# Patient Record
Sex: FEMALE | Race: WHITE | NOT HISPANIC OR LATINO | Employment: OTHER | URBAN - NONMETROPOLITAN AREA
[De-identification: names, ages, dates, MRNs, and addresses within clinical notes are randomized per-mention and may not be internally consistent; named-entity substitution may affect disease eponyms.]

---

## 2017-07-11 ENCOUNTER — HOSPITAL ENCOUNTER (EMERGENCY)
Facility: HOSPITAL | Age: 77
Discharge: HOME | End: 2017-07-11
Admitting: EMERGENCY MEDICINE

## 2017-07-11 VITALS
DIASTOLIC BLOOD PRESSURE: 67 MMHG | WEIGHT: 210 LBS | RESPIRATION RATE: 18 BRPM | HEIGHT: 62 IN | BODY MASS INDEX: 38.64 KG/M2 | OXYGEN SATURATION: 95 % | SYSTOLIC BLOOD PRESSURE: 145 MMHG | HEART RATE: 69 BPM | TEMPERATURE: 99 F

## 2017-07-11 DIAGNOSIS — N30.00 ACUTE CYSTITIS WITHOUT HEMATURIA: Primary | ICD-10-CM

## 2017-07-11 LAB
ALBUMIN SERPL-MCNC: 3.9 G/DL (ref 3.4–4.8)
ALBUMIN/GLOB SERPL: 1.1 G/DL (ref 1.1–1.8)
ALP SERPL-CCNC: 172 U/L (ref 38–126)
ALT SERPL W P-5'-P-CCNC: 31 U/L (ref 9–52)
ANION GAP SERPL CALCULATED.3IONS-SCNC: 13 MMOL/L (ref 5–15)
AST SERPL-CCNC: 47 U/L (ref 14–36)
BACTERIA UR QL AUTO: ABNORMAL /HPF
BASOPHILS # BLD AUTO: 0.02 10*3/MM3 (ref 0–0.2)
BASOPHILS NFR BLD AUTO: 0.2 % (ref 0–2)
BILIRUB SERPL-MCNC: 0.4 MG/DL (ref 0.2–1.3)
BILIRUB UR QL STRIP: ABNORMAL
BUN BLD-MCNC: 18 MG/DL (ref 7–21)
BUN/CREAT SERPL: 15.5 (ref 7–25)
CALCIUM SPEC-SCNC: 8.8 MG/DL (ref 8.4–10.2)
CHLORIDE SERPL-SCNC: 99 MMOL/L (ref 95–110)
CK SERPL-CCNC: 41 U/L (ref 30–135)
CLARITY UR: ABNORMAL
CO2 SERPL-SCNC: 27 MMOL/L (ref 22–31)
COLOR UR: YELLOW
CREAT BLD-MCNC: 1.16 MG/DL (ref 0.5–1)
DEPRECATED RDW RBC AUTO: 43.5 FL (ref 36.4–46.3)
EOSINOPHIL # BLD AUTO: 0.08 10*3/MM3 (ref 0–0.7)
EOSINOPHIL NFR BLD AUTO: 0.9 % (ref 0–7)
ERYTHROCYTE [DISTWIDTH] IN BLOOD BY AUTOMATED COUNT: 13.2 % (ref 11.5–14.5)
GFR SERPL CREATININE-BSD FRML MDRD: 45 ML/MIN/1.73 (ref 39–90)
GLOBULIN UR ELPH-MCNC: 3.7 GM/DL (ref 2.3–3.5)
GLUCOSE BLD-MCNC: 255 MG/DL (ref 60–100)
GLUCOSE UR STRIP-MCNC: NEGATIVE MG/DL
HCT VFR BLD AUTO: 38 % (ref 35–45)
HGB BLD-MCNC: 13 G/DL (ref 12–15.5)
HGB UR QL STRIP.AUTO: ABNORMAL
HOLD SPECIMEN: NORMAL
HOLD SPECIMEN: NORMAL
HYALINE CASTS UR QL AUTO: ABNORMAL /LPF
IMM GRANULOCYTES # BLD: 0.02 10*3/MM3 (ref 0–0.02)
IMM GRANULOCYTES NFR BLD: 0.2 % (ref 0–0.5)
KETONES UR QL STRIP: ABNORMAL
LEUKOCYTE ESTERASE UR QL STRIP.AUTO: ABNORMAL
LYMPHOCYTES # BLD AUTO: 1.92 10*3/MM3 (ref 0.6–4.2)
LYMPHOCYTES NFR BLD AUTO: 21.8 % (ref 10–50)
MCH RBC QN AUTO: 31 PG (ref 26.5–34)
MCHC RBC AUTO-ENTMCNC: 34.2 G/DL (ref 31.4–36)
MCV RBC AUTO: 90.5 FL (ref 80–98)
MONOCYTES # BLD AUTO: 0.56 10*3/MM3 (ref 0–0.9)
MONOCYTES NFR BLD AUTO: 6.4 % (ref 0–12)
NEUTROPHILS # BLD AUTO: 6.19 10*3/MM3 (ref 2–8.6)
NEUTROPHILS NFR BLD AUTO: 70.5 % (ref 37–80)
NITRITE UR QL STRIP: NEGATIVE
PH UR STRIP.AUTO: 5.5 [PH] (ref 5–9)
PLATELET # BLD AUTO: 311 10*3/MM3 (ref 150–450)
PMV BLD AUTO: 10.4 FL (ref 8–12)
POTASSIUM BLD-SCNC: 3.7 MMOL/L (ref 3.5–5.1)
PROT SERPL-MCNC: 7.6 G/DL (ref 6.3–8.6)
PROT UR QL STRIP: ABNORMAL
RBC # BLD AUTO: 4.2 10*6/MM3 (ref 3.77–5.16)
RBC # UR: ABNORMAL /HPF
REF LAB TEST METHOD: ABNORMAL
SODIUM BLD-SCNC: 139 MMOL/L (ref 137–145)
SP GR UR STRIP: 1.02 (ref 1–1.03)
SQUAMOUS #/AREA URNS HPF: ABNORMAL /HPF
TROPONIN I SERPL-MCNC: <0.012 NG/ML
UROBILINOGEN UR QL STRIP: ABNORMAL
WBC NRBC COR # BLD: 8.79 10*3/MM3 (ref 3.2–9.8)
WBC UR QL AUTO: ABNORMAL /HPF
WHOLE BLOOD HOLD SPECIMEN: NORMAL
WHOLE BLOOD HOLD SPECIMEN: NORMAL

## 2017-07-11 PROCEDURE — 81001 URINALYSIS AUTO W/SCOPE: CPT

## 2017-07-11 PROCEDURE — 82550 ASSAY OF CK (CPK): CPT

## 2017-07-11 PROCEDURE — 96365 THER/PROPH/DIAG IV INF INIT: CPT

## 2017-07-11 PROCEDURE — 93010 ELECTROCARDIOGRAM REPORT: CPT | Performed by: INTERNAL MEDICINE

## 2017-07-11 PROCEDURE — 87077 CULTURE AEROBIC IDENTIFY: CPT

## 2017-07-11 PROCEDURE — 85025 COMPLETE CBC W/AUTO DIFF WBC: CPT

## 2017-07-11 PROCEDURE — 96366 THER/PROPH/DIAG IV INF ADDON: CPT

## 2017-07-11 PROCEDURE — 99284 EMERGENCY DEPT VISIT MOD MDM: CPT

## 2017-07-11 PROCEDURE — 84484 ASSAY OF TROPONIN QUANT: CPT

## 2017-07-11 PROCEDURE — 80053 COMPREHEN METABOLIC PANEL: CPT

## 2017-07-11 PROCEDURE — 87186 SC STD MICRODIL/AGAR DIL: CPT

## 2017-07-11 PROCEDURE — 25010000002 CEFTRIAXONE: Performed by: PHYSICIAN ASSISTANT

## 2017-07-11 PROCEDURE — 93005 ELECTROCARDIOGRAM TRACING: CPT

## 2017-07-11 PROCEDURE — 87086 URINE CULTURE/COLONY COUNT: CPT

## 2017-07-11 RX ORDER — CLONIDINE HYDROCHLORIDE 0.1 MG/1
0.1 TABLET ORAL ONCE
Status: COMPLETED | OUTPATIENT
Start: 2017-07-11 | End: 2017-07-11

## 2017-07-11 RX ORDER — HYDRALAZINE HYDROCHLORIDE 25 MG/1
25 TABLET, FILM COATED ORAL 2 TIMES DAILY
COMMUNITY
Start: 2017-05-16 | End: 2018-05-17

## 2017-07-11 RX ORDER — PRAVASTATIN SODIUM 20 MG
20 TABLET ORAL 3 TIMES WEEKLY
COMMUNITY
Start: 2017-02-15 | End: 2022-09-20 | Stop reason: HOSPADM

## 2017-07-11 RX ORDER — CLONIDINE HYDROCHLORIDE 0.1 MG/1
0.1 TABLET ORAL ONCE
Status: DISCONTINUED | OUTPATIENT
Start: 2017-07-11 | End: 2017-07-11

## 2017-07-11 RX ORDER — IMATINIB MESYLATE 400 MG/1
400 TABLET, FILM COATED ORAL DAILY
COMMUNITY
End: 2019-07-08

## 2017-07-11 RX ORDER — NITROFURANTOIN 25; 75 MG/1; MG/1
100 CAPSULE ORAL 2 TIMES DAILY
Qty: 14 CAPSULE | Refills: 0 | Status: SHIPPED | OUTPATIENT
Start: 2017-07-11 | End: 2017-07-18

## 2017-07-11 RX ORDER — CLOPIDOGREL BISULFATE 75 MG/1
75 TABLET ORAL DAILY
COMMUNITY
End: 2022-09-20 | Stop reason: HOSPADM

## 2017-07-11 RX ORDER — CARBAMAZEPINE 200 MG/1
200 TABLET ORAL 2 TIMES DAILY
COMMUNITY
Start: 2016-07-25

## 2017-07-11 RX ORDER — LISINOPRIL 40 MG/1
40 TABLET ORAL DAILY
COMMUNITY
Start: 2017-02-15

## 2017-07-11 RX ORDER — METOPROLOL SUCCINATE 100 MG/1
100 TABLET, EXTENDED RELEASE ORAL NIGHTLY
COMMUNITY
Start: 2017-02-15 | End: 2022-09-20 | Stop reason: HOSPADM

## 2017-07-11 RX ORDER — AMLODIPINE BESYLATE 10 MG/1
10 TABLET ORAL NIGHTLY
COMMUNITY
Start: 2017-02-15

## 2017-07-11 RX ORDER — SODIUM CHLORIDE 9 MG/ML
1000 INJECTION, SOLUTION INTRAVENOUS ONCE
Status: COMPLETED | OUTPATIENT
Start: 2017-07-11 | End: 2017-07-11

## 2017-07-11 RX ADMIN — CLONIDINE HYDROCHLORIDE 0.1 MG: 0.1 TABLET ORAL at 16:43

## 2017-07-11 RX ADMIN — SODIUM CHLORIDE 1000 ML: 9 INJECTION, SOLUTION INTRAVENOUS at 16:42

## 2017-07-11 RX ADMIN — CEFTRIAXONE 1 G: 1 INJECTION, POWDER, FOR SOLUTION INTRAMUSCULAR; INTRAVENOUS at 16:42

## 2017-07-11 NOTE — ED PROVIDER NOTES
Subjective   Patient is a 76 y.o. female presenting with back pain.   History provided by:  Patient   used: No    Back Pain   Pain location: CVA tenderness. Bilateral.  Quality:  Aching and cramping  Radiates to:  Does not radiate  Pain severity:  Moderate  Pain is:  Same all the time  Onset quality:  Gradual  Duration:  3 days  Timing:  Constant  Progression:  Worsening  Chronicity:  New  Context: not emotional stress, not falling, not jumping from heights, not lifting heavy objects, not MCA, not MVA, not occupational injury, not pedestrian accident, not physical stress, not recent illness, not recent injury and not twisting    Relieved by:  Nothing  Worsened by:  Nothing  Ineffective treatments:  None tried  Associated symptoms: dysuria, fever and pelvic pain    Associated symptoms: no abdominal pain, no abdominal swelling, no bladder incontinence, no bowel incontinence, no chest pain, no headaches, no leg pain, no numbness, no paresthesias, no perianal numbness, no tingling, no weakness and no weight loss    Risk factors: hx of cancer    Risk factors: no hx of osteoporosis, no lack of exercise, no menopause, not obese, not pregnant, no recent surgery, no steroid use and no vascular disease        Review of Systems   Constitutional: Positive for fever. Negative for activity change, appetite change, chills, diaphoresis, fatigue, unexpected weight change and weight loss.   HENT: Negative.    Eyes: Negative.    Respiratory: Negative.    Cardiovascular: Negative.  Negative for chest pain.   Gastrointestinal: Negative.  Negative for abdominal pain and bowel incontinence.   Endocrine: Negative.    Genitourinary: Positive for dysuria, flank pain, pelvic pain and urgency. Negative for bladder incontinence, decreased urine volume, difficulty urinating, dyspareunia, enuresis, frequency, genital sores, hematuria, menstrual problem, vaginal bleeding, vaginal discharge and vaginal pain.   Musculoskeletal:  Positive for back pain. Negative for arthralgias, gait problem, joint swelling, myalgias, neck pain and neck stiffness.   Skin: Negative.    Allergic/Immunologic: Negative.    Neurological: Negative.  Negative for tingling, weakness, numbness, headaches and paresthesias.   Psychiatric/Behavioral: Negative.        Past Medical History:   Diagnosis Date   • Cancer 2009    leukemia   • Coronary artery disease    • Diabetes mellitus    • Hypertension    • Injury of back    • Myocardial infarction    • Stroke        Allergies   Allergen Reactions   • Atorvastatin      Memory   • Levofloxacin    • Meclizine    • Morphine        Past Surgical History:   Procedure Laterality Date   • CATARACT EXTRACTION     • TRIGEMINAL NERVE DECOMPRESSION     • TUBAL ABDOMINAL LIGATION     • UVULOPALATOPHARYNGOPLASTY         History reviewed. No pertinent family history.    Social History     Social History   • Marital status:      Spouse name: N/A   • Number of children: N/A   • Years of education: N/A     Social History Main Topics   • Smoking status: Never Smoker   • Smokeless tobacco: None   • Alcohol use No   • Drug use: No   • Sexual activity: No     Other Topics Concern   • None     Social History Narrative   • None           Objective   Physical Exam   Constitutional: She is oriented to person, place, and time. She appears well-developed and well-nourished. No distress.   HENT:   Head: Normocephalic and atraumatic.   Eyes: Conjunctivae and EOM are normal. Pupils are equal, round, and reactive to light. Right eye exhibits no discharge. Left eye exhibits no discharge.   Neck: Normal range of motion. Neck supple. No JVD present. No thyromegaly present.   Cardiovascular: Normal rate, regular rhythm, normal heart sounds and intact distal pulses.  Exam reveals no gallop and no friction rub.    No murmur heard.  Pulmonary/Chest: Effort normal and breath sounds normal. No stridor. No respiratory distress. She has no wheezes. She  exhibits no tenderness.   Abdominal: Soft. Bowel sounds are normal. She exhibits no distension and no mass. There is no tenderness. There is no rebound and no guarding. No hernia.   Musculoskeletal: Normal range of motion. She exhibits no edema, tenderness or deformity.   Lymphadenopathy:     She has no cervical adenopathy.   Neurological: She is alert and oriented to person, place, and time. She has normal reflexes. She displays normal reflexes. No cranial nerve deficit. She exhibits normal muscle tone. Coordination normal.   Skin: Skin is warm and dry. No rash noted. She is not diaphoretic. No erythema. No pallor.   Psychiatric: She has a normal mood and affect. Her behavior is normal. Judgment and thought content normal.   Nursing note and vitals reviewed.      Procedures    Labs Reviewed   URINALYSIS W/ CULTURE IF INDICATED - Abnormal; Notable for the following:        Result Value    Appearance, UA Cloudy (*)     Ketones, UA Trace (*)     Bilirubin, UA Small (1+) (*)     Blood, UA Trace (*)     Protein,  mg/dL (2+) (*)     Leuk Esterase, UA Large (3+) (*)     All other components within normal limits   URINALYSIS, MICROSCOPIC ONLY - Abnormal; Notable for the following:     RBC, UA 0-2 (*)     WBC, UA Too Numerous to Count (*)     Bacteria, UA 4+ (*)     Squamous Epithelial Cells, UA 6-12 (*)     All other components within normal limits   COMPREHENSIVE METABOLIC PANEL - Abnormal; Notable for the following:     Glucose 255 (*)     Creatinine 1.16 (*)     AST (SGOT) 47 (*)     Alkaline Phosphatase 172 (*)     Globulin 3.7 (*)     All other components within normal limits    Narrative:     The MDRD GFR formula is only valid for adults with stable renal function between ages 18 and 70.   CK - Normal   TROPONIN (IN-HOUSE) - Normal   CBC WITH AUTO DIFFERENTIAL - Normal   URINE CULTURE   RAINBOW DRAW    Narrative:     The following orders were created for panel order [place] Draw.  Procedure                                Abnormality         Status                     ---------                               -----------         ------                     Light Blue Top[[id number]]                                    Final result               Green Top (Gel)[[id number]]                                  Final result               Lavender Top[[id number]]                                     Final result               Gold Top - SST[[id number]]                                   Final result                 Please view results for these tests on the individual orders.   MYOGLOBIN, SERUM   LIGHT BLUE TOP   GREEN TOP   LAVENDER TOP   GOLD TOP - SST   CBC AND DIFFERENTIAL    Narrative:     The following orders were created for panel order CBC & Differential.  Procedure                               Abnormality         Status                     ---------                               -----------         ------                     CBC Auto Differential[[id number]]        Normal              Final result                 Please view results for these tests on the individual orders.       ED Course  ED Course    Patient is doing better after fluids. States that her back is still hurting some. She has admitted to having urgency and burning for few days. Denies nausea, vomiting, or diarrhea.   Patient states that she has had her blood pressure medication today. Clonidine 0.1mg was given with some change. Patient given a second Clonidine 0.1mg.   Patient being sent home with antibiotics for UTI.   Patient understands and is agreeable with treatment, wants to go home.             MDM  Number of Diagnoses or Management Options     Amount and/or Complexity of Data Reviewed  Clinical lab tests: reviewed  Tests in the medicine section of CPT®: reviewed    Patient Progress  Patient progress: improved      Final diagnoses:   Acute cystitis without hematuria            RICKI Godoy  07/11/17 1720       RICKI Godoy  07/11/17 1728       Navya FARIAS  RICKI Eaton  07/11/17 2008       RICKI Godoy  07/11/17 2009

## 2017-07-13 LAB — BACTERIA SPEC AEROBE CULT: ABNORMAL

## 2019-03-20 ENCOUNTER — HOSPITAL ENCOUNTER (EMERGENCY)
Facility: HOSPITAL | Age: 79
Discharge: HOME | End: 2019-03-20
Attending: EMERGENCY MEDICINE | Admitting: EMERGENCY MEDICINE

## 2019-03-20 ENCOUNTER — APPOINTMENT (OUTPATIENT)
Dept: GENERAL RADIOLOGY | Facility: HOSPITAL | Age: 79
End: 2019-03-20

## 2019-03-20 VITALS
TEMPERATURE: 98.4 F | HEIGHT: 62 IN | OXYGEN SATURATION: 94 % | WEIGHT: 186.7 LBS | SYSTOLIC BLOOD PRESSURE: 177 MMHG | DIASTOLIC BLOOD PRESSURE: 72 MMHG | RESPIRATION RATE: 20 BRPM | HEART RATE: 87 BPM | BODY MASS INDEX: 34.36 KG/M2

## 2019-03-20 DIAGNOSIS — N30.00 ACUTE CYSTITIS WITHOUT HEMATURIA: Primary | ICD-10-CM
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
## 2019-03-20 LAB
ALBUMIN SERPL-MCNC: 3.7 G/DL (ref 3.4–4.8)
ALBUMIN/GLOB SERPL: 1 G/DL (ref 1.1–1.8)
ALP SERPL-CCNC: 157 U/L (ref 38–126)
ALT SERPL W P-5'-P-CCNC: 16 U/L (ref 9–52)
ANION GAP SERPL CALCULATED.3IONS-SCNC: 11 MMOL/L (ref 5–15)
AST SERPL-CCNC: 23 U/L (ref 14–36)
BACTERIA UR QL AUTO: ABNORMAL /HPF
BASOPHILS # BLD AUTO: 0.06 10*3/MM3 (ref 0–0.2)
BASOPHILS NFR BLD AUTO: 0.4 % (ref 0–1.5)
BILIRUB SERPL-MCNC: 0.4 MG/DL (ref 0.2–1.3)
BILIRUB UR QL STRIP: NEGATIVE
BUN BLD-MCNC: 23 MG/DL (ref 7–21)
BUN/CREAT SERPL: 23.7 (ref 7–25)
CALCIUM SPEC-SCNC: 9 MG/DL (ref 8.4–10.2)
CARBAMAZEPINE SERPL-MCNC: 9.3 MCG/ML (ref 4–12)
CHLORIDE SERPL-SCNC: 98 MMOL/L (ref 95–110)
CLARITY UR: ABNORMAL
CO2 SERPL-SCNC: 24 MMOL/L (ref 22–31)
COLOR UR: YELLOW
CREAT BLD-MCNC: 0.97 MG/DL (ref 0.5–1)
DEPRECATED RDW RBC AUTO: 41.1 FL (ref 37–54)
EOSINOPHIL # BLD AUTO: 0.01 10*3/MM3 (ref 0–0.4)
EOSINOPHIL NFR BLD AUTO: 0.1 % (ref 0.3–6.2)
ERYTHROCYTE [DISTWIDTH] IN BLOOD BY AUTOMATED COUNT: 12.9 % (ref 12.3–15.4)
FLUAV AG NPH QL: NEGATIVE
FLUBV AG NPH QL IA: NEGATIVE
GFR SERPL CREATININE-BSD FRML MDRD: 56 ML/MIN/1.73 (ref 39–90)
GLOBULIN UR ELPH-MCNC: 3.6 GM/DL (ref 2.3–3.5)
GLUCOSE BLD-MCNC: 132 MG/DL (ref 60–100)
GLUCOSE UR STRIP-MCNC: ABNORMAL MG/DL
HCT VFR BLD AUTO: 37.8 % (ref 34–46.6)
HGB BLD-MCNC: 12.6 G/DL (ref 12–15.9)
HGB UR QL STRIP.AUTO: ABNORMAL
HOLD SPECIMEN: NORMAL
HYALINE CASTS UR QL AUTO: ABNORMAL /LPF
IMM GRANULOCYTES # BLD AUTO: 0.08 10*3/MM3 (ref 0–0.05)
IMM GRANULOCYTES NFR BLD AUTO: 0.5 % (ref 0–0.5)
KETONES UR QL STRIP: ABNORMAL
LEUKOCYTE ESTERASE UR QL STRIP.AUTO: ABNORMAL
LYMPHOCYTES # BLD AUTO: 1.39 10*3/MM3 (ref 0.7–3.1)
LYMPHOCYTES NFR BLD AUTO: 9.4 % (ref 19.6–45.3)
MCH RBC QN AUTO: 29 PG (ref 26.6–33)
MCHC RBC AUTO-ENTMCNC: 33.3 G/DL (ref 31.5–35.7)
MCV RBC AUTO: 86.9 FL (ref 79–97)
MONOCYTES # BLD AUTO: 1.07 10*3/MM3 (ref 0.1–0.9)
MONOCYTES NFR BLD AUTO: 7.2 % (ref 5–12)
NEUTROPHILS # BLD AUTO: 12.23 10*3/MM3 (ref 1.4–7)
NEUTROPHILS NFR BLD AUTO: 82.4 % (ref 42.7–76)
NITRITE UR QL STRIP: NEGATIVE
NRBC BLD AUTO-RTO: 0 /100 WBC (ref 0–0)
PH UR STRIP.AUTO: 5.5 [PH] (ref 5–9)
PLATELET # BLD AUTO: 245 10*3/MM3 (ref 140–450)
PMV BLD AUTO: 8.8 FL (ref 6–12)
POTASSIUM BLD-SCNC: 3.6 MMOL/L (ref 3.5–5.1)
PROT SERPL-MCNC: 7.3 G/DL (ref 6.3–8.6)
PROT UR QL STRIP: ABNORMAL
RBC # BLD AUTO: 4.35 10*6/MM3 (ref 3.77–5.28)
RBC # UR: ABNORMAL /HPF
REF LAB TEST METHOD: ABNORMAL
SODIUM BLD-SCNC: 133 MMOL/L (ref 137–145)
SP GR UR STRIP: 1.02 (ref 1–1.03)
SQUAMOUS #/AREA URNS HPF: ABNORMAL /HPF
UROBILINOGEN UR QL STRIP: ABNORMAL
WBC NRBC COR # BLD: 14.84 10*3/MM3 (ref 3.4–10.8)
WBC UR QL AUTO: ABNORMAL /HPF
WHOLE BLOOD HOLD SPECIMEN: NORMAL

## 2019-03-20 PROCEDURE — 99285 EMERGENCY DEPT VISIT HI MDM: CPT

## 2019-03-20 PROCEDURE — 87804 INFLUENZA ASSAY W/OPTIC: CPT | Performed by: EMERGENCY MEDICINE

## 2019-03-20 PROCEDURE — 36415 COLL VENOUS BLD VENIPUNCTURE: CPT | Performed by: EMERGENCY MEDICINE

## 2019-03-20 PROCEDURE — 81001 URINALYSIS AUTO W/SCOPE: CPT | Performed by: EMERGENCY MEDICINE

## 2019-03-20 PROCEDURE — 96361 HYDRATE IV INFUSION ADD-ON: CPT

## 2019-03-20 PROCEDURE — 73523 X-RAY EXAM HIPS BI 5/> VIEWS: CPT

## 2019-03-20 PROCEDURE — 96375 TX/PRO/DX INJ NEW DRUG ADDON: CPT

## 2019-03-20 PROCEDURE — 93005 ELECTROCARDIOGRAM TRACING: CPT | Performed by: EMERGENCY MEDICINE

## 2019-03-20 PROCEDURE — 80053 COMPREHEN METABOLIC PANEL: CPT | Performed by: EMERGENCY MEDICINE

## 2019-03-20 PROCEDURE — 73564 X-RAY EXAM KNEE 4 OR MORE: CPT

## 2019-03-20 PROCEDURE — 93010 ELECTROCARDIOGRAM REPORT: CPT | Performed by: INTERNAL MEDICINE

## 2019-03-20 PROCEDURE — 25010000002 FENTANYL CITRATE (PF) 100 MCG/2ML SOLUTION: Performed by: EMERGENCY MEDICINE

## 2019-03-20 PROCEDURE — 73630 X-RAY EXAM OF FOOT: CPT

## 2019-03-20 PROCEDURE — 96376 TX/PRO/DX INJ SAME DRUG ADON: CPT

## 2019-03-20 PROCEDURE — 85025 COMPLETE CBC W/AUTO DIFF WBC: CPT | Performed by: EMERGENCY MEDICINE

## 2019-03-20 PROCEDURE — 80156 ASSAY CARBAMAZEPINE TOTAL: CPT | Performed by: EMERGENCY MEDICINE

## 2019-03-20 PROCEDURE — 96374 THER/PROPH/DIAG INJ IV PUSH: CPT

## 2019-03-20 PROCEDURE — 25010000002 ONDANSETRON PER 1 MG: Performed by: EMERGENCY MEDICINE

## 2019-03-20 RX ORDER — CEPHALEXIN 500 MG/1
500 CAPSULE ORAL 2 TIMES DAILY
Qty: 10 CAPSULE | Refills: 0 | Status: SHIPPED | OUTPATIENT
Start: 2019-03-20 | End: 2019-03-25

## 2019-03-20 RX ORDER — ONDANSETRON 2 MG/ML
4 INJECTION INTRAMUSCULAR; INTRAVENOUS ONCE
Status: COMPLETED | OUTPATIENT
Start: 2019-03-20 | End: 2019-03-20

## 2019-03-20 RX ORDER — FENTANYL CITRATE 50 UG/ML
25 INJECTION, SOLUTION INTRAMUSCULAR; INTRAVENOUS ONCE
Status: COMPLETED | OUTPATIENT
Start: 2019-03-20 | End: 2019-03-20

## 2019-03-20 RX ORDER — SODIUM CHLORIDE 9 MG/ML
INJECTION, SOLUTION INTRAVENOUS
Status: DISCONTINUED
Start: 2019-03-20 | End: 2019-03-21 | Stop reason: HOSPADM

## 2019-03-20 RX ORDER — SODIUM CHLORIDE 0.9 % (FLUSH) 0.9 %
10 SYRINGE (ML) INJECTION AS NEEDED
Status: DISCONTINUED | OUTPATIENT
Start: 2019-03-20 | End: 2019-03-21 | Stop reason: HOSPADM

## 2019-03-20 RX ORDER — CEPHALEXIN 500 MG/1
500 CAPSULE ORAL ONCE
Status: COMPLETED | OUTPATIENT
Start: 2019-03-20 | End: 2019-03-20

## 2019-03-20 RX ORDER — CLONIDINE HYDROCHLORIDE 0.1 MG/1
0.1 TABLET ORAL ONCE
Status: COMPLETED | OUTPATIENT
Start: 2019-03-20 | End: 2019-03-20

## 2019-03-20 RX ADMIN — ONDANSETRON 4 MG: 2 INJECTION INTRAMUSCULAR; INTRAVENOUS at 18:21

## 2019-03-20 RX ADMIN — CEPHALEXIN 500 MG: 500 CAPSULE ORAL at 21:33

## 2019-03-20 RX ADMIN — FENTANYL CITRATE 25 MCG: 50 INJECTION, SOLUTION INTRAMUSCULAR; INTRAVENOUS at 19:45

## 2019-03-20 RX ADMIN — FENTANYL CITRATE 25 MCG: 50 INJECTION, SOLUTION INTRAMUSCULAR; INTRAVENOUS at 18:20

## 2019-03-20 RX ADMIN — CLONIDINE HYDROCHLORIDE 0.1 MG: 0.1 TABLET ORAL at 19:45

## 2019-03-20 RX ADMIN — SODIUM CHLORIDE 500 ML: 9 INJECTION, SOLUTION INTRAVENOUS at 18:20

## 2019-03-20 NOTE — ED PROVIDER NOTES
Subjective   78 year old female presents to the ED after an accidental slip and fall. Denies hitting her head or LOC. Denies focal pain. States she hurts all over. Rates pain 9/10. States her worse pain is her tongue. States a sore there that she is seeing a specialist for and on Tegratol for pain. Been on going off and on for years. States generally feeling unwell x 1 week and fell today around 1 pm. Accompanied by a family member but brought in by EMS. No chest pain or shortness of breath.     Family history, surgical history, social history, current medications and allergies are reviewed with the patient and triage documentation and vitals are reviewed.          History provided by:  Patient and relative   used: No        Review of Systems   Constitutional: Negative for chills and fever.   HENT: Negative for ear discharge, sinus pressure, sinus pain and sore throat.         Tongue pain   Eyes: Negative for photophobia and visual disturbance.   Respiratory: Negative for cough, shortness of breath and wheezing.    Cardiovascular: Negative for chest pain, palpitations and leg swelling.   Gastrointestinal: Negative for abdominal pain, constipation, nausea and vomiting.   Endocrine: Negative.    Genitourinary: Negative for dysuria, frequency and urgency.   Musculoskeletal: Negative for back pain and neck pain.   Skin: Negative for color change, rash and wound.   Allergic/Immunologic: Negative.    Neurological: Positive for weakness. Negative for dizziness, syncope, speech difficulty, light-headedness, numbness and headaches.   Hematological: Negative.    Psychiatric/Behavioral: Negative.        Past Medical History:   Diagnosis Date   • Cancer (CMS/[facility]) 2009    leukemia   • Coronary artery disease    • Diabetes mellitus (CMS/[facility])    • Hypertension    • Injury of back    • Myocardial infarction    • Stroke (CMS/[facility])        Allergies   Allergen Reactions   • Atorvastatin      Memory   • Levofloxacin     • Meclizine    • Morphine        Past Surgical History:   Procedure Laterality Date   • CATARACT EXTRACTION     • TRIGEMINAL NERVE DECOMPRESSION     • TUBAL ABDOMINAL LIGATION     • UVULOPALATOPHARYNGOPLASTY         No family history on file.    Social History     Socioeconomic History   • Marital status:      Spouse name: Not on file   • Number of children: Not on file   • Years of education: Not on file   • Highest education level: Not on file   Tobacco Use   • Smoking status: Never Smoker   Substance and Sexual Activity   • Alcohol use: No   • Drug use: No   • Sexual activity: No           Objective   Physical Exam   Constitutional: She is oriented to person, place, and time. She appears well-developed and well-nourished.   HENT:   Head: Normocephalic and atraumatic.   Mouth/Throat: Oropharynx is clear and moist.   Eyes: EOM are normal. Pupils are equal, round, and reactive to light.   Neck: Normal range of motion.   Cardiovascular: Normal rate, regular rhythm and intact distal pulses.   No murmur heard.  Pulmonary/Chest: Effort normal and breath sounds normal. She has no wheezes.   Abdominal: Soft. Bowel sounds are normal. There is no tenderness.   Musculoskeletal:        Right hip: She exhibits normal range of motion, normal strength, no tenderness, no bony tenderness, no deformity and no laceration.        Left hip: She exhibits normal range of motion, normal strength, no tenderness, no bony tenderness, no crepitus, no deformity and no laceration.        Right knee: She exhibits normal range of motion, no swelling, no effusion, no ecchymosis, no deformity and no bony tenderness. Tenderness found.        Left knee: She exhibits normal range of motion, no swelling, no effusion, no ecchymosis, no deformity and no bony tenderness. Tenderness found.        Cervical back: She exhibits normal range of motion, no tenderness, no bony tenderness, no deformity, no pain and no spasm.   Neurological: She is alert  and oriented to person, place, and time.   Skin: Skin is warm and dry. Capillary refill takes less than 2 seconds.   Nursing note and vitals reviewed.      Procedures  none         ED Course      Labs Reviewed   COMPREHENSIVE METABOLIC PANEL - Abnormal; Notable for the following components:       Result Value    Glucose 132 (*)     BUN 23 (*)     Sodium 133 (*)     Alkaline Phosphatase 157 (*)     Globulin 3.6 (*)     A/G Ratio 1.0 (*)     All other components within normal limits    Narrative:     The MDRD GFR formula is only valid for adults with stable renal function between ages 18 and 70.   URINALYSIS W/ MICROSCOPIC IF INDICATED (NO CULTURE) - Abnormal; Notable for the following components:    Appearance, UA Cloudy (*)     Glucose,  mg/dL (1+) (*)     Ketones, UA 80 mg/dL (3+) (*)     Blood, UA Small (1+) (*)     Protein, UA 30 mg/dL (1+) (*)     Leuk Esterase, UA Moderate (2+) (*)     All other components within normal limits   CBC WITH AUTO DIFFERENTIAL - Abnormal; Notable for the following components:    WBC 14.84 (*)     Neutrophil % 82.4 (*)     Lymphocyte % 9.4 (*)     Eosinophil % 0.1 (*)     Neutrophils, Absolute 12.23 (*)     Monocytes, Absolute 1.07 (*)     Immature Grans, Absolute 0.08 (*)     All other components within normal limits   URINALYSIS, MICROSCOPIC ONLY - Abnormal; Notable for the following components:    RBC, UA 3-5 (*)     WBC, UA Too Numerous to Count (*)     Bacteria, UA 1+ (*)     All other components within normal limits   INFLUENZA ANTIGEN, RAPID - Normal   CARBAMAZEPINE LEVEL, TOTAL - Normal   CBC AND DIFFERENTIAL    Narrative:     The following orders were created for panel order CBC & Differential.  Procedure                               Abnormality         Status                     ---------                               -----------         ------                     CBC Auto Differential[[id number]]        Abnormal            Final result                 Please view  results for these tests on the individual orders.   EXTRA TUBES    Narrative:     The following orders were created for panel order Extra Tubes.  Procedure                               Abnormality         Status                     ---------                               -----------         ------                     Light Blue Top[[id number]]                                   Final result               Green Top (Gel)[[id number]]                                  Final result                 Please view results for these tests on the individual orders.   LIGHT BLUE TOP   GREEN TOP     Xr Knee 4+ View Bilateral    Result Date: 3/20/2019  Narrative: Bilateral four view knees HISTORY: Pain after falling AP, lateral, tunnel and oblique views of each knee obtained. COMPARISON: None No fracture or dislocation. Degenerative changes bilaterally with small osteophytes and narrowing of each medial joint space. Bilateral chondrocalcinosis. Very small bilateral suprapatellar effusions. No other osseous or articular abnormality.     Impression: CONCLUSION: No fracture or dislocation. Degenerative changes bilaterally with small osteophytes and narrowing of each medial joint space. Bilateral chondrocalcinosis. Very small bilateral suprapatellar effusions. [id number] Electronically signed by:  Osbaldo Quiles MD  3/20/2019 5:25 PM CDT Workstation: [id number]    Xr Foot 3+ View Bilateral    Result Date: 3/20/2019  Narrative: Three view bilateral feet HISTORY: Pain after falling AP, lateral and oblique views of each foot obtained. COMPARISON: None No fracture or dislocation. Bilateral hallux valgus deformity and bunion formation, more pronounced on the right. Bilateral calcaneal spurs. No other osseous or articular abnormality.     Impression: CONCLUSION: No fracture or dislocation. Bilateral hallux valgus deformity and bunion formation, more pronounced on the right. Bilateral calcaneal spurs. [id number] Electronically signed by:  Osbaldo Quiles MD   "3/20/2019 5:22 PM CDT Workstation: [id number]    Xr Hips Bilateral With Or Without Pelvis 5 View    Result Date: 3/20/2019  Narrative: Two view bilateral hips with single view pelvis HISTORY: Pain after falling AP and frog-leg lateral views of each hip and AP film of the pelvis obtained. COMPARISON: None No fracture or dislocation. Minimal degenerative change iliac crests and inferior pubic rami. Multilevel degenerative disc disease lumbar spine. No other osseous or articular abnormality.     Impression: CONCLUSION: No fracture or dislocation [id number] Electronically signed by:  Osbaldo Quiles MD  3/20/2019 5:13 PM CDT Workstation: [id number]    EKG March 20, 2019 at 1723 reveals normal sinus rhythm rate of 80 bpm.  There is no ST elevation or depression, no T wave inversion.  T wave flattening in lead III and aVL with no evidence of acute ischemia.  Unchanged from July 11, 2017.    MDM  Number of Diagnoses or Management Options  Accidental fall, initial encounter:   Acute cystitis without hematuria:      Amount and/or Complexity of Data Reviewed  Clinical lab tests: reviewed  Tests in the radiology section of CPT®: reviewed  Tests in the medicine section of CPT®: reviewed    Patient Progress  Patient progress: stable    Imaging with no acute bony abnormality. UA reveals UTI. Remaninig workup unremarkable. No description of syncope. Started on antibiotic. Patient request I find out what is causing her tongue pain. Advised she is being evaluated by a specialist and that is the course she should take. She is already on an appropriate medication that I would prescribe. Then requests admission stating she is too weak to go home. Relative states she cannot get her home in her \"2 door dimitry\". Advised patient and family that EMS would not be covered as she ambulates and lives alone and that there is no criteria for admission. Patient helped out to car by staff and is agreeable to discharge. Advised to return with new or worsening " symptoms.    Final diagnoses:   Acute cystitis without hematuria   Accidental fall, initial encounter            Cooper Kamara DO  03/23/19 1953

## 2019-03-21 NOTE — DISCHARGE INSTRUCTIONS
Please return with new or worsening symptoms.  Get antibiotic filled take as directed for the complete course.  Follow-up with planned specialist regarding your tongue pain.

## 2019-04-13 ENCOUNTER — LAB REQUISITION (OUTPATIENT)
Dept: LAB | Facility: HOSPITAL | Age: 79
End: 2019-04-13

## 2019-04-13 DIAGNOSIS — J11.2 INFLUENZA DUE TO UNIDENTIFIED INFLUENZA VIRUS WITH GASTROINTESTINAL MANIFESTATION: ICD-10-CM

## 2019-04-13 LAB
FLUAV AG NPH QL: NEGATIVE
FLUBV AG NPH QL IA: NEGATIVE

## 2019-04-13 PROCEDURE — 87804 INFLUENZA ASSAY W/OPTIC: CPT | Performed by: GENERAL PRACTICE

## 2019-07-05 ENCOUNTER — CLINICAL SUPPORT (OUTPATIENT)
Dept: AUDIOLOGY | Facility: CLINIC | Age: 79
End: 2019-07-05

## 2019-07-05 DIAGNOSIS — H69.82 EUSTACHIAN TUBE DYSFUNCTION, LEFT: ICD-10-CM

## 2019-07-05 DIAGNOSIS — H91.8X3 OTHER SPECIFIED HEARING LOSS, BILATERAL: Primary | ICD-10-CM

## 2019-07-05 NOTE — PROGRESS NOTES
STANDARD AUDIOMETRIC EVALUATION      Name:  Magy Barton  :  1940  Age:  78 y.o.  Date of Evaluation:  2019      HISTORY    Reason for visit:  Magy Barton is seen today for a hearing test at the request of Dr. Robel Isaacs.  Patient reports both ears were stopped up, and she was having trouble hearing.  She states this seemed to happen overnight, and her TV is louder.  She states she has been on antibiotics, but it hasn't helped.  She states she did not have problems hearing before.       EVALUATION    See Audiogram    RESULTS        Otoscopy and Tympanometry 226 Hz :  Right Ear:  Otoscopy:  Clear ear canal          Tympanometry:  Middle ear function within normal limits    Left Ear:   Otoscopy:  Clear ear canal        Tympanometry:  Reduced pressure and compliance consistent with outer/middle ear involvement    Test technique:  Standard Audiometry     Pure Tone Audiometry:   Patient responded to pure tones at 25-55 dB for 250-8000 Hz in right ear, and at 35-70 dB for 250-8000 Hz in left ear.       Speech Audiometry:        Right Ear:  Speech Reception Threshold (SRT) was obtained at 30 dBHL                 Speech Discrimination scores were 84% in quiet when words were presented at 70 dBHL       Left Ear:  Speech Reception Threshold (SRT) was obtained at 45 dBHL                 Speech Discrimination scores were 96% in quiet when words were presented at 85 dBHL    Reliability:   good    IMPRESSIONS:  1.  Tympanometry results are consistent with Middle ear function within normal limits in right ear, and Reduced pressure and compliance consistent with outer/middle ear involvement in left ear.  2.  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       RECOMMENDATIONS:  Patient is seeing the Ear Nose and Throat physician in the near future.  It was a pleasure seeing Magy Alba  Mick in Audiology today.  We would be happy to do further testing or discuss these test as necessary.          This document has been electronically signed by Kelle Rogers MS CCC-A on July 5, 2019 3:31 PM       Kelle Rogers MS CCC-A  Licensed Audiologist

## 2019-07-08 ENCOUNTER — OFFICE VISIT (OUTPATIENT)
Dept: OTOLARYNGOLOGY | Facility: CLINIC | Age: 79
End: 2019-07-08

## 2019-07-08 VITALS
HEIGHT: 62 IN | WEIGHT: 181 LBS | TEMPERATURE: 98.4 F | BODY MASS INDEX: 33.31 KG/M2 | HEART RATE: 66 BPM | OXYGEN SATURATION: 98 %

## 2019-07-08 DIAGNOSIS — H65.22 CHRONIC SEROUS OTITIS MEDIA, LEFT EAR: ICD-10-CM

## 2019-07-08 DIAGNOSIS — H90.72 MIXED CONDUCTIVE AND SENSORINEURAL HEARING LOSS OF LEFT EAR WITH UNRESTRICTED HEARING OF RIGHT EAR: Primary | ICD-10-CM

## 2019-07-08 PROCEDURE — 99203 OFFICE O/P NEW LOW 30 MIN: CPT | Performed by: OTOLARYNGOLOGY

## 2019-07-08 RX ORDER — FLUTICASONE PROPIONATE 50 MCG
2 SPRAY, SUSPENSION (ML) NASAL 2 TIMES DAILY
Qty: 16 G | Refills: 5 | Status: SHIPPED | OUTPATIENT
Start: 2019-07-08 | End: 2019-08-08 | Stop reason: SDUPTHER

## 2019-07-08 NOTE — PROGRESS NOTES
Subjective   Magy Barton is a 78 y.o. female.   Ear problems  History of Present Illness   She has had worsening hearing loss last few weeks worse in the left and right she denies any pain drainage of the ear she is been placed on antibiotics but still having problems.  She has a known history of diabetes but her son said she did not really have problems with her hearing otherwise until recently  She has had a history of stroke is unclear but she may have been on prednisone though not currently    The following portions of the patient's history were reviewed and updated as appropriate: allergies, current medications, past family history, past medical history, past social history, past surgical history and problem list.      Magy Barton reports that she has never smoked. She has never used smokeless tobacco. She reports that she does not drink alcohol or use drugs.  Patient is not a tobacco user and has not been counseled for use of tobacco products    History reviewed. No pertinent family history.      Current Outpatient Medications:   •  amLODIPine (NORVASC) 10 MG tablet, Take 10 mg by mouth Every Night., Disp: , Rfl:   •  Aspirin (ASPIR-81 PO), Take 81 mg by mouth Daily., Disp: , Rfl:   •  carBAMazepine (TEGretol) 200 MG tablet, Take 200 mg by mouth 2 (Two) Times a Day., Disp: , Rfl:   •  clopidogrel (PLAVIX) 75 MG tablet, Take 75 mg by mouth Daily., Disp: , Rfl:   •  Insulin Glargine (LANTUS SOLOSTAR) 100 UNIT/ML injection pen, Inject 28 Units under the skin Every Night., Disp: , Rfl:   •  lisinopril (PRINIVIL,ZESTRIL) 40 MG tablet, Take 40 mg by mouth Daily., Disp: , Rfl:   •  metoprolol succinate XL (TOPROL-XL) 100 MG 24 hr tablet, Take 100 mg by mouth Every Night., Disp: , Rfl:   •  pravastatin (PRAVACHOL) 20 MG tablet, Take 20 mg by mouth 3 (Three) Times a Week., Disp: , Rfl:   •  fluticasone (FLONASE) 50 MCG/ACT nasal spray, 2 sprays into the nostril(s) as directed by provider 2 (Two) Times a Day.,  Disp: 16 g, Rfl: 5    Allergies   Allergen Reactions   • Atorvastatin      Memory   • Levofloxacin    • Meclizine    • Morphine        Past Medical History:   Diagnosis Date   • Cancer (CMS/HCC) 2009    leukemia   • Coronary artery disease    • Diabetes mellitus (CMS/HCC)    • Hypertension    • Injury of back    • Myocardial infarction (CMS/HCC)    • Stroke (CMS/HCC)        Past Surgical History:   Procedure Laterality Date   • CATARACT EXTRACTION     • TRIGEMINAL NERVE DECOMPRESSION     • TUBAL ABDOMINAL LIGATION     • UVULOPALATOPHARYNGOPLASTY         Review of Systems   Constitutional: Negative for fever.   HENT: Positive for hearing loss. Negative for ear discharge and ear pain.    Neurological: Negative for dizziness.   All other systems reviewed and are negative.          Objective   Physical Exam   Constitutional: She appears well-developed.   HENT:   Head: Normocephalic.   Right Ear: External ear and ear canal normal.   Left Ear: External ear and ear canal normal. No mastoid tenderness. Tympanic membrane is scarred and retracted. A middle ear effusion is present.   Mouth/Throat: Uvula is midline and oropharynx is clear and moist.   Eyes: Conjunctivae are normal.   Neck: Normal range of motion.   Pulmonary/Chest: Effort normal.   Musculoskeletal: Normal range of motion.   Skin: Skin is warm.   Psychiatric: She has a normal mood and affect.     Audiogram was reviewed with the patient son showing mixed hearing loss in the left and bilateral sensorineural hearing loss tympanograms suggest fluid in the left ear        Assessment/Plan   Magy was seen today for ear problem.    Diagnoses and all orders for this visit:    Mixed conductive and sensorineural hearing loss of left ear with unrestricted hearing of right ear    Chronic serous otitis media, left ear    Other orders  -     fluticasone (FLONASE) 50 MCG/ACT nasal spray; 2 sprays into the nostril(s) as directed by provider 2 (Two) Times a Day.      Discussed  treatment options which are somewhat limited she may eventually need a tube placed placed on intranasal steroid spray twice a day.  We are avoiding decongestants because of her other medical problems and avoiding steroids because of her diabetes    I talked about the difficulties with she and her son about treatment options they want to try to avoid tube placement we will recheck in 3 weeks and reassess with audiogram there is no active evidence of infection so need for antibiotics is not necessary at this point

## 2019-07-08 NOTE — PATIENT INSTRUCTIONS

## 2019-08-08 ENCOUNTER — CLINICAL SUPPORT (OUTPATIENT)
Dept: AUDIOLOGY | Facility: CLINIC | Age: 79
End: 2019-08-08

## 2019-08-08 ENCOUNTER — OFFICE VISIT (OUTPATIENT)
Dept: OTOLARYNGOLOGY | Facility: CLINIC | Age: 79
End: 2019-08-08

## 2019-08-08 VITALS — WEIGHT: 184.2 LBS | HEIGHT: 62 IN | TEMPERATURE: 97.8 F | BODY MASS INDEX: 33.9 KG/M2

## 2019-08-08 DIAGNOSIS — H68.012 EUSTACHIAN SALPINGITIS, ACUTE, LEFT: ICD-10-CM

## 2019-08-08 DIAGNOSIS — H65.22 CHRONIC SEROUS OTITIS MEDIA, LEFT EAR: ICD-10-CM

## 2019-08-08 DIAGNOSIS — H69.82 EUSTACHIAN TUBE DYSFUNCTION, LEFT: ICD-10-CM

## 2019-08-08 DIAGNOSIS — H91.8X3 OTHER SPECIFIED HEARING LOSS, BILATERAL: Primary | ICD-10-CM

## 2019-08-08 DIAGNOSIS — H90.72 MIXED CONDUCTIVE AND SENSORINEURAL HEARING LOSS OF LEFT EAR WITH UNRESTRICTED HEARING OF RIGHT EAR: Primary | ICD-10-CM

## 2019-08-08 PROCEDURE — 99213 OFFICE O/P EST LOW 20 MIN: CPT | Performed by: OTOLARYNGOLOGY

## 2019-08-08 PROCEDURE — 92511 NASOPHARYNGOSCOPY: CPT | Performed by: OTOLARYNGOLOGY

## 2019-08-08 RX ORDER — FLUTICASONE PROPIONATE 50 MCG
2 SPRAY, SUSPENSION (ML) NASAL 2 TIMES DAILY
Qty: 16 G | Refills: 5 | Status: SHIPPED | OUTPATIENT
Start: 2019-08-08 | End: 2022-09-20 | Stop reason: HOSPADM

## 2019-08-08 NOTE — PATIENT INSTRUCTIONS

## 2019-08-08 NOTE — PROGRESS NOTES
Subjective   Magy Barton is a 78 y.o. female.     Follow-up ear problem  History of Present Illness   She states she took her medications regularly  Denies pain or ear drainage or vertigo still has hearing loss    The following portions of the patient's history were reviewed and updated as appropriate: allergies, current medications, past family history, past medical history, past social history, past surgical history and problem list.      Current Outpatient Medications:   •  amLODIPine (NORVASC) 10 MG tablet, Take 10 mg by mouth Every Night., Disp: , Rfl:   •  Aspirin (ASPIR-81 PO), Take 81 mg by mouth Daily., Disp: , Rfl:   •  carBAMazepine (TEGretol) 200 MG tablet, Take 200 mg by mouth 2 (Two) Times a Day., Disp: , Rfl:   •  clopidogrel (PLAVIX) 75 MG tablet, Take 75 mg by mouth Daily., Disp: , Rfl:   •  fluticasone (FLONASE) 50 MCG/ACT nasal spray, 2 sprays into the nostril(s) as directed by provider 2 (Two) Times a Day., Disp: 16 g, Rfl: 5  •  Insulin Glargine (LANTUS SOLOSTAR) 100 UNIT/ML injection pen, Inject 28 Units under the skin Every Night., Disp: , Rfl:   •  lisinopril (PRINIVIL,ZESTRIL) 40 MG tablet, Take 40 mg by mouth Daily., Disp: , Rfl:   •  metoprolol succinate XL (TOPROL-XL) 100 MG 24 hr tablet, Take 100 mg by mouth Every Night., Disp: , Rfl:   •  pravastatin (PRAVACHOL) 20 MG tablet, Take 20 mg by mouth 3 (Three) Times a Week., Disp: , Rfl:     Allergies   Allergen Reactions   • Atorvastatin      Memory   • Levofloxacin    • Meclizine    • Morphine              Review of Systems   Constitutional: Negative for fever.   HENT: Positive for hearing loss. Negative for ear discharge and sinus pain.    Neurological: Negative for dizziness.           Objective   Physical Exam   Constitutional: She appears well-developed.   HENT:   Head: Normocephalic.   Right Ear: External ear and ear canal normal.   Left Ear: External ear and ear canal normal. No mastoid tenderness. Tympanic membrane is scarred and  retracted. A middle ear effusion is present.   Nose: Septal deviation present. No mucosal edema.   Mouth/Throat: Uvula is midline, oropharynx is clear and moist and mucous membranes are normal.   Eyes: Conjunctivae are normal.   Neck: Normal range of motion.   Pulmonary/Chest: Effort normal.   Musculoskeletal: Normal range of motion.   Skin: Skin is warm.   Psychiatric: She has a normal mood and affect.     Audiogram was done and reviewed with the patient showing mixed hearing loss and fluid in the left ear actual tracings were shown to her    Procedure note: Nasopharyngoscopy was done without complication patient tolerated well after use of topical lidocaine and Afrin nasal spray there were no comp bleeding in the she has minimal adenoid tissue no obvious mass or lesion of the nasopharynx no bleeding is noted she does have a deviated septum  Assessment/Plan   Magy was seen today for follow-up.    Diagnoses and all orders for this visit:    Mixed conductive and sensorineural hearing loss of left ear with unrestricted hearing of right ear    Chronic serous otitis media, left ear    Eustachian salpingitis, acute, left    Other orders  -     fluticasone (FLONASE) 50 MCG/ACT nasal spray; 2 sprays into the nostril(s) as directed by provider 2 (Two) Times a Day.    Notes given the patient's family because they did not come with her    She went to take a conservative program which continue the medicine longer told her it is unlikely work   a tube should be considered but will hold off  We discussed the risk benefits and myringotomy versus further indications she wants to take medications told her it is unlikely to solve the problem but she wants to do that for 3 more weeks    Since her nasopharyngoscopy is does not show any tumor mass we will see her back in 3 weeks and I suggested consideration of myringotomy and PE tube she wants to do it under anesthesia not in the office

## 2019-08-08 NOTE — PROGRESS NOTES
STANDARD AUDIOMETRIC EVALUATION      Name:  Magy Barton  :  1940  Age:  78 y.o.  Date of Evaluation:  2019      HISTORY    Reason for visit:  Magy Barton is seen today for a hearing test at the request of Dr. Robel Isaacs.  Patient reports her ears feel better, and her hearing seems better, but her ears still feel a little stopped up.      EVALUATION    See Audiogram    RESULTS        Otoscopy and Tympanometry 226 Hz :  Right Ear:  Otoscopy:  Clear ear canal          Tympanometry:  Middle ear function within normal limits    Left Ear:   Otoscopy:  Clear ear canal        Tympanometry:  Reduced pressure and compliance consistent with outer/middle ear involvement    Test technique:  Standard Audiometry     Pure Tone Audiometry:   Patient responded to pure tones at 20-65 dB for 250-8000 Hz in right ear, and at 30-80 dB for 250-8000 Hz in left ear.       Speech Audiometry:        Right Ear:  Speech Reception Threshold (SRT) was obtained at 25 dBHL                 Speech Discrimination scores were 96% in quiet when words were presented at 65 dBHL       Left Ear:  Speech Reception Threshold (SRT) was obtained at 45 dBHL                 Speech Discrimination scores were 96% in masking noise when words were presented at  85 dBHL    Reliability:   good    IMPRESSIONS:  1.  Tympanometry results are consistent with Middle ear function within normal limits in right ear, and Reduced pressure and compliance consistent with outer/middle ear involvement in left ear.  2.  Pure tone results are consistent with mild to moderate sloping sensorineural hearing loss  for right ear, and mild to severe sloping mixed hearing loss  in left ear.       RECOMMENDATIONS:  Patient is seeing the Ear Nose and Throat physician immediately following this examination.  It was a pleasure seeing Magy Barton in Audiology today.  We would be happy to do further testing or discuss these test as necessary.          This document has been  electronically signed by Kelle Rogers MS CCC-A on August 8, 2019 4:40 PM       Kelle Rogers MS CCC-A  Licensed Audiologist

## 2021-01-20 ENCOUNTER — IMMUNIZATION (OUTPATIENT)
Dept: VACCINE CLINIC | Facility: HOSPITAL | Age: 81
End: 2021-01-20

## 2021-01-20 PROCEDURE — 0001A: CPT | Performed by: THORACIC SURGERY (CARDIOTHORACIC VASCULAR SURGERY)

## 2021-01-20 PROCEDURE — 91300 HC SARSCOV02 VAC 30MCG/0.3ML IM: CPT | Performed by: THORACIC SURGERY (CARDIOTHORACIC VASCULAR SURGERY)

## 2021-02-10 ENCOUNTER — IMMUNIZATION (OUTPATIENT)
Dept: VACCINE CLINIC | Facility: HOSPITAL | Age: 81
End: 2021-02-10

## 2021-02-10 PROCEDURE — 0002A: CPT | Performed by: THORACIC SURGERY (CARDIOTHORACIC VASCULAR SURGERY)

## 2021-02-10 PROCEDURE — 91300 HC SARSCOV02 VAC 30MCG/0.3ML IM: CPT | Performed by: THORACIC SURGERY (CARDIOTHORACIC VASCULAR SURGERY)

## 2021-04-23 ENCOUNTER — APPOINTMENT (OUTPATIENT)
Dept: CT IMAGING | Facility: HOSPITAL | Age: 81
End: 2021-04-23

## 2021-04-23 ENCOUNTER — HOSPITAL ENCOUNTER (INPATIENT)
Facility: HOSPITAL | Age: 81
LOS: 5 days | Discharge: NURSING, REHAB OR LONG-TERM CARE | End: 2021-04-29
Attending: FAMILY MEDICINE | Admitting: INTERNAL MEDICINE

## 2021-04-23 ENCOUNTER — APPOINTMENT (OUTPATIENT)
Dept: GENERAL RADIOLOGY | Facility: HOSPITAL | Age: 81
End: 2021-04-23

## 2021-04-23 DIAGNOSIS — R73.9 HYPERGLYCEMIA: ICD-10-CM

## 2021-04-23 DIAGNOSIS — R31.9 URINARY TRACT INFECTION WITH HEMATURIA, SITE UNSPECIFIED: Primary | ICD-10-CM

## 2021-04-23 DIAGNOSIS — Z78.9 IMPAIRED MOBILITY AND ACTIVITIES OF DAILY LIVING: ICD-10-CM

## 2021-04-23 DIAGNOSIS — N39.0 URINARY TRACT INFECTION WITH HEMATURIA, SITE UNSPECIFIED: Primary | ICD-10-CM

## 2021-04-23 DIAGNOSIS — E86.0 DEHYDRATION: ICD-10-CM

## 2021-04-23 DIAGNOSIS — Z74.09 IMPAIRED MOBILITY AND ACTIVITIES OF DAILY LIVING: ICD-10-CM

## 2021-04-23 DIAGNOSIS — Z74.09 IMPAIRED FUNCTIONAL MOBILITY, BALANCE, GAIT, AND ENDURANCE: ICD-10-CM

## 2021-04-23 DIAGNOSIS — R53.1 WEAKNESS GENERALIZED: ICD-10-CM

## 2021-04-23 LAB
ALBUMIN SERPL-MCNC: 3.3 G/DL (ref 3.5–5.2)
ALBUMIN/GLOB SERPL: 0.9 G/DL
ALP SERPL-CCNC: 182 U/L (ref 39–117)
ALT SERPL W P-5'-P-CCNC: 12 U/L (ref 1–33)
ANION GAP SERPL CALCULATED.3IONS-SCNC: 11 MMOL/L (ref 5–15)
AST SERPL-CCNC: 19 U/L (ref 1–32)
BACTERIA UR QL AUTO: ABNORMAL /HPF
BASOPHILS # BLD AUTO: 0.05 10*3/MM3 (ref 0–0.2)
BASOPHILS NFR BLD AUTO: 0.4 % (ref 0–1.5)
BILIRUB SERPL-MCNC: 0.4 MG/DL (ref 0–1.2)
BILIRUB UR QL STRIP: ABNORMAL
BUN SERPL-MCNC: 16 MG/DL (ref 8–23)
BUN/CREAT SERPL: 13.2 (ref 7–25)
CALCIUM SPEC-SCNC: 8.7 MG/DL (ref 8.6–10.5)
CHLORIDE SERPL-SCNC: 96 MMOL/L (ref 98–107)
CK SERPL-CCNC: 70 U/L (ref 20–180)
CLARITY UR: ABNORMAL
CO2 SERPL-SCNC: 25 MMOL/L (ref 22–29)
COLOR UR: YELLOW
CREAT SERPL-MCNC: 1.21 MG/DL (ref 0.57–1)
DEPRECATED RDW RBC AUTO: 42.6 FL (ref 37–54)
EOSINOPHIL # BLD AUTO: 0.01 10*3/MM3 (ref 0–0.4)
EOSINOPHIL NFR BLD AUTO: 0.1 % (ref 0.3–6.2)
ERYTHROCYTE [DISTWIDTH] IN BLOOD BY AUTOMATED COUNT: 12.9 % (ref 12.3–15.4)
FLUAV RNA RESP QL NAA+PROBE: NOT DETECTED
FLUBV RNA RESP QL NAA+PROBE: NOT DETECTED
GFR SERPL CREATININE-BSD FRML MDRD: 43 ML/MIN/1.73
GLOBULIN UR ELPH-MCNC: 3.6 GM/DL
GLUCOSE SERPL-MCNC: 311 MG/DL (ref 65–99)
GLUCOSE UR STRIP-MCNC: ABNORMAL MG/DL
GRAN CASTS URNS QL MICRO: ABNORMAL /LPF
HCT VFR BLD AUTO: 38.8 % (ref 34–46.6)
HGB BLD-MCNC: 13.1 G/DL (ref 12–15.9)
HGB UR QL STRIP.AUTO: ABNORMAL
HOLD SPECIMEN: NORMAL
HYALINE CASTS UR QL AUTO: ABNORMAL /LPF
IMM GRANULOCYTES # BLD AUTO: 0.11 10*3/MM3 (ref 0–0.05)
IMM GRANULOCYTES NFR BLD AUTO: 0.9 % (ref 0–0.5)
KETONES UR QL STRIP: ABNORMAL
LEUKOCYTE ESTERASE UR QL STRIP.AUTO: ABNORMAL
LIPASE SERPL-CCNC: 21 U/L (ref 13–60)
LYMPHOCYTES # BLD AUTO: 0.85 10*3/MM3 (ref 0.7–3.1)
LYMPHOCYTES NFR BLD AUTO: 7.3 % (ref 19.6–45.3)
MAGNESIUM SERPL-MCNC: 1.8 MG/DL (ref 1.6–2.4)
MCH RBC QN AUTO: 30.8 PG (ref 26.6–33)
MCHC RBC AUTO-ENTMCNC: 33.8 G/DL (ref 31.5–35.7)
MCV RBC AUTO: 91.1 FL (ref 79–97)
MONOCYTES # BLD AUTO: 1.01 10*3/MM3 (ref 0.1–0.9)
MONOCYTES NFR BLD AUTO: 8.7 % (ref 5–12)
NEUTROPHILS NFR BLD AUTO: 82.6 % (ref 42.7–76)
NEUTROPHILS NFR BLD AUTO: 9.62 10*3/MM3 (ref 1.7–7)
NITRITE UR QL STRIP: NEGATIVE
NRBC BLD AUTO-RTO: 0 /100 WBC (ref 0–0.2)
PH UR STRIP.AUTO: 6 [PH] (ref 5–9)
PLATELET # BLD AUTO: 223 10*3/MM3 (ref 140–450)
PMV BLD AUTO: 9.4 FL (ref 6–12)
POTASSIUM SERPL-SCNC: 3.6 MMOL/L (ref 3.5–5.2)
PROT SERPL-MCNC: 6.9 G/DL (ref 6–8.5)
PROT UR QL STRIP: ABNORMAL
RBC # BLD AUTO: 4.26 10*6/MM3 (ref 3.77–5.28)
RBC # UR: ABNORMAL /HPF
REF LAB TEST METHOD: ABNORMAL
SARS-COV-2 RNA RESP QL NAA+PROBE: NOT DETECTED
SODIUM SERPL-SCNC: 132 MMOL/L (ref 136–145)
SP GR UR STRIP: 1.02 (ref 1–1.03)
SQUAMOUS #/AREA URNS HPF: ABNORMAL /HPF
UROBILINOGEN UR QL STRIP: ABNORMAL
WBC # BLD AUTO: 11.65 10*3/MM3 (ref 3.4–10.8)
WBC UR QL AUTO: ABNORMAL /HPF
WHOLE BLOOD HOLD SPECIMEN: NORMAL

## 2021-04-23 PROCEDURE — G0378 HOSPITAL OBSERVATION PER HR: HCPCS

## 2021-04-23 PROCEDURE — 87186 SC STD MICRODIL/AGAR DIL: CPT | Performed by: FAMILY MEDICINE

## 2021-04-23 PROCEDURE — 70450 CT HEAD/BRAIN W/O DYE: CPT

## 2021-04-23 PROCEDURE — 25010000002 CEFTRIAXONE PER 250 MG: Performed by: FAMILY MEDICINE

## 2021-04-23 PROCEDURE — 74022 RADEX COMPL AQT ABD SERIES: CPT

## 2021-04-23 PROCEDURE — 87636 SARSCOV2 & INF A&B AMP PRB: CPT | Performed by: FAMILY MEDICINE

## 2021-04-23 PROCEDURE — 93005 ELECTROCARDIOGRAM TRACING: CPT | Performed by: FAMILY MEDICINE

## 2021-04-23 PROCEDURE — 83735 ASSAY OF MAGNESIUM: CPT | Performed by: FAMILY MEDICINE

## 2021-04-23 PROCEDURE — 87040 BLOOD CULTURE FOR BACTERIA: CPT | Performed by: FAMILY MEDICINE

## 2021-04-23 PROCEDURE — 87086 URINE CULTURE/COLONY COUNT: CPT | Performed by: FAMILY MEDICINE

## 2021-04-23 PROCEDURE — 83690 ASSAY OF LIPASE: CPT | Performed by: FAMILY MEDICINE

## 2021-04-23 PROCEDURE — 82550 ASSAY OF CK (CPK): CPT | Performed by: FAMILY MEDICINE

## 2021-04-23 PROCEDURE — 99285 EMERGENCY DEPT VISIT HI MDM: CPT

## 2021-04-23 PROCEDURE — 85025 COMPLETE CBC W/AUTO DIFF WBC: CPT | Performed by: FAMILY MEDICINE

## 2021-04-23 PROCEDURE — 81001 URINALYSIS AUTO W/SCOPE: CPT | Performed by: FAMILY MEDICINE

## 2021-04-23 PROCEDURE — 87077 CULTURE AEROBIC IDENTIFY: CPT | Performed by: FAMILY MEDICINE

## 2021-04-23 PROCEDURE — 80053 COMPREHEN METABOLIC PANEL: CPT | Performed by: FAMILY MEDICINE

## 2021-04-23 RX ORDER — SODIUM CHLORIDE 450 MG/100ML
50 INJECTION, SOLUTION INTRAVENOUS CONTINUOUS
Status: DISCONTINUED | OUTPATIENT
Start: 2021-04-24 | End: 2021-04-29 | Stop reason: HOSPADM

## 2021-04-23 RX ORDER — CLOPIDOGREL BISULFATE 75 MG/1
75 TABLET ORAL DAILY
Status: DISCONTINUED | OUTPATIENT
Start: 2021-04-24 | End: 2021-04-29 | Stop reason: HOSPADM

## 2021-04-23 RX ORDER — NICOTINE POLACRILEX 4 MG
15 LOZENGE BUCCAL
Status: DISCONTINUED | OUTPATIENT
Start: 2021-04-23 | End: 2021-04-29 | Stop reason: HOSPADM

## 2021-04-23 RX ORDER — ASPIRIN 81 MG/1
81 TABLET ORAL DAILY
Status: DISCONTINUED | OUTPATIENT
Start: 2021-04-24 | End: 2021-04-29 | Stop reason: HOSPADM

## 2021-04-23 RX ORDER — ACETAMINOPHEN 325 MG/1
650 TABLET ORAL EVERY 4 HOURS PRN
Status: DISCONTINUED | OUTPATIENT
Start: 2021-04-23 | End: 2021-04-29 | Stop reason: HOSPADM

## 2021-04-23 RX ORDER — FAMOTIDINE 40 MG/1
40 TABLET, FILM COATED ORAL DAILY
Status: DISCONTINUED | OUTPATIENT
Start: 2021-04-24 | End: 2021-04-29 | Stop reason: HOSPADM

## 2021-04-23 RX ORDER — PRAVASTATIN SODIUM 20 MG
20 TABLET ORAL 3 TIMES WEEKLY
Status: DISCONTINUED | OUTPATIENT
Start: 2021-04-24 | End: 2021-04-29 | Stop reason: HOSPADM

## 2021-04-23 RX ORDER — FLUTICASONE PROPIONATE 50 MCG
2 SPRAY, SUSPENSION (ML) NASAL 2 TIMES DAILY
Status: DISCONTINUED | OUTPATIENT
Start: 2021-04-24 | End: 2021-04-24

## 2021-04-23 RX ORDER — ONDANSETRON 2 MG/ML
4 INJECTION INTRAMUSCULAR; INTRAVENOUS EVERY 6 HOURS PRN
Status: DISCONTINUED | OUTPATIENT
Start: 2021-04-23 | End: 2021-04-29 | Stop reason: HOSPADM

## 2021-04-23 RX ORDER — METOPROLOL SUCCINATE 50 MG/1
100 TABLET, EXTENDED RELEASE ORAL NIGHTLY
Status: DISCONTINUED | OUTPATIENT
Start: 2021-04-24 | End: 2021-04-29 | Stop reason: HOSPADM

## 2021-04-23 RX ORDER — SODIUM CHLORIDE 0.9 % (FLUSH) 0.9 %
10 SYRINGE (ML) INJECTION AS NEEDED
Status: DISCONTINUED | OUTPATIENT
Start: 2021-04-23 | End: 2021-04-29 | Stop reason: HOSPADM

## 2021-04-23 RX ORDER — SODIUM CHLORIDE 9 MG/ML
125 INJECTION, SOLUTION INTRAVENOUS CONTINUOUS
Status: DISCONTINUED | OUTPATIENT
Start: 2021-04-23 | End: 2021-04-24

## 2021-04-23 RX ORDER — ACETAMINOPHEN 650 MG/1
650 SUPPOSITORY RECTAL EVERY 4 HOURS PRN
Status: DISCONTINUED | OUTPATIENT
Start: 2021-04-23 | End: 2021-04-29 | Stop reason: HOSPADM

## 2021-04-23 RX ORDER — SODIUM CHLORIDE 0.9 % (FLUSH) 0.9 %
10 SYRINGE (ML) INJECTION EVERY 12 HOURS SCHEDULED
Status: DISCONTINUED | OUTPATIENT
Start: 2021-04-24 | End: 2021-04-29 | Stop reason: HOSPADM

## 2021-04-23 RX ORDER — ACETAMINOPHEN 160 MG/5ML
650 SOLUTION ORAL EVERY 4 HOURS PRN
Status: DISCONTINUED | OUTPATIENT
Start: 2021-04-23 | End: 2021-04-24 | Stop reason: SDUPTHER

## 2021-04-23 RX ORDER — LISINOPRIL 40 MG/1
40 TABLET ORAL DAILY
Status: DISCONTINUED | OUTPATIENT
Start: 2021-04-24 | End: 2021-04-29 | Stop reason: HOSPADM

## 2021-04-23 RX ORDER — AMLODIPINE BESYLATE 10 MG/1
10 TABLET ORAL NIGHTLY
Status: DISCONTINUED | OUTPATIENT
Start: 2021-04-24 | End: 2021-04-29 | Stop reason: HOSPADM

## 2021-04-23 RX ORDER — DEXTROSE MONOHYDRATE 25 G/50ML
25 INJECTION, SOLUTION INTRAVENOUS
Status: DISCONTINUED | OUTPATIENT
Start: 2021-04-23 | End: 2021-04-29 | Stop reason: HOSPADM

## 2021-04-23 RX ORDER — CARBAMAZEPINE 200 MG/1
200 TABLET ORAL 2 TIMES DAILY
Status: DISCONTINUED | OUTPATIENT
Start: 2021-04-24 | End: 2021-04-29 | Stop reason: HOSPADM

## 2021-04-23 RX ORDER — LANOLIN ALCOHOL/MO/W.PET/CERES
5 CREAM (GRAM) TOPICAL NIGHTLY PRN
Status: DISCONTINUED | OUTPATIENT
Start: 2021-04-23 | End: 2021-04-29 | Stop reason: HOSPADM

## 2021-04-23 RX ADMIN — SODIUM CHLORIDE 500 ML: 9 INJECTION, SOLUTION INTRAVENOUS at 22:03

## 2021-04-23 RX ADMIN — SODIUM CHLORIDE 1000 ML: 900 INJECTION, SOLUTION INTRAVENOUS at 21:33

## 2021-04-23 RX ADMIN — SODIUM CHLORIDE 125 ML/HR: 9 INJECTION, SOLUTION INTRAVENOUS at 20:13

## 2021-04-23 RX ADMIN — CEFTRIAXONE SODIUM 1 G: 1 INJECTION, POWDER, FOR SOLUTION INTRAMUSCULAR; INTRAVENOUS at 23:15

## 2021-04-23 RX ADMIN — SODIUM CHLORIDE 125 ML/HR: 9 INJECTION, SOLUTION INTRAVENOUS at 21:03

## 2021-04-24 ENCOUNTER — APPOINTMENT (OUTPATIENT)
Dept: CT IMAGING | Facility: HOSPITAL | Age: 81
End: 2021-04-24

## 2021-04-24 ENCOUNTER — APPOINTMENT (OUTPATIENT)
Dept: ULTRASOUND IMAGING | Facility: HOSPITAL | Age: 81
End: 2021-04-24

## 2021-04-24 LAB
ANION GAP SERPL CALCULATED.3IONS-SCNC: 7 MMOL/L (ref 5–15)
BASOPHILS # BLD AUTO: 0.04 10*3/MM3 (ref 0–0.2)
BASOPHILS NFR BLD AUTO: 0.4 % (ref 0–1.5)
BUN SERPL-MCNC: 16 MG/DL (ref 8–23)
BUN/CREAT SERPL: 15.2 (ref 7–25)
CALCIUM SPEC-SCNC: 8.4 MG/DL (ref 8.6–10.5)
CHLORIDE SERPL-SCNC: 95 MMOL/L (ref 98–107)
CO2 SERPL-SCNC: 29 MMOL/L (ref 22–29)
CREAT SERPL-MCNC: 1.05 MG/DL (ref 0.57–1)
D-LACTATE SERPL-SCNC: 0.8 MMOL/L (ref 0.5–2)
DEPRECATED RDW RBC AUTO: 42.6 FL (ref 37–54)
EOSINOPHIL # BLD AUTO: 0.01 10*3/MM3 (ref 0–0.4)
EOSINOPHIL NFR BLD AUTO: 0.1 % (ref 0.3–6.2)
ERYTHROCYTE [DISTWIDTH] IN BLOOD BY AUTOMATED COUNT: 12.9 % (ref 12.3–15.4)
GFR SERPL CREATININE-BSD FRML MDRD: 50 ML/MIN/1.73
GLUCOSE BLDC GLUCOMTR-MCNC: 132 MG/DL (ref 70–130)
GLUCOSE BLDC GLUCOMTR-MCNC: 140 MG/DL (ref 70–130)
GLUCOSE BLDC GLUCOMTR-MCNC: 141 MG/DL (ref 70–130)
GLUCOSE BLDC GLUCOMTR-MCNC: 212 MG/DL (ref 70–130)
GLUCOSE SERPL-MCNC: 239 MG/DL (ref 65–99)
HCT VFR BLD AUTO: 34.6 % (ref 34–46.6)
HGB BLD-MCNC: 11.8 G/DL (ref 12–15.9)
IMM GRANULOCYTES # BLD AUTO: 0.1 10*3/MM3 (ref 0–0.05)
IMM GRANULOCYTES NFR BLD AUTO: 0.9 % (ref 0–0.5)
LYMPHOCYTES # BLD AUTO: 1.26 10*3/MM3 (ref 0.7–3.1)
LYMPHOCYTES NFR BLD AUTO: 11.7 % (ref 19.6–45.3)
MCH RBC QN AUTO: 31.3 PG (ref 26.6–33)
MCHC RBC AUTO-ENTMCNC: 34.1 G/DL (ref 31.5–35.7)
MCV RBC AUTO: 91.8 FL (ref 79–97)
MONOCYTES # BLD AUTO: 1 10*3/MM3 (ref 0.1–0.9)
MONOCYTES NFR BLD AUTO: 9.3 % (ref 5–12)
NEUTROPHILS NFR BLD AUTO: 77.6 % (ref 42.7–76)
NEUTROPHILS NFR BLD AUTO: 8.37 10*3/MM3 (ref 1.7–7)
NRBC BLD AUTO-RTO: 0 /100 WBC (ref 0–0.2)
PLATELET # BLD AUTO: 201 10*3/MM3 (ref 140–450)
PMV BLD AUTO: 9.4 FL (ref 6–12)
POTASSIUM SERPL-SCNC: 3.4 MMOL/L (ref 3.5–5.2)
QT INTERVAL: 392 MS
QTC INTERVAL: 469 MS
RBC # BLD AUTO: 3.77 10*6/MM3 (ref 3.77–5.28)
SODIUM SERPL-SCNC: 131 MMOL/L (ref 136–145)
WBC # BLD AUTO: 10.78 10*3/MM3 (ref 3.4–10.8)

## 2021-04-24 PROCEDURE — 76775 US EXAM ABDO BACK WALL LIM: CPT

## 2021-04-24 PROCEDURE — 63710000001 INSULIN DETEMIR PER 5 UNITS: Performed by: INTERNAL MEDICINE

## 2021-04-24 PROCEDURE — 82962 GLUCOSE BLOOD TEST: CPT

## 2021-04-24 PROCEDURE — 25010000002 CEFTRIAXONE PER 250 MG: Performed by: INTERNAL MEDICINE

## 2021-04-24 PROCEDURE — 80048 BASIC METABOLIC PNL TOTAL CA: CPT | Performed by: INTERNAL MEDICINE

## 2021-04-24 PROCEDURE — 25010000002 ONDANSETRON PER 1 MG: Performed by: INTERNAL MEDICINE

## 2021-04-24 PROCEDURE — 63710000001 INSULIN ASPART PER 5 UNITS: Performed by: INTERNAL MEDICINE

## 2021-04-24 PROCEDURE — 94760 N-INVAS EAR/PLS OXIMETRY 1: CPT

## 2021-04-24 PROCEDURE — 25010000002 ENOXAPARIN PER 10 MG: Performed by: INTERNAL MEDICINE

## 2021-04-24 PROCEDURE — 87040 BLOOD CULTURE FOR BACTERIA: CPT | Performed by: FAMILY MEDICINE

## 2021-04-24 PROCEDURE — 94799 UNLISTED PULMONARY SVC/PX: CPT

## 2021-04-24 PROCEDURE — 74177 CT ABD & PELVIS W/CONTRAST: CPT

## 2021-04-24 PROCEDURE — 36415 COLL VENOUS BLD VENIPUNCTURE: CPT | Performed by: FAMILY MEDICINE

## 2021-04-24 PROCEDURE — 25010000002 IOPAMIDOL 61 % SOLUTION: Performed by: INTERNAL MEDICINE

## 2021-04-24 PROCEDURE — 85025 COMPLETE CBC W/AUTO DIFF WBC: CPT | Performed by: INTERNAL MEDICINE

## 2021-04-24 PROCEDURE — 93010 ELECTROCARDIOGRAM REPORT: CPT | Performed by: INTERNAL MEDICINE

## 2021-04-24 PROCEDURE — 83605 ASSAY OF LACTIC ACID: CPT | Performed by: FAMILY MEDICINE

## 2021-04-24 RX ORDER — HYDRALAZINE HYDROCHLORIDE 20 MG/ML
10 INJECTION INTRAMUSCULAR; INTRAVENOUS EVERY 6 HOURS PRN
Status: DISCONTINUED | OUTPATIENT
Start: 2021-04-24 | End: 2021-04-29 | Stop reason: HOSPADM

## 2021-04-24 RX ORDER — FLUTICASONE PROPIONATE 50 MCG
2 SPRAY, SUSPENSION (ML) NASAL 2 TIMES DAILY
Status: DISCONTINUED | OUTPATIENT
Start: 2021-04-24 | End: 2021-04-29 | Stop reason: HOSPADM

## 2021-04-24 RX ORDER — NYSTATIN 100000 [USP'U]/G
POWDER TOPICAL EVERY 12 HOURS SCHEDULED
Status: DISCONTINUED | OUTPATIENT
Start: 2021-04-24 | End: 2021-04-29 | Stop reason: HOSPADM

## 2021-04-24 RX ORDER — VENLAFAXINE 37.5 MG/1
37.5 TABLET ORAL 2 TIMES DAILY
COMMUNITY

## 2021-04-24 RX ADMIN — LISINOPRIL 40 MG: 40 TABLET ORAL at 08:23

## 2021-04-24 RX ADMIN — ENOXAPARIN SODIUM 30 MG: 30 INJECTION SUBCUTANEOUS at 08:23

## 2021-04-24 RX ADMIN — ONDANSETRON 4 MG: 2 INJECTION INTRAMUSCULAR; INTRAVENOUS at 12:40

## 2021-04-24 RX ADMIN — NYSTATIN: 100000 POWDER TOPICAL at 22:17

## 2021-04-24 RX ADMIN — SODIUM CHLORIDE, PRESERVATIVE FREE 10 ML: 5 INJECTION INTRAVENOUS at 22:21

## 2021-04-24 RX ADMIN — PRAVASTATIN SODIUM 20 MG: 20 TABLET ORAL at 01:01

## 2021-04-24 RX ADMIN — MELATONIN 5.25 MG: 3 TAB ORAL at 01:00

## 2021-04-24 RX ADMIN — ACETAMINOPHEN 650 MG: 325 TABLET, FILM COATED ORAL at 22:17

## 2021-04-24 RX ADMIN — CLOPIDOGREL BISULFATE 75 MG: 75 TABLET ORAL at 08:23

## 2021-04-24 RX ADMIN — FLUTICASONE PROPIONATE 2 SPRAY: 50 SPRAY, METERED NASAL at 22:17

## 2021-04-24 RX ADMIN — AMLODIPINE BESYLATE 10 MG: 10 TABLET ORAL at 01:01

## 2021-04-24 RX ADMIN — AMLODIPINE BESYLATE 10 MG: 10 TABLET ORAL at 22:18

## 2021-04-24 RX ADMIN — CARBAMAZEPINE 200 MG: 200 TABLET ORAL at 01:00

## 2021-04-24 RX ADMIN — IOPAMIDOL 90 ML: 612 INJECTION, SOLUTION INTRAVENOUS at 17:58

## 2021-04-24 RX ADMIN — METOPROLOL SUCCINATE 100 MG: 50 TABLET, EXTENDED RELEASE ORAL at 22:18

## 2021-04-24 RX ADMIN — FAMOTIDINE 40 MG: 40 TABLET, FILM COATED ORAL at 08:23

## 2021-04-24 RX ADMIN — METOPROLOL SUCCINATE 100 MG: 50 TABLET, EXTENDED RELEASE ORAL at 01:01

## 2021-04-24 RX ADMIN — CEFTRIAXONE SODIUM 1 G: 1 INJECTION, POWDER, FOR SOLUTION INTRAMUSCULAR; INTRAVENOUS at 22:17

## 2021-04-24 RX ADMIN — SODIUM CHLORIDE 50 ML/HR: 4.5 INJECTION, SOLUTION INTRAVENOUS at 01:00

## 2021-04-24 RX ADMIN — ASPIRIN 81 MG: 81 TABLET, COATED ORAL at 08:23

## 2021-04-24 RX ADMIN — SODIUM CHLORIDE, PRESERVATIVE FREE 10 ML: 5 INJECTION INTRAVENOUS at 00:45

## 2021-04-24 RX ADMIN — NYSTATIN 1 APPLICATION: 100000 OINTMENT TOPICAL at 22:20

## 2021-04-24 RX ADMIN — FLUTICASONE PROPIONATE 2 SPRAY: 50 SPRAY, METERED NASAL at 05:37

## 2021-04-24 RX ADMIN — SODIUM CHLORIDE 50 ML/HR: 4.5 INJECTION, SOLUTION INTRAVENOUS at 22:34

## 2021-04-24 RX ADMIN — CARBAMAZEPINE 200 MG: 200 TABLET ORAL at 22:34

## 2021-04-24 RX ADMIN — INSULIN ASPART 4 UNITS: 100 INJECTION, SOLUTION INTRAVENOUS; SUBCUTANEOUS at 08:54

## 2021-04-24 RX ADMIN — CARBAMAZEPINE 200 MG: 200 TABLET ORAL at 08:23

## 2021-04-24 RX ADMIN — INSULIN DETEMIR 28 UNITS: 100 INJECTION, SOLUTION SUBCUTANEOUS at 05:37

## 2021-04-25 LAB
ANION GAP SERPL CALCULATED.3IONS-SCNC: 9 MMOL/L (ref 5–15)
BACTERIA SPEC AEROBE CULT: ABNORMAL
BUN SERPL-MCNC: 17 MG/DL (ref 8–23)
BUN/CREAT SERPL: 17 (ref 7–25)
CALCIUM SPEC-SCNC: 8.6 MG/DL (ref 8.6–10.5)
CHLORIDE SERPL-SCNC: 100 MMOL/L (ref 98–107)
CO2 SERPL-SCNC: 27 MMOL/L (ref 22–29)
CREAT SERPL-MCNC: 1 MG/DL (ref 0.57–1)
DEPRECATED RDW RBC AUTO: 42.6 FL (ref 37–54)
ERYTHROCYTE [DISTWIDTH] IN BLOOD BY AUTOMATED COUNT: 12.6 % (ref 12.3–15.4)
GFR SERPL CREATININE-BSD FRML MDRD: 53 ML/MIN/1.73
GLUCOSE BLDC GLUCOMTR-MCNC: 154 MG/DL (ref 70–130)
GLUCOSE BLDC GLUCOMTR-MCNC: 205 MG/DL (ref 70–130)
GLUCOSE BLDC GLUCOMTR-MCNC: 211 MG/DL (ref 70–130)
GLUCOSE BLDC GLUCOMTR-MCNC: 98 MG/DL (ref 70–130)
GLUCOSE SERPL-MCNC: 139 MG/DL (ref 65–99)
HCT VFR BLD AUTO: 36.9 % (ref 34–46.6)
HGB BLD-MCNC: 12.6 G/DL (ref 12–15.9)
MCH RBC QN AUTO: 31.4 PG (ref 26.6–33)
MCHC RBC AUTO-ENTMCNC: 34.1 G/DL (ref 31.5–35.7)
MCV RBC AUTO: 92 FL (ref 79–97)
PLATELET # BLD AUTO: 191 10*3/MM3 (ref 140–450)
PMV BLD AUTO: 9.6 FL (ref 6–12)
POTASSIUM SERPL-SCNC: 3.6 MMOL/L (ref 3.5–5.2)
RBC # BLD AUTO: 4.01 10*6/MM3 (ref 3.77–5.28)
SODIUM SERPL-SCNC: 136 MMOL/L (ref 136–145)
WBC # BLD AUTO: 7.57 10*3/MM3 (ref 3.4–10.8)

## 2021-04-25 PROCEDURE — 63710000001 INSULIN ASPART PER 5 UNITS: Performed by: INTERNAL MEDICINE

## 2021-04-25 PROCEDURE — 82962 GLUCOSE BLOOD TEST: CPT

## 2021-04-25 PROCEDURE — 80048 BASIC METABOLIC PNL TOTAL CA: CPT | Performed by: NURSE PRACTITIONER

## 2021-04-25 PROCEDURE — 25010000002 PIPERACILLIN SOD-TAZOBACTAM PER 1 G: Performed by: NURSE PRACTITIONER

## 2021-04-25 PROCEDURE — 63710000001 INSULIN DETEMIR PER 5 UNITS: Performed by: INTERNAL MEDICINE

## 2021-04-25 PROCEDURE — 25010000002 ONDANSETRON PER 1 MG: Performed by: INTERNAL MEDICINE

## 2021-04-25 PROCEDURE — 25010000002 ENOXAPARIN PER 10 MG: Performed by: INTERNAL MEDICINE

## 2021-04-25 PROCEDURE — 85027 COMPLETE CBC AUTOMATED: CPT | Performed by: NURSE PRACTITIONER

## 2021-04-25 RX ADMIN — LISINOPRIL 40 MG: 40 TABLET ORAL at 08:42

## 2021-04-25 RX ADMIN — NYSTATIN: 100000 POWDER TOPICAL at 08:42

## 2021-04-25 RX ADMIN — INSULIN ASPART 2 UNITS: 100 INJECTION, SOLUTION INTRAVENOUS; SUBCUTANEOUS at 11:14

## 2021-04-25 RX ADMIN — PIPERACILLIN SODIUM AND TAZOBACTAM SODIUM 3.38 G: 3; .375 INJECTION, POWDER, LYOPHILIZED, FOR SOLUTION INTRAVENOUS at 11:14

## 2021-04-25 RX ADMIN — INSULIN DETEMIR 28 UNITS: 100 INJECTION, SOLUTION SUBCUTANEOUS at 20:28

## 2021-04-25 RX ADMIN — AMLODIPINE BESYLATE 10 MG: 10 TABLET ORAL at 20:14

## 2021-04-25 RX ADMIN — FAMOTIDINE 40 MG: 40 TABLET, FILM COATED ORAL at 08:42

## 2021-04-25 RX ADMIN — METOPROLOL SUCCINATE 100 MG: 50 TABLET, EXTENDED RELEASE ORAL at 20:14

## 2021-04-25 RX ADMIN — ONDANSETRON 4 MG: 2 INJECTION INTRAMUSCULAR; INTRAVENOUS at 15:08

## 2021-04-25 RX ADMIN — NYSTATIN: 100000 POWDER TOPICAL at 20:17

## 2021-04-25 RX ADMIN — CLOPIDOGREL BISULFATE 75 MG: 75 TABLET ORAL at 08:42

## 2021-04-25 RX ADMIN — SODIUM CHLORIDE, PRESERVATIVE FREE 10 ML: 5 INJECTION INTRAVENOUS at 20:17

## 2021-04-25 RX ADMIN — PIPERACILLIN SODIUM AND TAZOBACTAM SODIUM 3.38 G: 3; .375 INJECTION, POWDER, LYOPHILIZED, FOR SOLUTION INTRAVENOUS at 17:21

## 2021-04-25 RX ADMIN — ACETAMINOPHEN 650 MG: 325 TABLET, FILM COATED ORAL at 20:14

## 2021-04-25 RX ADMIN — FLUTICASONE PROPIONATE 2 SPRAY: 50 SPRAY, METERED NASAL at 20:18

## 2021-04-25 RX ADMIN — ENOXAPARIN SODIUM 30 MG: 30 INJECTION SUBCUTANEOUS at 08:42

## 2021-04-25 RX ADMIN — INSULIN ASPART 4 UNITS: 100 INJECTION, SOLUTION INTRAVENOUS; SUBCUTANEOUS at 17:21

## 2021-04-25 RX ADMIN — NYSTATIN 1 APPLICATION: 100000 OINTMENT TOPICAL at 08:43

## 2021-04-25 RX ADMIN — ASPIRIN 81 MG: 81 TABLET, COATED ORAL at 08:42

## 2021-04-25 RX ADMIN — CARBAMAZEPINE 200 MG: 200 TABLET ORAL at 20:14

## 2021-04-25 RX ADMIN — SODIUM CHLORIDE, PRESERVATIVE FREE 10 ML: 5 INJECTION INTRAVENOUS at 08:42

## 2021-04-25 RX ADMIN — CARBAMAZEPINE 200 MG: 200 TABLET ORAL at 08:42

## 2021-04-25 RX ADMIN — NYSTATIN 1 APPLICATION: 100000 OINTMENT TOPICAL at 20:17

## 2021-04-26 LAB
ANION GAP SERPL CALCULATED.3IONS-SCNC: 7 MMOL/L (ref 5–15)
BUN SERPL-MCNC: 18 MG/DL (ref 8–23)
BUN/CREAT SERPL: 16.8 (ref 7–25)
CALCIUM SPEC-SCNC: 8.7 MG/DL (ref 8.6–10.5)
CHLORIDE SERPL-SCNC: 102 MMOL/L (ref 98–107)
CO2 SERPL-SCNC: 29 MMOL/L (ref 22–29)
CREAT SERPL-MCNC: 1.07 MG/DL (ref 0.57–1)
DEPRECATED RDW RBC AUTO: 43.3 FL (ref 37–54)
ERYTHROCYTE [DISTWIDTH] IN BLOOD BY AUTOMATED COUNT: 12.7 % (ref 12.3–15.4)
GFR SERPL CREATININE-BSD FRML MDRD: 49 ML/MIN/1.73
GLUCOSE BLDC GLUCOMTR-MCNC: 102 MG/DL (ref 70–130)
GLUCOSE BLDC GLUCOMTR-MCNC: 123 MG/DL (ref 70–130)
GLUCOSE BLDC GLUCOMTR-MCNC: 152 MG/DL (ref 70–130)
GLUCOSE SERPL-MCNC: 77 MG/DL (ref 65–99)
HCT VFR BLD AUTO: 36.1 % (ref 34–46.6)
HGB BLD-MCNC: 12.1 G/DL (ref 12–15.9)
MCH RBC QN AUTO: 31.2 PG (ref 26.6–33)
MCHC RBC AUTO-ENTMCNC: 33.5 G/DL (ref 31.5–35.7)
MCV RBC AUTO: 93 FL (ref 79–97)
PLATELET # BLD AUTO: 206 10*3/MM3 (ref 140–450)
PMV BLD AUTO: 8.9 FL (ref 6–12)
POTASSIUM SERPL-SCNC: 3.1 MMOL/L (ref 3.5–5.2)
RBC # BLD AUTO: 3.88 10*6/MM3 (ref 3.77–5.28)
SODIUM SERPL-SCNC: 138 MMOL/L (ref 136–145)
WBC # BLD AUTO: 5.91 10*3/MM3 (ref 3.4–10.8)

## 2021-04-26 PROCEDURE — 85027 COMPLETE CBC AUTOMATED: CPT | Performed by: NURSE PRACTITIONER

## 2021-04-26 PROCEDURE — 25010000002 HYDRALAZINE PER 20 MG: Performed by: INTERNAL MEDICINE

## 2021-04-26 PROCEDURE — 25010000002 ONDANSETRON PER 1 MG: Performed by: INTERNAL MEDICINE

## 2021-04-26 PROCEDURE — 25010000002 PIPERACILLIN SOD-TAZOBACTAM PER 1 G: Performed by: NURSE PRACTITIONER

## 2021-04-26 PROCEDURE — 63710000001 INSULIN DETEMIR PER 5 UNITS: Performed by: INTERNAL MEDICINE

## 2021-04-26 PROCEDURE — 80048 BASIC METABOLIC PNL TOTAL CA: CPT | Performed by: NURSE PRACTITIONER

## 2021-04-26 PROCEDURE — 97163 PT EVAL HIGH COMPLEX 45 MIN: CPT

## 2021-04-26 PROCEDURE — 82962 GLUCOSE BLOOD TEST: CPT

## 2021-04-26 PROCEDURE — 25010000002 ENOXAPARIN PER 10 MG: Performed by: INTERNAL MEDICINE

## 2021-04-26 PROCEDURE — 63710000001 INSULIN ASPART PER 5 UNITS: Performed by: INTERNAL MEDICINE

## 2021-04-26 PROCEDURE — 97166 OT EVAL MOD COMPLEX 45 MIN: CPT

## 2021-04-26 RX ORDER — POTASSIUM CHLORIDE 750 MG/1
40 CAPSULE, EXTENDED RELEASE ORAL ONCE
Status: COMPLETED | OUTPATIENT
Start: 2021-04-26 | End: 2021-04-26

## 2021-04-26 RX ADMIN — HYDRALAZINE HYDROCHLORIDE 10 MG: 20 INJECTION INTRAMUSCULAR; INTRAVENOUS at 15:53

## 2021-04-26 RX ADMIN — PIPERACILLIN SODIUM AND TAZOBACTAM SODIUM 3.38 G: 3; .375 INJECTION, POWDER, LYOPHILIZED, FOR SOLUTION INTRAVENOUS at 08:30

## 2021-04-26 RX ADMIN — ENOXAPARIN SODIUM 30 MG: 30 INJECTION SUBCUTANEOUS at 08:18

## 2021-04-26 RX ADMIN — INSULIN DETEMIR 28 UNITS: 100 INJECTION, SOLUTION SUBCUTANEOUS at 21:21

## 2021-04-26 RX ADMIN — HYDRALAZINE HYDROCHLORIDE 10 MG: 20 INJECTION INTRAMUSCULAR; INTRAVENOUS at 04:09

## 2021-04-26 RX ADMIN — POTASSIUM CHLORIDE 40 MEQ: 750 CAPSULE, EXTENDED RELEASE ORAL at 08:19

## 2021-04-26 RX ADMIN — ACETAMINOPHEN 650 MG: 325 TABLET, FILM COATED ORAL at 04:09

## 2021-04-26 RX ADMIN — CLOPIDOGREL BISULFATE 75 MG: 75 TABLET ORAL at 08:19

## 2021-04-26 RX ADMIN — METOPROLOL SUCCINATE 100 MG: 50 TABLET, EXTENDED RELEASE ORAL at 21:26

## 2021-04-26 RX ADMIN — SODIUM CHLORIDE 50 ML/HR: 4.5 INJECTION, SOLUTION INTRAVENOUS at 02:22

## 2021-04-26 RX ADMIN — FLUTICASONE PROPIONATE 2 SPRAY: 50 SPRAY, METERED NASAL at 21:27

## 2021-04-26 RX ADMIN — SODIUM CHLORIDE, PRESERVATIVE FREE 10 ML: 5 INJECTION INTRAVENOUS at 08:21

## 2021-04-26 RX ADMIN — INSULIN ASPART 2 UNITS: 100 INJECTION, SOLUTION INTRAVENOUS; SUBCUTANEOUS at 16:30

## 2021-04-26 RX ADMIN — NYSTATIN 1 APPLICATION: 100000 OINTMENT TOPICAL at 21:27

## 2021-04-26 RX ADMIN — AMLODIPINE BESYLATE 10 MG: 10 TABLET ORAL at 21:25

## 2021-04-26 RX ADMIN — PRAVASTATIN SODIUM 20 MG: 20 TABLET ORAL at 08:19

## 2021-04-26 RX ADMIN — NYSTATIN 1 APPLICATION: 100000 OINTMENT TOPICAL at 08:20

## 2021-04-26 RX ADMIN — PIPERACILLIN SODIUM AND TAZOBACTAM SODIUM 3.38 G: 3; .375 INJECTION, POWDER, LYOPHILIZED, FOR SOLUTION INTRAVENOUS at 16:30

## 2021-04-26 RX ADMIN — FLUTICASONE PROPIONATE 2 SPRAY: 50 SPRAY, METERED NASAL at 08:20

## 2021-04-26 RX ADMIN — FAMOTIDINE 40 MG: 40 TABLET, FILM COATED ORAL at 08:19

## 2021-04-26 RX ADMIN — ONDANSETRON 4 MG: 2 INJECTION INTRAMUSCULAR; INTRAVENOUS at 16:30

## 2021-04-26 RX ADMIN — CARBAMAZEPINE 200 MG: 200 TABLET ORAL at 21:25

## 2021-04-26 RX ADMIN — ASPIRIN 81 MG: 81 TABLET, COATED ORAL at 08:19

## 2021-04-26 RX ADMIN — NYSTATIN: 100000 POWDER TOPICAL at 08:20

## 2021-04-26 RX ADMIN — CARBAMAZEPINE 200 MG: 200 TABLET ORAL at 08:19

## 2021-04-26 RX ADMIN — SODIUM CHLORIDE 50 ML/HR: 4.5 INJECTION, SOLUTION INTRAVENOUS at 21:26

## 2021-04-26 RX ADMIN — LISINOPRIL 40 MG: 40 TABLET ORAL at 08:19

## 2021-04-26 RX ADMIN — PIPERACILLIN SODIUM AND TAZOBACTAM SODIUM 3.38 G: 3; .375 INJECTION, POWDER, LYOPHILIZED, FOR SOLUTION INTRAVENOUS at 00:43

## 2021-04-26 RX ADMIN — NYSTATIN: 100000 POWDER TOPICAL at 21:27

## 2021-04-27 ENCOUNTER — APPOINTMENT (OUTPATIENT)
Dept: ULTRASOUND IMAGING | Facility: HOSPITAL | Age: 81
End: 2021-04-27

## 2021-04-27 ENCOUNTER — APPOINTMENT (OUTPATIENT)
Dept: INTERVENTIONAL RADIOLOGY/VASCULAR | Facility: HOSPITAL | Age: 81
End: 2021-04-27

## 2021-04-27 LAB
ANION GAP SERPL CALCULATED.3IONS-SCNC: 8 MMOL/L (ref 5–15)
BUN SERPL-MCNC: 14 MG/DL (ref 8–23)
BUN/CREAT SERPL: 13 (ref 7–25)
CALCIUM SPEC-SCNC: 8.5 MG/DL (ref 8.6–10.5)
CHLORIDE SERPL-SCNC: 102 MMOL/L (ref 98–107)
CO2 SERPL-SCNC: 28 MMOL/L (ref 22–29)
CREAT SERPL-MCNC: 1.08 MG/DL (ref 0.57–1)
DEPRECATED RDW RBC AUTO: 42.3 FL (ref 37–54)
ERYTHROCYTE [DISTWIDTH] IN BLOOD BY AUTOMATED COUNT: 12.7 % (ref 12.3–15.4)
GFR SERPL CREATININE-BSD FRML MDRD: 49 ML/MIN/1.73
GLUCOSE BLDC GLUCOMTR-MCNC: 123 MG/DL (ref 70–130)
GLUCOSE BLDC GLUCOMTR-MCNC: 153 MG/DL (ref 70–130)
GLUCOSE BLDC GLUCOMTR-MCNC: 156 MG/DL (ref 70–130)
GLUCOSE BLDC GLUCOMTR-MCNC: 164 MG/DL (ref 70–130)
GLUCOSE BLDC GLUCOMTR-MCNC: 188 MG/DL (ref 70–130)
GLUCOSE BLDC GLUCOMTR-MCNC: 57 MG/DL (ref 70–130)
GLUCOSE BLDC GLUCOMTR-MCNC: 65 MG/DL (ref 70–130)
GLUCOSE SERPL-MCNC: 57 MG/DL (ref 65–99)
HCT VFR BLD AUTO: 38.9 % (ref 34–46.6)
HGB BLD-MCNC: 12.9 G/DL (ref 12–15.9)
MCH RBC QN AUTO: 30.1 PG (ref 26.6–33)
MCHC RBC AUTO-ENTMCNC: 33.2 G/DL (ref 31.5–35.7)
MCV RBC AUTO: 90.9 FL (ref 79–97)
PLATELET # BLD AUTO: 234 10*3/MM3 (ref 140–450)
PMV BLD AUTO: 9 FL (ref 6–12)
POTASSIUM SERPL-SCNC: 2.9 MMOL/L (ref 3.5–5.2)
POTASSIUM SERPL-SCNC: 4.4 MMOL/L (ref 3.5–5.2)
RBC # BLD AUTO: 4.28 10*6/MM3 (ref 3.77–5.28)
SODIUM SERPL-SCNC: 138 MMOL/L (ref 136–145)
WBC # BLD AUTO: 6.82 10*3/MM3 (ref 3.4–10.8)

## 2021-04-27 PROCEDURE — 97530 THERAPEUTIC ACTIVITIES: CPT

## 2021-04-27 PROCEDURE — 85027 COMPLETE CBC AUTOMATED: CPT | Performed by: NURSE PRACTITIONER

## 2021-04-27 PROCEDURE — 97116 GAIT TRAINING THERAPY: CPT

## 2021-04-27 PROCEDURE — 84132 ASSAY OF SERUM POTASSIUM: CPT | Performed by: NURSE PRACTITIONER

## 2021-04-27 PROCEDURE — C1751 CATH, INF, PER/CENT/MIDLINE: HCPCS

## 2021-04-27 PROCEDURE — 25010000002 PIPERACILLIN SOD-TAZOBACTAM PER 1 G: Performed by: NURSE PRACTITIONER

## 2021-04-27 PROCEDURE — 25010000002 ENOXAPARIN PER 10 MG: Performed by: INTERNAL MEDICINE

## 2021-04-27 PROCEDURE — 80048 BASIC METABOLIC PNL TOTAL CA: CPT | Performed by: NURSE PRACTITIONER

## 2021-04-27 PROCEDURE — 82962 GLUCOSE BLOOD TEST: CPT

## 2021-04-27 PROCEDURE — 36410 VNPNXR 3YR/> PHY/QHP DX/THER: CPT

## 2021-04-27 PROCEDURE — 76937 US GUIDE VASCULAR ACCESS: CPT

## 2021-04-27 PROCEDURE — 25010000002 HYDRALAZINE PER 20 MG: Performed by: INTERNAL MEDICINE

## 2021-04-27 RX ORDER — POTASSIUM CHLORIDE 1.5 G/1.77G
40 POWDER, FOR SOLUTION ORAL AS NEEDED
Status: DISCONTINUED | OUTPATIENT
Start: 2021-04-27 | End: 2021-04-29 | Stop reason: HOSPADM

## 2021-04-27 RX ORDER — MAGNESIUM SULFATE HEPTAHYDRATE 40 MG/ML
2 INJECTION, SOLUTION INTRAVENOUS AS NEEDED
Status: DISCONTINUED | OUTPATIENT
Start: 2021-04-27 | End: 2021-04-29 | Stop reason: HOSPADM

## 2021-04-27 RX ORDER — MAGNESIUM SULFATE HEPTAHYDRATE 40 MG/ML
4 INJECTION, SOLUTION INTRAVENOUS AS NEEDED
Status: DISCONTINUED | OUTPATIENT
Start: 2021-04-27 | End: 2021-04-29 | Stop reason: HOSPADM

## 2021-04-27 RX ORDER — POTASSIUM CHLORIDE 750 MG/1
40 CAPSULE, EXTENDED RELEASE ORAL AS NEEDED
Status: DISCONTINUED | OUTPATIENT
Start: 2021-04-27 | End: 2021-04-29 | Stop reason: HOSPADM

## 2021-04-27 RX ADMIN — PIPERACILLIN SODIUM AND TAZOBACTAM SODIUM 3.38 G: 3; .375 INJECTION, POWDER, LYOPHILIZED, FOR SOLUTION INTRAVENOUS at 16:44

## 2021-04-27 RX ADMIN — PIPERACILLIN SODIUM AND TAZOBACTAM SODIUM 3.38 G: 3; .375 INJECTION, POWDER, LYOPHILIZED, FOR SOLUTION INTRAVENOUS at 08:38

## 2021-04-27 RX ADMIN — FLUTICASONE PROPIONATE 2 SPRAY: 50 SPRAY, METERED NASAL at 08:15

## 2021-04-27 RX ADMIN — CARBAMAZEPINE 200 MG: 200 TABLET ORAL at 20:21

## 2021-04-27 RX ADMIN — FLUTICASONE PROPIONATE 2 SPRAY: 50 SPRAY, METERED NASAL at 21:49

## 2021-04-27 RX ADMIN — AMLODIPINE BESYLATE 10 MG: 10 TABLET ORAL at 20:21

## 2021-04-27 RX ADMIN — SODIUM CHLORIDE, PRESERVATIVE FREE 10 ML: 5 INJECTION INTRAVENOUS at 20:21

## 2021-04-27 RX ADMIN — LISINOPRIL 40 MG: 40 TABLET ORAL at 08:15

## 2021-04-27 RX ADMIN — DEXTROSE MONOHYDRATE 25 G: 500 INJECTION PARENTERAL at 06:45

## 2021-04-27 RX ADMIN — PIPERACILLIN SODIUM AND TAZOBACTAM SODIUM 3.38 G: 3; .375 INJECTION, POWDER, LYOPHILIZED, FOR SOLUTION INTRAVENOUS at 00:50

## 2021-04-27 RX ADMIN — NYSTATIN 1 APPLICATION: 100000 OINTMENT TOPICAL at 21:49

## 2021-04-27 RX ADMIN — ASPIRIN 81 MG: 81 TABLET, COATED ORAL at 08:15

## 2021-04-27 RX ADMIN — SODIUM CHLORIDE, PRESERVATIVE FREE 10 ML: 5 INJECTION INTRAVENOUS at 08:15

## 2021-04-27 RX ADMIN — HYDRALAZINE HYDROCHLORIDE 10 MG: 20 INJECTION INTRAMUSCULAR; INTRAVENOUS at 03:52

## 2021-04-27 RX ADMIN — POTASSIUM CHLORIDE 40 MEQ: 750 CAPSULE, EXTENDED RELEASE ORAL at 09:41

## 2021-04-27 RX ADMIN — POTASSIUM CHLORIDE 40 MEQ: 750 CAPSULE, EXTENDED RELEASE ORAL at 13:10

## 2021-04-27 RX ADMIN — CLOPIDOGREL BISULFATE 75 MG: 75 TABLET ORAL at 08:15

## 2021-04-27 RX ADMIN — METOPROLOL SUCCINATE 100 MG: 50 TABLET, EXTENDED RELEASE ORAL at 20:21

## 2021-04-27 RX ADMIN — SODIUM CHLORIDE 50 ML/HR: 4.5 INJECTION, SOLUTION INTRAVENOUS at 17:33

## 2021-04-27 RX ADMIN — NYSTATIN: 100000 POWDER TOPICAL at 08:15

## 2021-04-27 RX ADMIN — NYSTATIN: 100000 POWDER TOPICAL at 21:49

## 2021-04-27 RX ADMIN — CARBAMAZEPINE 200 MG: 200 TABLET ORAL at 08:15

## 2021-04-27 RX ADMIN — HYDRALAZINE HYDROCHLORIDE 10 MG: 20 INJECTION INTRAMUSCULAR; INTRAVENOUS at 15:44

## 2021-04-27 RX ADMIN — POTASSIUM CHLORIDE 40 MEQ: 750 CAPSULE, EXTENDED RELEASE ORAL at 16:44

## 2021-04-27 RX ADMIN — NYSTATIN 1 APPLICATION: 100000 OINTMENT TOPICAL at 08:15

## 2021-04-27 RX ADMIN — FAMOTIDINE 40 MG: 40 TABLET, FILM COATED ORAL at 08:15

## 2021-04-28 ENCOUNTER — APPOINTMENT (OUTPATIENT)
Dept: INTERVENTIONAL RADIOLOGY/VASCULAR | Facility: HOSPITAL | Age: 81
End: 2021-04-28

## 2021-04-28 ENCOUNTER — APPOINTMENT (OUTPATIENT)
Dept: ULTRASOUND IMAGING | Facility: HOSPITAL | Age: 81
End: 2021-04-28

## 2021-04-28 LAB
ANION GAP SERPL CALCULATED.3IONS-SCNC: 10 MMOL/L (ref 5–15)
BACTERIA SPEC AEROBE CULT: NORMAL
BUN SERPL-MCNC: 13 MG/DL (ref 8–23)
BUN/CREAT SERPL: 13.4 (ref 7–25)
CALCIUM SPEC-SCNC: 8.8 MG/DL (ref 8.6–10.5)
CHLORIDE SERPL-SCNC: 102 MMOL/L (ref 98–107)
CO2 SERPL-SCNC: 24 MMOL/L (ref 22–29)
CREAT SERPL-MCNC: 0.97 MG/DL (ref 0.57–1)
DEPRECATED RDW RBC AUTO: 44.1 FL (ref 37–54)
ERYTHROCYTE [DISTWIDTH] IN BLOOD BY AUTOMATED COUNT: 13.1 % (ref 12.3–15.4)
GFR SERPL CREATININE-BSD FRML MDRD: 55 ML/MIN/1.73
GLUCOSE BLDC GLUCOMTR-MCNC: 125 MG/DL (ref 70–130)
GLUCOSE BLDC GLUCOMTR-MCNC: 153 MG/DL (ref 70–130)
GLUCOSE BLDC GLUCOMTR-MCNC: 176 MG/DL (ref 70–130)
GLUCOSE BLDC GLUCOMTR-MCNC: 212 MG/DL (ref 70–130)
GLUCOSE SERPL-MCNC: 138 MG/DL (ref 65–99)
HCT VFR BLD AUTO: 39.6 % (ref 34–46.6)
HGB BLD-MCNC: 13.2 G/DL (ref 12–15.9)
MCH RBC QN AUTO: 30.7 PG (ref 26.6–33)
MCHC RBC AUTO-ENTMCNC: 33.3 G/DL (ref 31.5–35.7)
MCV RBC AUTO: 92.1 FL (ref 79–97)
PLATELET # BLD AUTO: 220 10*3/MM3 (ref 140–450)
PMV BLD AUTO: 8.9 FL (ref 6–12)
POTASSIUM SERPL-SCNC: 4.5 MMOL/L (ref 3.5–5.2)
RBC # BLD AUTO: 4.3 10*6/MM3 (ref 3.77–5.28)
SODIUM SERPL-SCNC: 136 MMOL/L (ref 136–145)
WBC # BLD AUTO: 8.42 10*3/MM3 (ref 3.4–10.8)

## 2021-04-28 PROCEDURE — 76937 US GUIDE VASCULAR ACCESS: CPT

## 2021-04-28 PROCEDURE — 25010000002 HYDRALAZINE PER 20 MG: Performed by: INTERNAL MEDICINE

## 2021-04-28 PROCEDURE — 63710000001 INSULIN ASPART PER 5 UNITS: Performed by: INTERNAL MEDICINE

## 2021-04-28 PROCEDURE — 36410 VNPNXR 3YR/> PHY/QHP DX/THER: CPT

## 2021-04-28 PROCEDURE — 97110 THERAPEUTIC EXERCISES: CPT

## 2021-04-28 PROCEDURE — 25010000002 PIPERACILLIN SOD-TAZOBACTAM PER 1 G: Performed by: NURSE PRACTITIONER

## 2021-04-28 PROCEDURE — 85027 COMPLETE CBC AUTOMATED: CPT | Performed by: NURSE PRACTITIONER

## 2021-04-28 PROCEDURE — C1751 CATH, INF, PER/CENT/MIDLINE: HCPCS

## 2021-04-28 PROCEDURE — 97530 THERAPEUTIC ACTIVITIES: CPT

## 2021-04-28 PROCEDURE — 25010000002 ONDANSETRON PER 1 MG: Performed by: INTERNAL MEDICINE

## 2021-04-28 PROCEDURE — 82962 GLUCOSE BLOOD TEST: CPT

## 2021-04-28 PROCEDURE — 25010000002 ENOXAPARIN PER 10 MG: Performed by: INTERNAL MEDICINE

## 2021-04-28 PROCEDURE — 80048 BASIC METABOLIC PNL TOTAL CA: CPT | Performed by: NURSE PRACTITIONER

## 2021-04-28 PROCEDURE — 97535 SELF CARE MNGMENT TRAINING: CPT

## 2021-04-28 RX ORDER — VENLAFAXINE 37.5 MG/1
37.5 TABLET ORAL 2 TIMES DAILY WITH MEALS
Status: DISCONTINUED | OUTPATIENT
Start: 2021-04-28 | End: 2021-04-29 | Stop reason: HOSPADM

## 2021-04-28 RX ORDER — CLONIDINE HYDROCHLORIDE 0.1 MG/1
0.1 TABLET ORAL ONCE
Status: COMPLETED | OUTPATIENT
Start: 2021-04-28 | End: 2021-04-28

## 2021-04-28 RX ADMIN — NYSTATIN 1 APPLICATION: 100000 OINTMENT TOPICAL at 09:39

## 2021-04-28 RX ADMIN — SODIUM CHLORIDE, PRESERVATIVE FREE 10 ML: 5 INJECTION INTRAVENOUS at 09:39

## 2021-04-28 RX ADMIN — FLUTICASONE PROPIONATE 2 SPRAY: 50 SPRAY, METERED NASAL at 19:51

## 2021-04-28 RX ADMIN — CARBAMAZEPINE 200 MG: 200 TABLET ORAL at 09:33

## 2021-04-28 RX ADMIN — HYDRALAZINE HYDROCHLORIDE 10 MG: 20 INJECTION INTRAMUSCULAR; INTRAVENOUS at 13:44

## 2021-04-28 RX ADMIN — ENOXAPARIN SODIUM 30 MG: 30 INJECTION SUBCUTANEOUS at 09:32

## 2021-04-28 RX ADMIN — VENLAFAXINE 37.5 MG: 37.5 TABLET ORAL at 17:26

## 2021-04-28 RX ADMIN — AMLODIPINE BESYLATE 10 MG: 10 TABLET ORAL at 19:50

## 2021-04-28 RX ADMIN — ONDANSETRON 4 MG: 2 INJECTION INTRAMUSCULAR; INTRAVENOUS at 09:38

## 2021-04-28 RX ADMIN — PIPERACILLIN SODIUM AND TAZOBACTAM SODIUM 3.38 G: 3; .375 INJECTION, POWDER, LYOPHILIZED, FOR SOLUTION INTRAVENOUS at 09:32

## 2021-04-28 RX ADMIN — INSULIN ASPART 2 UNITS: 100 INJECTION, SOLUTION INTRAVENOUS; SUBCUTANEOUS at 17:26

## 2021-04-28 RX ADMIN — CARBAMAZEPINE 200 MG: 200 TABLET ORAL at 19:50

## 2021-04-28 RX ADMIN — PRAVASTATIN SODIUM 20 MG: 20 TABLET ORAL at 09:33

## 2021-04-28 RX ADMIN — CLONIDINE HYDROCHLORIDE 0.1 MG: 0.1 TABLET ORAL at 04:17

## 2021-04-28 RX ADMIN — NYSTATIN 1 APPLICATION: 100000 OINTMENT TOPICAL at 19:51

## 2021-04-28 RX ADMIN — FAMOTIDINE 40 MG: 40 TABLET, FILM COATED ORAL at 09:37

## 2021-04-28 RX ADMIN — NYSTATIN: 100000 POWDER TOPICAL at 19:51

## 2021-04-28 RX ADMIN — SODIUM CHLORIDE, PRESERVATIVE FREE 10 ML: 5 INJECTION INTRAVENOUS at 19:50

## 2021-04-28 RX ADMIN — FLUTICASONE PROPIONATE 2 SPRAY: 50 SPRAY, METERED NASAL at 09:39

## 2021-04-28 RX ADMIN — PIPERACILLIN SODIUM AND TAZOBACTAM SODIUM 3.38 G: 3; .375 INJECTION, POWDER, LYOPHILIZED, FOR SOLUTION INTRAVENOUS at 22:38

## 2021-04-28 RX ADMIN — CLOPIDOGREL BISULFATE 75 MG: 75 TABLET ORAL at 09:33

## 2021-04-28 RX ADMIN — METOPROLOL SUCCINATE 100 MG: 50 TABLET, EXTENDED RELEASE ORAL at 19:50

## 2021-04-28 RX ADMIN — LISINOPRIL 40 MG: 40 TABLET ORAL at 05:45

## 2021-04-28 RX ADMIN — NYSTATIN: 100000 POWDER TOPICAL at 09:39

## 2021-04-28 RX ADMIN — ASPIRIN 81 MG: 81 TABLET, COATED ORAL at 09:33

## 2021-04-29 VITALS
WEIGHT: 185 LBS | RESPIRATION RATE: 18 BRPM | SYSTOLIC BLOOD PRESSURE: 150 MMHG | HEART RATE: 57 BPM | TEMPERATURE: 97.5 F | HEIGHT: 62 IN | BODY MASS INDEX: 34.04 KG/M2 | DIASTOLIC BLOOD PRESSURE: 76 MMHG | OXYGEN SATURATION: 97 %

## 2021-04-29 PROBLEM — K52.9 COLITIS: Status: ACTIVE | Noted: 2021-04-29

## 2021-04-29 LAB
ANION GAP SERPL CALCULATED.3IONS-SCNC: 9 MMOL/L (ref 5–15)
BACTERIA SPEC AEROBE CULT: NORMAL
BUN SERPL-MCNC: 15 MG/DL (ref 8–23)
BUN/CREAT SERPL: 14.9 (ref 7–25)
CALCIUM SPEC-SCNC: 9.1 MG/DL (ref 8.6–10.5)
CHLORIDE SERPL-SCNC: 102 MMOL/L (ref 98–107)
CO2 SERPL-SCNC: 26 MMOL/L (ref 22–29)
CREAT SERPL-MCNC: 1.01 MG/DL (ref 0.57–1)
DEPRECATED RDW RBC AUTO: 43 FL (ref 37–54)
ERYTHROCYTE [DISTWIDTH] IN BLOOD BY AUTOMATED COUNT: 12.8 % (ref 12.3–15.4)
GFR SERPL CREATININE-BSD FRML MDRD: 53 ML/MIN/1.73
GLUCOSE BLDC GLUCOMTR-MCNC: 124 MG/DL (ref 70–130)
GLUCOSE BLDC GLUCOMTR-MCNC: 194 MG/DL (ref 70–130)
GLUCOSE BLDC GLUCOMTR-MCNC: 199 MG/DL (ref 70–130)
GLUCOSE SERPL-MCNC: 127 MG/DL (ref 65–99)
HCT VFR BLD AUTO: 37.6 % (ref 34–46.6)
HGB BLD-MCNC: 12.5 G/DL (ref 12–15.9)
MCH RBC QN AUTO: 30.4 PG (ref 26.6–33)
MCHC RBC AUTO-ENTMCNC: 33.2 G/DL (ref 31.5–35.7)
MCV RBC AUTO: 91.5 FL (ref 79–97)
PLATELET # BLD AUTO: 243 10*3/MM3 (ref 140–450)
PMV BLD AUTO: 8.8 FL (ref 6–12)
POTASSIUM SERPL-SCNC: 4 MMOL/L (ref 3.5–5.2)
RBC # BLD AUTO: 4.11 10*6/MM3 (ref 3.77–5.28)
SARS-COV-2 N GENE RESP QL NAA+PROBE: NOT DETECTED
SODIUM SERPL-SCNC: 137 MMOL/L (ref 136–145)
WBC # BLD AUTO: 7.33 10*3/MM3 (ref 3.4–10.8)

## 2021-04-29 PROCEDURE — 87635 SARS-COV-2 COVID-19 AMP PRB: CPT | Performed by: INTERNAL MEDICINE

## 2021-04-29 PROCEDURE — 85027 COMPLETE CBC AUTOMATED: CPT | Performed by: NURSE PRACTITIONER

## 2021-04-29 PROCEDURE — 25010000002 ENOXAPARIN PER 10 MG: Performed by: INTERNAL MEDICINE

## 2021-04-29 PROCEDURE — 25010000002 PIPERACILLIN SOD-TAZOBACTAM PER 1 G: Performed by: NURSE PRACTITIONER

## 2021-04-29 PROCEDURE — 82962 GLUCOSE BLOOD TEST: CPT

## 2021-04-29 PROCEDURE — 63710000001 INSULIN ASPART PER 5 UNITS: Performed by: INTERNAL MEDICINE

## 2021-04-29 PROCEDURE — 80048 BASIC METABOLIC PNL TOTAL CA: CPT | Performed by: NURSE PRACTITIONER

## 2021-04-29 PROCEDURE — 97110 THERAPEUTIC EXERCISES: CPT

## 2021-04-29 PROCEDURE — 97530 THERAPEUTIC ACTIVITIES: CPT

## 2021-04-29 RX ORDER — AMOXICILLIN AND CLAVULANATE POTASSIUM 875; 125 MG/1; MG/1
1 TABLET, FILM COATED ORAL 2 TIMES DAILY
Qty: 12 TABLET | Refills: 0 | Status: SHIPPED | OUTPATIENT
Start: 2021-04-29 | End: 2021-05-05

## 2021-04-29 RX ORDER — ONDANSETRON 4 MG/1
4 TABLET, ORALLY DISINTEGRATING ORAL EVERY 8 HOURS PRN
Qty: 30 TABLET | Refills: 0 | Status: SHIPPED | OUTPATIENT
Start: 2021-04-29 | End: 2022-09-20 | Stop reason: HOSPADM

## 2021-04-29 RX ADMIN — POTASSIUM CHLORIDE 40 MEQ: 750 CAPSULE, EXTENDED RELEASE ORAL at 08:31

## 2021-04-29 RX ADMIN — SODIUM CHLORIDE 50 ML/HR: 4.5 INJECTION, SOLUTION INTRAVENOUS at 05:13

## 2021-04-29 RX ADMIN — FLUTICASONE PROPIONATE 2 SPRAY: 50 SPRAY, METERED NASAL at 08:31

## 2021-04-29 RX ADMIN — LISINOPRIL 40 MG: 40 TABLET ORAL at 08:31

## 2021-04-29 RX ADMIN — SODIUM CHLORIDE, PRESERVATIVE FREE 10 ML: 5 INJECTION INTRAVENOUS at 08:33

## 2021-04-29 RX ADMIN — CARBAMAZEPINE 200 MG: 200 TABLET ORAL at 08:31

## 2021-04-29 RX ADMIN — ASPIRIN 81 MG: 81 TABLET, COATED ORAL at 08:31

## 2021-04-29 RX ADMIN — PIPERACILLIN SODIUM AND TAZOBACTAM SODIUM 3.38 G: 3; .375 INJECTION, POWDER, LYOPHILIZED, FOR SOLUTION INTRAVENOUS at 05:03

## 2021-04-29 RX ADMIN — NYSTATIN 1 APPLICATION: 100000 OINTMENT TOPICAL at 08:31

## 2021-04-29 RX ADMIN — NYSTATIN: 100000 POWDER TOPICAL at 08:31

## 2021-04-29 RX ADMIN — FAMOTIDINE 40 MG: 40 TABLET, FILM COATED ORAL at 08:31

## 2021-04-29 RX ADMIN — VENLAFAXINE 37.5 MG: 37.5 TABLET ORAL at 08:31

## 2021-04-29 RX ADMIN — ENOXAPARIN SODIUM 30 MG: 30 INJECTION SUBCUTANEOUS at 08:32

## 2021-04-29 RX ADMIN — CLOPIDOGREL BISULFATE 75 MG: 75 TABLET ORAL at 08:31

## 2021-04-29 RX ADMIN — INSULIN ASPART 2 UNITS: 100 INJECTION, SOLUTION INTRAVENOUS; SUBCUTANEOUS at 11:21

## 2021-04-29 RX ADMIN — VENLAFAXINE 37.5 MG: 37.5 TABLET ORAL at 17:07

## 2021-04-29 RX ADMIN — INSULIN ASPART 2 UNITS: 100 INJECTION, SOLUTION INTRAVENOUS; SUBCUTANEOUS at 17:07

## 2021-06-17 ENCOUNTER — LAB REQUISITION (OUTPATIENT)
Dept: LAB | Facility: HOSPITAL | Age: 81
End: 2021-06-17

## 2021-06-17 DIAGNOSIS — R53.1 WEAKNESS: ICD-10-CM

## 2021-06-17 DIAGNOSIS — N39.0 URINARY TRACT INFECTION, SITE NOT SPECIFIED: ICD-10-CM

## 2021-06-17 LAB
ALBUMIN SERPL-MCNC: 3.3 G/DL (ref 3.5–5.2)
ALBUMIN/GLOB SERPL: 1 G/DL
ALP SERPL-CCNC: 161 U/L (ref 39–117)
ALT SERPL W P-5'-P-CCNC: 10 U/L (ref 1–33)
ANION GAP SERPL CALCULATED.3IONS-SCNC: 8 MMOL/L (ref 5–15)
AST SERPL-CCNC: 18 U/L (ref 1–32)
BACTERIA UR QL AUTO: ABNORMAL /HPF
BASOPHILS # BLD AUTO: 0.07 10*3/MM3 (ref 0–0.2)
BASOPHILS NFR BLD AUTO: 0.6 % (ref 0–1.5)
BILIRUB SERPL-MCNC: <0.2 MG/DL (ref 0–1.2)
BILIRUB UR QL STRIP: NEGATIVE
BUN SERPL-MCNC: 25 MG/DL (ref 8–23)
BUN/CREAT SERPL: 20.2 (ref 7–25)
CALCIUM SPEC-SCNC: 9.2 MG/DL (ref 8.6–10.5)
CHLORIDE SERPL-SCNC: 101 MMOL/L (ref 98–107)
CLARITY UR: ABNORMAL
CO2 SERPL-SCNC: 29 MMOL/L (ref 22–29)
COLOR UR: YELLOW
CREAT SERPL-MCNC: 1.24 MG/DL (ref 0.57–1)
DEPRECATED RDW RBC AUTO: 42.7 FL (ref 37–54)
EOSINOPHIL # BLD AUTO: 0.18 10*3/MM3 (ref 0–0.4)
EOSINOPHIL NFR BLD AUTO: 1.4 % (ref 0.3–6.2)
ERYTHROCYTE [DISTWIDTH] IN BLOOD BY AUTOMATED COUNT: 13.1 % (ref 12.3–15.4)
GFR SERPL CREATININE-BSD FRML MDRD: 42 ML/MIN/1.73
GLOBULIN UR ELPH-MCNC: 3.2 GM/DL
GLUCOSE SERPL-MCNC: 209 MG/DL (ref 65–99)
GLUCOSE UR STRIP-MCNC: NEGATIVE MG/DL
HCT VFR BLD AUTO: 35.1 % (ref 34–46.6)
HGB BLD-MCNC: 11.5 G/DL (ref 12–15.9)
HGB UR QL STRIP.AUTO: NEGATIVE
HYALINE CASTS UR QL AUTO: ABNORMAL /LPF
IMM GRANULOCYTES # BLD AUTO: 0.06 10*3/MM3 (ref 0–0.05)
IMM GRANULOCYTES NFR BLD AUTO: 0.5 % (ref 0–0.5)
KETONES UR QL STRIP: NEGATIVE
LEUKOCYTE ESTERASE UR QL STRIP.AUTO: ABNORMAL
LYMPHOCYTES # BLD AUTO: 2.46 10*3/MM3 (ref 0.7–3.1)
LYMPHOCYTES NFR BLD AUTO: 19.5 % (ref 19.6–45.3)
MCH RBC QN AUTO: 29.7 PG (ref 26.6–33)
MCHC RBC AUTO-ENTMCNC: 32.8 G/DL (ref 31.5–35.7)
MCV RBC AUTO: 90.7 FL (ref 79–97)
MONOCYTES # BLD AUTO: 0.76 10*3/MM3 (ref 0.1–0.9)
MONOCYTES NFR BLD AUTO: 6 % (ref 5–12)
NEUTROPHILS NFR BLD AUTO: 72 % (ref 42.7–76)
NEUTROPHILS NFR BLD AUTO: 9.11 10*3/MM3 (ref 1.7–7)
NITRITE UR QL STRIP: NEGATIVE
NRBC BLD AUTO-RTO: 0 /100 WBC (ref 0–0.2)
PH UR STRIP.AUTO: 6.5 [PH] (ref 5–9)
PLATELET # BLD AUTO: 291 10*3/MM3 (ref 140–450)
PMV BLD AUTO: 9.1 FL (ref 6–12)
POTASSIUM SERPL-SCNC: 4.1 MMOL/L (ref 3.5–5.2)
PROT SERPL-MCNC: 6.5 G/DL (ref 6–8.5)
PROT UR QL STRIP: ABNORMAL
RBC # BLD AUTO: 3.87 10*6/MM3 (ref 3.77–5.28)
RBC # UR: ABNORMAL /HPF
REF LAB TEST METHOD: ABNORMAL
SODIUM SERPL-SCNC: 138 MMOL/L (ref 136–145)
SP GR UR STRIP: 1.01 (ref 1–1.03)
SQUAMOUS #/AREA URNS HPF: ABNORMAL /HPF
UROBILINOGEN UR QL STRIP: ABNORMAL
WBC # BLD AUTO: 12.64 10*3/MM3 (ref 3.4–10.8)
WBC UR QL AUTO: ABNORMAL /HPF

## 2021-06-17 PROCEDURE — 87086 URINE CULTURE/COLONY COUNT: CPT | Performed by: INTERNAL MEDICINE

## 2021-06-17 PROCEDURE — 87186 SC STD MICRODIL/AGAR DIL: CPT | Performed by: INTERNAL MEDICINE

## 2021-06-17 PROCEDURE — 80053 COMPREHEN METABOLIC PANEL: CPT | Performed by: INTERNAL MEDICINE

## 2021-06-17 PROCEDURE — 85025 COMPLETE CBC W/AUTO DIFF WBC: CPT | Performed by: INTERNAL MEDICINE

## 2021-06-17 PROCEDURE — 87077 CULTURE AEROBIC IDENTIFY: CPT | Performed by: INTERNAL MEDICINE

## 2021-06-17 PROCEDURE — 81001 URINALYSIS AUTO W/SCOPE: CPT | Performed by: INTERNAL MEDICINE

## 2021-06-19 LAB — BACTERIA SPEC AEROBE CULT: ABNORMAL

## 2022-02-27 ENCOUNTER — HOSPITAL ENCOUNTER (EMERGENCY)
Facility: HOSPITAL | Age: 82
Discharge: HOME | End: 2022-02-27
Attending: FAMILY MEDICINE | Admitting: FAMILY MEDICINE

## 2022-02-27 ENCOUNTER — APPOINTMENT (OUTPATIENT)
Dept: CT IMAGING | Facility: HOSPITAL | Age: 82
End: 2022-02-27

## 2022-02-27 VITALS
BODY MASS INDEX: 34.53 KG/M2 | HEIGHT: 67 IN | SYSTOLIC BLOOD PRESSURE: 177 MMHG | RESPIRATION RATE: 16 BRPM | OXYGEN SATURATION: 95 % | HEART RATE: 62 BPM | TEMPERATURE: 97.5 F | DIASTOLIC BLOOD PRESSURE: 74 MMHG | WEIGHT: 220 LBS

## 2022-02-27 DIAGNOSIS — R51.9 ACUTE NONINTRACTABLE HEADACHE, UNSPECIFIED HEADACHE TYPE: ICD-10-CM

## 2022-02-27 DIAGNOSIS — U07.1 COVID: ICD-10-CM

## 2022-02-27 DIAGNOSIS — N39.0 URINARY TRACT INFECTION WITH HEMATURIA, SITE UNSPECIFIED: Primary | ICD-10-CM

## 2022-02-27 DIAGNOSIS — R31.9 URINARY TRACT INFECTION WITH HEMATURIA, SITE UNSPECIFIED: Primary | ICD-10-CM

## 2022-02-27 LAB
ALBUMIN SERPL-MCNC: 3.8 G/DL (ref 3.5–5.2)
ALBUMIN/GLOB SERPL: 1.3 G/DL
ALP SERPL-CCNC: 150 U/L (ref 39–117)
ALT SERPL W P-5'-P-CCNC: 10 U/L (ref 1–33)
ANION GAP SERPL CALCULATED.3IONS-SCNC: 12 MMOL/L (ref 5–15)
AST SERPL-CCNC: 16 U/L (ref 1–32)
BACTERIA UR QL AUTO: ABNORMAL /HPF
BASOPHILS # BLD AUTO: 0.07 10*3/MM3 (ref 0–0.2)
BASOPHILS NFR BLD AUTO: 0.9 % (ref 0–1.5)
BILIRUB SERPL-MCNC: <0.2 MG/DL (ref 0–1.2)
BILIRUB UR QL STRIP: NEGATIVE
BUN SERPL-MCNC: 26 MG/DL (ref 8–23)
BUN/CREAT SERPL: 21.3 (ref 7–25)
CALCIUM SPEC-SCNC: 9 MG/DL (ref 8.6–10.5)
CARBAMAZEPINE SERPL-MCNC: 8.2 MCG/ML (ref 4–12)
CHLORIDE SERPL-SCNC: 104 MMOL/L (ref 98–107)
CK SERPL-CCNC: 32 U/L (ref 20–180)
CLARITY UR: ABNORMAL
CO2 SERPL-SCNC: 26 MMOL/L (ref 22–29)
COLOR UR: YELLOW
CREAT SERPL-MCNC: 1.22 MG/DL (ref 0.57–1)
DEPRECATED RDW RBC AUTO: 42.4 FL (ref 37–54)
EOSINOPHIL # BLD AUTO: 0.14 10*3/MM3 (ref 0–0.4)
EOSINOPHIL NFR BLD AUTO: 1.8 % (ref 0.3–6.2)
ERYTHROCYTE [DISTWIDTH] IN BLOOD BY AUTOMATED COUNT: 13 % (ref 12.3–15.4)
FLUAV RNA RESP QL NAA+PROBE: NOT DETECTED
FLUBV RNA RESP QL NAA+PROBE: NOT DETECTED
GFR SERPL CREATININE-BSD FRML MDRD: 42 ML/MIN/1.73
GLOBULIN UR ELPH-MCNC: 3 GM/DL
GLUCOSE SERPL-MCNC: 146 MG/DL (ref 65–99)
GLUCOSE UR STRIP-MCNC: NEGATIVE MG/DL
HCT VFR BLD AUTO: 36.9 % (ref 34–46.6)
HGB BLD-MCNC: 12.3 G/DL (ref 12–15.9)
HGB UR QL STRIP.AUTO: NEGATIVE
HOLD SPECIMEN: NORMAL
HYALINE CASTS UR QL AUTO: ABNORMAL /LPF
IMM GRANULOCYTES # BLD AUTO: 0.05 10*3/MM3 (ref 0–0.05)
IMM GRANULOCYTES NFR BLD AUTO: 0.6 % (ref 0–0.5)
INR PPP: 1.06 (ref 0.8–1.2)
KETONES UR QL STRIP: NEGATIVE
LEUKOCYTE ESTERASE UR QL STRIP.AUTO: ABNORMAL
LYMPHOCYTES # BLD AUTO: 1.93 10*3/MM3 (ref 0.7–3.1)
LYMPHOCYTES NFR BLD AUTO: 24.5 % (ref 19.6–45.3)
MAGNESIUM SERPL-MCNC: 2.2 MG/DL (ref 1.6–2.4)
MCH RBC QN AUTO: 29.7 PG (ref 26.6–33)
MCHC RBC AUTO-ENTMCNC: 33.3 G/DL (ref 31.5–35.7)
MCV RBC AUTO: 89.1 FL (ref 79–97)
MONOCYTES # BLD AUTO: 0.54 10*3/MM3 (ref 0.1–0.9)
MONOCYTES NFR BLD AUTO: 6.8 % (ref 5–12)
NEUTROPHILS NFR BLD AUTO: 5.16 10*3/MM3 (ref 1.7–7)
NEUTROPHILS NFR BLD AUTO: 65.4 % (ref 42.7–76)
NITRITE UR QL STRIP: NEGATIVE
NRBC BLD AUTO-RTO: 0 /100 WBC (ref 0–0.2)
NT-PROBNP SERPL-MCNC: 918.9 PG/ML (ref 0–1800)
PH UR STRIP.AUTO: 6.5 [PH] (ref 5–9)
PLATELET # BLD AUTO: 245 10*3/MM3 (ref 140–450)
PMV BLD AUTO: 8.4 FL (ref 6–12)
POTASSIUM SERPL-SCNC: 4.1 MMOL/L (ref 3.5–5.2)
PROT SERPL-MCNC: 6.8 G/DL (ref 6–8.5)
PROT UR QL STRIP: ABNORMAL
PROTHROMBIN TIME: 13.7 SECONDS (ref 11.1–15.3)
RBC # BLD AUTO: 4.14 10*6/MM3 (ref 3.77–5.28)
RBC # UR STRIP: ABNORMAL /HPF
REF LAB TEST METHOD: ABNORMAL
SARS-COV-2 RNA RESP QL NAA+PROBE: DETECTED
SODIUM SERPL-SCNC: 142 MMOL/L (ref 136–145)
SP GR UR STRIP: 1.02 (ref 1–1.03)
SQUAMOUS #/AREA URNS HPF: ABNORMAL /HPF
TROPONIN T SERPL-MCNC: <0.01 NG/ML (ref 0–0.03)
UROBILINOGEN UR QL STRIP: ABNORMAL
WBC # UR STRIP: ABNORMAL /HPF
WBC NRBC COR # BLD: 7.89 10*3/MM3 (ref 3.4–10.8)

## 2022-02-27 PROCEDURE — 82550 ASSAY OF CK (CPK): CPT | Performed by: FAMILY MEDICINE

## 2022-02-27 PROCEDURE — 80156 ASSAY CARBAMAZEPINE TOTAL: CPT | Performed by: FAMILY MEDICINE

## 2022-02-27 PROCEDURE — 84484 ASSAY OF TROPONIN QUANT: CPT | Performed by: FAMILY MEDICINE

## 2022-02-27 PROCEDURE — 93005 ELECTROCARDIOGRAM TRACING: CPT | Performed by: FAMILY MEDICINE

## 2022-02-27 PROCEDURE — 81001 URINALYSIS AUTO W/SCOPE: CPT | Performed by: FAMILY MEDICINE

## 2022-02-27 PROCEDURE — 83880 ASSAY OF NATRIURETIC PEPTIDE: CPT | Performed by: FAMILY MEDICINE

## 2022-02-27 PROCEDURE — 93010 ELECTROCARDIOGRAM REPORT: CPT | Performed by: INTERNAL MEDICINE

## 2022-02-27 PROCEDURE — 85610 PROTHROMBIN TIME: CPT | Performed by: FAMILY MEDICINE

## 2022-02-27 PROCEDURE — 87086 URINE CULTURE/COLONY COUNT: CPT | Performed by: FAMILY MEDICINE

## 2022-02-27 PROCEDURE — 36415 COLL VENOUS BLD VENIPUNCTURE: CPT

## 2022-02-27 PROCEDURE — 80053 COMPREHEN METABOLIC PANEL: CPT | Performed by: FAMILY MEDICINE

## 2022-02-27 PROCEDURE — 87186 SC STD MICRODIL/AGAR DIL: CPT | Performed by: FAMILY MEDICINE

## 2022-02-27 PROCEDURE — 83735 ASSAY OF MAGNESIUM: CPT | Performed by: FAMILY MEDICINE

## 2022-02-27 PROCEDURE — 87636 SARSCOV2 & INF A&B AMP PRB: CPT | Performed by: FAMILY MEDICINE

## 2022-02-27 PROCEDURE — 85025 COMPLETE CBC W/AUTO DIFF WBC: CPT | Performed by: FAMILY MEDICINE

## 2022-02-27 PROCEDURE — 87077 CULTURE AEROBIC IDENTIFY: CPT | Performed by: FAMILY MEDICINE

## 2022-02-27 PROCEDURE — 70450 CT HEAD/BRAIN W/O DYE: CPT

## 2022-02-27 PROCEDURE — 99284 EMERGENCY DEPT VISIT MOD MDM: CPT

## 2022-02-27 RX ORDER — CLONIDINE HYDROCHLORIDE 0.1 MG/1
0.1 TABLET ORAL ONCE
Status: COMPLETED | OUTPATIENT
Start: 2022-02-27 | End: 2022-02-27

## 2022-02-27 RX ORDER — ACETAMINOPHEN, ASPIRIN AND CAFFEINE 250; 250; 65 MG/1; MG/1; MG/1
2 TABLET, FILM COATED ORAL ONCE AS NEEDED
Status: COMPLETED | OUTPATIENT
Start: 2022-02-27 | End: 2022-02-27

## 2022-02-27 RX ADMIN — ACETAMINOPHEN, ASPIRIN (NSAID) AND CAFFEINE 2 TABLET: 250; 250; 65 TABLET, FILM COATED ORAL at 12:33

## 2022-02-27 RX ADMIN — CLONIDINE HYDROCHLORIDE 0.1 MG: 0.1 TABLET ORAL at 12:22

## 2022-03-02 LAB — BACTERIA SPEC AEROBE CULT: ABNORMAL

## 2022-03-04 ENCOUNTER — TELEPHONE (OUTPATIENT)
Dept: EMERGENCY DEPT | Facility: HOSPITAL | Age: 82
End: 2022-03-04

## 2022-03-04 RX ORDER — CEPHALEXIN 500 MG/1
500 CAPSULE ORAL 2 TIMES DAILY
Qty: 14 CAPSULE | Refills: 0 | Status: SHIPPED | OUTPATIENT
Start: 2022-03-04 | End: 2022-03-11

## 2022-03-07 LAB
QT INTERVAL: 442 MS
QTC INTERVAL: 448 MS

## 2022-09-18 ENCOUNTER — APPOINTMENT (OUTPATIENT)
Dept: CARDIOLOGY | Facility: HOSPITAL | Age: 82
End: 2022-09-18

## 2022-09-18 ENCOUNTER — APPOINTMENT (OUTPATIENT)
Dept: GENERAL RADIOLOGY | Facility: HOSPITAL | Age: 82
End: 2022-09-18

## 2022-09-18 ENCOUNTER — HOSPITAL ENCOUNTER (INPATIENT)
Facility: HOSPITAL | Age: 82
LOS: 2 days | Discharge: NURSING, REHAB OR LONG-TERM CARE | End: 2022-09-20
Attending: FAMILY MEDICINE | Admitting: FAMILY MEDICINE

## 2022-09-18 DIAGNOSIS — I21.4 NON-STEMI (NON-ST ELEVATED MYOCARDIAL INFARCTION): Primary | ICD-10-CM

## 2022-09-18 DIAGNOSIS — Z74.09 IMPAIRED FUNCTIONAL MOBILITY, BALANCE, GAIT, AND ENDURANCE: ICD-10-CM

## 2022-09-18 DIAGNOSIS — Z74.09 IMPAIRED MOBILITY AND ADLS: ICD-10-CM

## 2022-09-18 DIAGNOSIS — Z78.9 IMPAIRED MOBILITY AND ADLS: ICD-10-CM

## 2022-09-18 LAB
ALBUMIN SERPL-MCNC: 4 G/DL (ref 3.5–5.2)
ALBUMIN/GLOB SERPL: 1 G/DL
ALP SERPL-CCNC: 147 U/L (ref 39–117)
ALT SERPL W P-5'-P-CCNC: 16 U/L (ref 1–33)
ANION GAP SERPL CALCULATED.3IONS-SCNC: 11 MMOL/L (ref 5–15)
ANION GAP SERPL CALCULATED.3IONS-SCNC: 8 MMOL/L (ref 5–15)
AST SERPL-CCNC: 54 U/L (ref 1–32)
BASOPHILS # BLD AUTO: 0.07 10*3/MM3 (ref 0–0.2)
BASOPHILS # BLD AUTO: 0.08 10*3/MM3 (ref 0–0.2)
BASOPHILS NFR BLD AUTO: 0.5 % (ref 0–1.5)
BASOPHILS NFR BLD AUTO: 0.7 % (ref 0–1.5)
BH CV ECHO MEAS - ACS: 1.46 CM
BH CV ECHO MEAS - AO MAX PG: 7.9 MMHG
BH CV ECHO MEAS - AO MEAN PG: 4.7 MMHG
BH CV ECHO MEAS - AO ROOT DIAM: 2.8 CM
BH CV ECHO MEAS - AO V2 MAX: 140.6 CM/SEC
BH CV ECHO MEAS - AO V2 VTI: 31.7 CM
BH CV ECHO MEAS - AVA(I,D): 1.66 CM2
BH CV ECHO MEAS - EDV(CUBED): 69.1 ML
BH CV ECHO MEAS - EDV(MOD-SP2): 56.9 ML
BH CV ECHO MEAS - EDV(MOD-SP4): 50.6 ML
BH CV ECHO MEAS - EF(MOD-BP): 46.6 %
BH CV ECHO MEAS - EF(MOD-SP2): 43.4 %
BH CV ECHO MEAS - EF(MOD-SP4): 49.6 %
BH CV ECHO MEAS - ESV(CUBED): 26.5 ML
BH CV ECHO MEAS - ESV(MOD-SP2): 32.2 ML
BH CV ECHO MEAS - ESV(MOD-SP4): 25.5 ML
BH CV ECHO MEAS - FS: 27.4 %
BH CV ECHO MEAS - IVS/LVPW: 1.53 CM
BH CV ECHO MEAS - IVSD: 1.94 CM
BH CV ECHO MEAS - LA DIMENSION: 3.7 CM
BH CV ECHO MEAS - LAT PEAK E' VEL: 6.8 CM/SEC
BH CV ECHO MEAS - LV DIASTOLIC VOL/BSA (35-75): 25.4 CM2
BH CV ECHO MEAS - LV MASS(C)D: 267.9 GRAMS
BH CV ECHO MEAS - LV MAX PG: 1.22 MMHG
BH CV ECHO MEAS - LV MEAN PG: 0.77 MMHG
BH CV ECHO MEAS - LV SYSTOLIC VOL/BSA (12-30): 12.8 CM2
BH CV ECHO MEAS - LV V1 MAX: 55.3 CM/SEC
BH CV ECHO MEAS - LV V1 VTI: 17.4 CM
BH CV ECHO MEAS - LVIDD: 4.1 CM
BH CV ECHO MEAS - LVIDS: 3 CM
BH CV ECHO MEAS - LVOT AREA: 3 CM2
BH CV ECHO MEAS - LVOT DIAM: 1.96 CM
BH CV ECHO MEAS - LVPWD: 1.27 CM
BH CV ECHO MEAS - MED PEAK E' VEL: 3.9 CM/SEC
BH CV ECHO MEAS - MR MAX PG: 88 MMHG
BH CV ECHO MEAS - MR MAX VEL: 469.1 CM/SEC
BH CV ECHO MEAS - MV A MAX VEL: 96.5 CM/SEC
BH CV ECHO MEAS - MV DEC SLOPE: 640.9 CM/SEC2
BH CV ECHO MEAS - MV E MAX VEL: 57.2 CM/SEC
BH CV ECHO MEAS - MV E/A: 0.59
BH CV ECHO MEAS - MV MAX PG: 2.8 MMHG
BH CV ECHO MEAS - MV MEAN PG: 1.47 MMHG
BH CV ECHO MEAS - MV P1/2T: 28.8 MSEC
BH CV ECHO MEAS - MV V2 VTI: 17.4 CM
BH CV ECHO MEAS - MVA(P1/2T): 7.6 CM2
BH CV ECHO MEAS - MVA(VTI): 3 CM2
BH CV ECHO MEAS - PA V2 MAX: 104.5 CM/SEC
BH CV ECHO MEAS - RAP SYSTOLE: 3 MMHG
BH CV ECHO MEAS - RVSP: 23.8 MMHG
BH CV ECHO MEAS - SI(MOD-SP2): 12.4 ML/M2
BH CV ECHO MEAS - SI(MOD-SP4): 12.6 ML/M2
BH CV ECHO MEAS - SV(LVOT): 52.5 ML
BH CV ECHO MEAS - SV(MOD-SP2): 24.7 ML
BH CV ECHO MEAS - SV(MOD-SP4): 25.1 ML
BH CV ECHO MEAS - TAPSE (>1.6): 2.03 CM
BH CV ECHO MEAS - TR MAX PG: 20.8 MMHG
BH CV ECHO MEAS - TR MAX VEL: 227.9 CM/SEC
BH CV ECHO MEASUREMENTS AVERAGE E/E' RATIO: 10.69
BILIRUB SERPL-MCNC: 0.2 MG/DL (ref 0–1.2)
BUN SERPL-MCNC: 23 MG/DL (ref 8–23)
BUN SERPL-MCNC: 26 MG/DL (ref 8–23)
BUN/CREAT SERPL: 19.2 (ref 7–25)
BUN/CREAT SERPL: 20.5 (ref 7–25)
CALCIUM SPEC-SCNC: 8.9 MG/DL (ref 8.6–10.5)
CALCIUM SPEC-SCNC: 9.2 MG/DL (ref 8.6–10.5)
CHLORIDE SERPL-SCNC: 101 MMOL/L (ref 98–107)
CHLORIDE SERPL-SCNC: 102 MMOL/L (ref 98–107)
CK SERPL-CCNC: 391 U/L (ref 20–180)
CO2 SERPL-SCNC: 28 MMOL/L (ref 22–29)
CO2 SERPL-SCNC: 30 MMOL/L (ref 22–29)
CREAT SERPL-MCNC: 1.2 MG/DL (ref 0.57–1)
CREAT SERPL-MCNC: 1.27 MG/DL (ref 0.57–1)
DEPRECATED RDW RBC AUTO: 41.1 FL (ref 37–54)
DEPRECATED RDW RBC AUTO: 42.5 FL (ref 37–54)
EGFRCR SERPLBLD CKD-EPI 2021: 42.3 ML/MIN/1.73
EGFRCR SERPLBLD CKD-EPI 2021: 45.3 ML/MIN/1.73
EOSINOPHIL # BLD AUTO: 0.03 10*3/MM3 (ref 0–0.4)
EOSINOPHIL # BLD AUTO: 0.15 10*3/MM3 (ref 0–0.4)
EOSINOPHIL NFR BLD AUTO: 0.2 % (ref 0.3–6.2)
EOSINOPHIL NFR BLD AUTO: 1.3 % (ref 0.3–6.2)
ERYTHROCYTE [DISTWIDTH] IN BLOOD BY AUTOMATED COUNT: 12.6 % (ref 12.3–15.4)
ERYTHROCYTE [DISTWIDTH] IN BLOOD BY AUTOMATED COUNT: 12.8 % (ref 12.3–15.4)
GLOBULIN UR ELPH-MCNC: 3.9 GM/DL
GLUCOSE BLDC GLUCOMTR-MCNC: 113 MG/DL (ref 70–130)
GLUCOSE BLDC GLUCOMTR-MCNC: 144 MG/DL (ref 70–130)
GLUCOSE BLDC GLUCOMTR-MCNC: 185 MG/DL (ref 70–130)
GLUCOSE SERPL-MCNC: 120 MG/DL (ref 65–99)
GLUCOSE SERPL-MCNC: 120 MG/DL (ref 65–99)
HCT VFR BLD AUTO: 38.9 % (ref 34–46.6)
HCT VFR BLD AUTO: 40.5 % (ref 34–46.6)
HGB BLD-MCNC: 12.9 G/DL (ref 12–15.9)
HGB BLD-MCNC: 13.3 G/DL (ref 12–15.9)
HOLD SPECIMEN: NORMAL
HOLD SPECIMEN: NORMAL
IMM GRANULOCYTES # BLD AUTO: 0.06 10*3/MM3 (ref 0–0.05)
IMM GRANULOCYTES # BLD AUTO: 0.1 10*3/MM3 (ref 0–0.05)
IMM GRANULOCYTES NFR BLD AUTO: 0.5 % (ref 0–0.5)
IMM GRANULOCYTES NFR BLD AUTO: 0.7 % (ref 0–0.5)
INR PPP: 1.04 (ref 0.8–1.2)
LEFT ATRIUM VOLUME INDEX: 20.9 ML/M2
LIPASE SERPL-CCNC: 26 U/L (ref 13–60)
LYMPHOCYTES # BLD AUTO: 2.16 10*3/MM3 (ref 0.7–3.1)
LYMPHOCYTES # BLD AUTO: 2.67 10*3/MM3 (ref 0.7–3.1)
LYMPHOCYTES NFR BLD AUTO: 15.7 % (ref 19.6–45.3)
LYMPHOCYTES NFR BLD AUTO: 23.9 % (ref 19.6–45.3)
MAGNESIUM SERPL-MCNC: 2.1 MG/DL (ref 1.6–2.4)
MAXIMAL PREDICTED HEART RATE: 138 BPM
MCH RBC QN AUTO: 30.2 PG (ref 26.6–33)
MCH RBC QN AUTO: 30.3 PG (ref 26.6–33)
MCHC RBC AUTO-ENTMCNC: 32.8 G/DL (ref 31.5–35.7)
MCHC RBC AUTO-ENTMCNC: 33.2 G/DL (ref 31.5–35.7)
MCV RBC AUTO: 91.1 FL (ref 79–97)
MCV RBC AUTO: 92.3 FL (ref 79–97)
MONOCYTES # BLD AUTO: 0.96 10*3/MM3 (ref 0.1–0.9)
MONOCYTES # BLD AUTO: 1.4 10*3/MM3 (ref 0.1–0.9)
MONOCYTES NFR BLD AUTO: 10.2 % (ref 5–12)
MONOCYTES NFR BLD AUTO: 8.6 % (ref 5–12)
NEUTROPHILS NFR BLD AUTO: 65 % (ref 42.7–76)
NEUTROPHILS NFR BLD AUTO: 7.27 10*3/MM3 (ref 1.7–7)
NEUTROPHILS NFR BLD AUTO: 72.7 % (ref 42.7–76)
NEUTROPHILS NFR BLD AUTO: 9.97 10*3/MM3 (ref 1.7–7)
NRBC BLD AUTO-RTO: 0 /100 WBC (ref 0–0.2)
NRBC BLD AUTO-RTO: 0 /100 WBC (ref 0–0.2)
NT-PROBNP SERPL-MCNC: 2892 PG/ML (ref 0–1800)
PLATELET # BLD AUTO: 266 10*3/MM3 (ref 140–450)
PLATELET # BLD AUTO: 272 10*3/MM3 (ref 140–450)
PMV BLD AUTO: 8.6 FL (ref 6–12)
PMV BLD AUTO: 8.7 FL (ref 6–12)
POTASSIUM SERPL-SCNC: 3.7 MMOL/L (ref 3.5–5.2)
POTASSIUM SERPL-SCNC: 4.1 MMOL/L (ref 3.5–5.2)
PROT SERPL-MCNC: 7.9 G/DL (ref 6–8.5)
PROTHROMBIN TIME: 13.5 SECONDS (ref 11.1–15.3)
QT INTERVAL: 418 MS
QT INTERVAL: 426 MS
QT INTERVAL: 442 MS
QTC INTERVAL: 452 MS
QTC INTERVAL: 457 MS
QTC INTERVAL: 469 MS
RBC # BLD AUTO: 4.27 10*6/MM3 (ref 3.77–5.28)
RBC # BLD AUTO: 4.39 10*6/MM3 (ref 3.77–5.28)
SODIUM SERPL-SCNC: 140 MMOL/L (ref 136–145)
SODIUM SERPL-SCNC: 140 MMOL/L (ref 136–145)
STJ: 2.6 CM
STRESS TARGET HR: 117 BPM
TROPONIN T SERPL-MCNC: 0.47 NG/ML (ref 0–0.03)
TROPONIN T SERPL-MCNC: 10.91 NG/ML (ref 0–0.03)
WBC NRBC COR # BLD: 11.19 10*3/MM3 (ref 3.4–10.8)
WBC NRBC COR # BLD: 13.73 10*3/MM3 (ref 3.4–10.8)
WHOLE BLOOD HOLD COAG: NORMAL
WHOLE BLOOD HOLD SPECIMEN: NORMAL

## 2022-09-18 PROCEDURE — 0 IOPAMIDOL PER 1 ML: Performed by: INTERNAL MEDICINE

## 2022-09-18 PROCEDURE — 85610 PROTHROMBIN TIME: CPT | Performed by: INTERNAL MEDICINE

## 2022-09-18 PROCEDURE — C1725 CATH, TRANSLUMIN NON-LASER: HCPCS | Performed by: INTERNAL MEDICINE

## 2022-09-18 PROCEDURE — 99223 1ST HOSP IP/OBS HIGH 75: CPT | Performed by: INTERNAL MEDICINE

## 2022-09-18 PROCEDURE — 93005 ELECTROCARDIOGRAM TRACING: CPT | Performed by: INTERNAL MEDICINE

## 2022-09-18 PROCEDURE — 83735 ASSAY OF MAGNESIUM: CPT | Performed by: FAMILY MEDICINE

## 2022-09-18 PROCEDURE — 83690 ASSAY OF LIPASE: CPT | Performed by: FAMILY MEDICINE

## 2022-09-18 PROCEDURE — 93010 ELECTROCARDIOGRAM REPORT: CPT | Performed by: INTERNAL MEDICINE

## 2022-09-18 PROCEDURE — 99285 EMERGENCY DEPT VISIT HI MDM: CPT

## 2022-09-18 PROCEDURE — 93571 IV DOP VEL&/PRESS C FLO 1ST: CPT | Performed by: INTERNAL MEDICINE

## 2022-09-18 PROCEDURE — 25010000002 MIDAZOLAM PER 1 MG: Performed by: INTERNAL MEDICINE

## 2022-09-18 PROCEDURE — C1753 CATH, INTRAVAS ULTRASOUND: HCPCS | Performed by: INTERNAL MEDICINE

## 2022-09-18 PROCEDURE — 25010000002 EPTIFIBATIDE 20 MG/10ML SOLUTION: Performed by: INTERNAL MEDICINE

## 2022-09-18 PROCEDURE — C1874 STENT, COATED/COV W/DEL SYS: HCPCS | Performed by: INTERNAL MEDICINE

## 2022-09-18 PROCEDURE — 92928 PRQ TCAT PLMT NTRAC ST 1 LES: CPT | Performed by: INTERNAL MEDICINE

## 2022-09-18 PROCEDURE — 93458 L HRT ARTERY/VENTRICLE ANGIO: CPT | Performed by: INTERNAL MEDICINE

## 2022-09-18 PROCEDURE — C1769 GUIDE WIRE: HCPCS | Performed by: INTERNAL MEDICINE

## 2022-09-18 PROCEDURE — 93306 TTE W/DOPPLER COMPLETE: CPT | Performed by: INTERNAL MEDICINE

## 2022-09-18 PROCEDURE — C1894 INTRO/SHEATH, NON-LASER: HCPCS | Performed by: INTERNAL MEDICINE

## 2022-09-18 PROCEDURE — B2111ZZ FLUOROSCOPY OF MULTIPLE CORONARY ARTERIES USING LOW OSMOLAR CONTRAST: ICD-10-PCS | Performed by: INTERNAL MEDICINE

## 2022-09-18 PROCEDURE — B241ZZ3 ULTRASONOGRAPHY OF MULTIPLE CORONARY ARTERIES, INTRAVASCULAR: ICD-10-PCS | Performed by: INTERNAL MEDICINE

## 2022-09-18 PROCEDURE — C9600 PERC DRUG-EL COR STENT SING: HCPCS | Performed by: INTERNAL MEDICINE

## 2022-09-18 PROCEDURE — 85025 COMPLETE CBC W/AUTO DIFF WBC: CPT | Performed by: INTERNAL MEDICINE

## 2022-09-18 PROCEDURE — 25010000002 EPTIFIBATIDE PER 5 MG: Performed by: INTERNAL MEDICINE

## 2022-09-18 PROCEDURE — 80053 COMPREHEN METABOLIC PANEL: CPT | Performed by: FAMILY MEDICINE

## 2022-09-18 PROCEDURE — 82962 GLUCOSE BLOOD TEST: CPT

## 2022-09-18 PROCEDURE — C1887 CATHETER, GUIDING: HCPCS | Performed by: INTERNAL MEDICINE

## 2022-09-18 PROCEDURE — 83880 ASSAY OF NATRIURETIC PEPTIDE: CPT | Performed by: FAMILY MEDICINE

## 2022-09-18 PROCEDURE — 82550 ASSAY OF CK (CPK): CPT | Performed by: FAMILY MEDICINE

## 2022-09-18 PROCEDURE — 92978 ENDOLUMINL IVUS OCT C 1ST: CPT | Performed by: INTERNAL MEDICINE

## 2022-09-18 PROCEDURE — 84484 ASSAY OF TROPONIN QUANT: CPT | Performed by: FAMILY MEDICINE

## 2022-09-18 PROCEDURE — 25010000002 ONDANSETRON PER 1 MG: Performed by: NURSE PRACTITIONER

## 2022-09-18 PROCEDURE — 85025 COMPLETE CBC W/AUTO DIFF WBC: CPT | Performed by: FAMILY MEDICINE

## 2022-09-18 PROCEDURE — 93005 ELECTROCARDIOGRAM TRACING: CPT

## 2022-09-18 PROCEDURE — 027236Z DILATION OF CORONARY ARTERY, THREE ARTERIES WITH THREE DRUG-ELUTING INTRALUMINAL DEVICES, PERCUTANEOUS APPROACH: ICD-10-PCS | Performed by: INTERNAL MEDICINE

## 2022-09-18 PROCEDURE — 25010000002 PROCHLORPERAZINE 10 MG/2ML SOLUTION: Performed by: NURSE PRACTITIONER

## 2022-09-18 PROCEDURE — 25010000002 FENTANYL CITRATE (PF) 50 MCG/ML SOLUTION: Performed by: INTERNAL MEDICINE

## 2022-09-18 PROCEDURE — 25010000002 ONDANSETRON PER 1 MG: Performed by: INTERNAL MEDICINE

## 2022-09-18 PROCEDURE — 93005 ELECTROCARDIOGRAM TRACING: CPT | Performed by: FAMILY MEDICINE

## 2022-09-18 PROCEDURE — 93005 ELECTROCARDIOGRAM TRACING: CPT | Performed by: NURSE PRACTITIONER

## 2022-09-18 PROCEDURE — 63710000001 INSULIN DETEMIR PER 5 UNITS: Performed by: FAMILY MEDICINE

## 2022-09-18 PROCEDURE — 93306 TTE W/DOPPLER COMPLETE: CPT

## 2022-09-18 PROCEDURE — 71045 X-RAY EXAM CHEST 1 VIEW: CPT

## 2022-09-18 PROCEDURE — 4A023N7 MEASUREMENT OF CARDIAC SAMPLING AND PRESSURE, LEFT HEART, PERCUTANEOUS APPROACH: ICD-10-PCS | Performed by: INTERNAL MEDICINE

## 2022-09-18 DEVICE — XIENCE SKYPOINT™ EVEROLIMUS ELUTING CORONARY STENT SYSTEM 3.25 MM X 12 MM / RAPID-EXCHANGE
Type: IMPLANTABLE DEVICE | Status: FUNCTIONAL
Brand: XIENCE SKYPOINT™

## 2022-09-18 DEVICE — XIENCE SKYPOINT™ EVEROLIMUS ELUTING CORONARY STENT SYSTEM 2.25 MM X 15 MM / RAPID-EXCHANGE
Type: IMPLANTABLE DEVICE | Status: FUNCTIONAL
Brand: XIENCE SKYPOINT™

## 2022-09-18 DEVICE — XIENCE SKYPOINT™ EVEROLIMUS ELUTING CORONARY STENT SYSTEM 2.50 MM X 15 MM / RAPID-EXCHANGE
Type: IMPLANTABLE DEVICE | Status: FUNCTIONAL
Brand: XIENCE SKYPOINT™

## 2022-09-18 RX ORDER — SODIUM CHLORIDE 0.9 % (FLUSH) 0.9 %
3 SYRINGE (ML) INJECTION EVERY 12 HOURS SCHEDULED
Status: DISCONTINUED | OUTPATIENT
Start: 2022-09-19 | End: 2022-09-18

## 2022-09-18 RX ORDER — SODIUM CHLORIDE 9 MG/ML
50 INJECTION, SOLUTION INTRAVENOUS CONTINUOUS
Status: DISCONTINUED | OUTPATIENT
Start: 2022-09-18 | End: 2022-09-18

## 2022-09-18 RX ORDER — CLOPIDOGREL BISULFATE 75 MG/1
75 TABLET ORAL DAILY
Status: CANCELLED | OUTPATIENT
Start: 2022-09-18

## 2022-09-18 RX ORDER — SODIUM CHLORIDE 0.9 % (FLUSH) 0.9 %
10 SYRINGE (ML) INJECTION AS NEEDED
Status: DISCONTINUED | OUTPATIENT
Start: 2022-09-18 | End: 2022-09-19 | Stop reason: HOSPADM

## 2022-09-18 RX ORDER — SODIUM CHLORIDE 0.9 % (FLUSH) 0.9 %
10 SYRINGE (ML) INJECTION EVERY 12 HOURS SCHEDULED
Status: DISCONTINUED | OUTPATIENT
Start: 2022-09-18 | End: 2022-09-18

## 2022-09-18 RX ORDER — PRAVASTATIN SODIUM 20 MG
20 TABLET ORAL 3 TIMES WEEKLY
Status: DISCONTINUED | OUTPATIENT
Start: 2022-09-19 | End: 2022-09-18

## 2022-09-18 RX ORDER — SODIUM CHLORIDE 0.9 % (FLUSH) 0.9 %
10 SYRINGE (ML) INJECTION AS NEEDED
Status: DISCONTINUED | OUTPATIENT
Start: 2022-09-18 | End: 2022-09-20 | Stop reason: HOSPADM

## 2022-09-18 RX ORDER — EPTIFIBATIDE 2 MG/ML
INJECTION, SOLUTION INTRAVENOUS AS NEEDED
Status: DISCONTINUED | OUTPATIENT
Start: 2022-09-18 | End: 2022-09-18 | Stop reason: HOSPADM

## 2022-09-18 RX ORDER — ACETAMINOPHEN 325 MG/1
650 TABLET ORAL EVERY 4 HOURS PRN
Status: DISCONTINUED | OUTPATIENT
Start: 2022-09-18 | End: 2022-09-19

## 2022-09-18 RX ORDER — SODIUM CHLORIDE 9 MG/ML
75 INJECTION, SOLUTION INTRAVENOUS CONTINUOUS
Status: DISCONTINUED | OUTPATIENT
Start: 2022-09-19 | End: 2022-09-18

## 2022-09-18 RX ORDER — METOPROLOL SUCCINATE 100 MG/1
100 TABLET, EXTENDED RELEASE ORAL NIGHTLY
Status: DISCONTINUED | OUTPATIENT
Start: 2022-09-18 | End: 2022-09-19

## 2022-09-18 RX ORDER — LABETALOL HYDROCHLORIDE 5 MG/ML
20 INJECTION, SOLUTION INTRAVENOUS ONCE
Status: COMPLETED | OUTPATIENT
Start: 2022-09-18 | End: 2022-09-18

## 2022-09-18 RX ORDER — ASPIRIN 81 MG/1
81 TABLET ORAL DAILY
Status: DISCONTINUED | OUTPATIENT
Start: 2022-09-18 | End: 2022-09-18

## 2022-09-18 RX ORDER — NICOTINE POLACRILEX 4 MG
15 LOZENGE BUCCAL
Status: DISCONTINUED | OUTPATIENT
Start: 2022-09-18 | End: 2022-09-20 | Stop reason: HOSPADM

## 2022-09-18 RX ORDER — MIDAZOLAM HYDROCHLORIDE 1 MG/ML
INJECTION INTRAMUSCULAR; INTRAVENOUS AS NEEDED
Status: DISCONTINUED | OUTPATIENT
Start: 2022-09-18 | End: 2022-09-18 | Stop reason: HOSPADM

## 2022-09-18 RX ORDER — ONDANSETRON 2 MG/ML
4 INJECTION INTRAMUSCULAR; INTRAVENOUS ONCE
Status: COMPLETED | OUTPATIENT
Start: 2022-09-18 | End: 2022-09-18

## 2022-09-18 RX ORDER — CARBAMAZEPINE 200 MG/1
200 TABLET ORAL EVERY 8 HOURS SCHEDULED
Status: DISCONTINUED | OUTPATIENT
Start: 2022-09-18 | End: 2022-09-20 | Stop reason: HOSPADM

## 2022-09-18 RX ORDER — AMLODIPINE BESYLATE 10 MG/1
10 TABLET ORAL NIGHTLY
Status: DISCONTINUED | OUTPATIENT
Start: 2022-09-18 | End: 2022-09-20 | Stop reason: HOSPADM

## 2022-09-18 RX ORDER — ONDANSETRON 2 MG/ML
4 INJECTION INTRAMUSCULAR; INTRAVENOUS EVERY 6 HOURS PRN
Status: DISCONTINUED | OUTPATIENT
Start: 2022-09-18 | End: 2022-09-18 | Stop reason: SDUPTHER

## 2022-09-18 RX ORDER — NITROGLYCERIN 5 MG/ML
INJECTION, SOLUTION INTRAVENOUS AS NEEDED
Status: DISCONTINUED | OUTPATIENT
Start: 2022-09-18 | End: 2022-09-18 | Stop reason: HOSPADM

## 2022-09-18 RX ORDER — ASPIRIN 81 MG/1
81 TABLET ORAL DAILY
Status: DISCONTINUED | OUTPATIENT
Start: 2022-09-18 | End: 2022-09-20 | Stop reason: HOSPADM

## 2022-09-18 RX ORDER — ONDANSETRON 2 MG/ML
4 INJECTION INTRAMUSCULAR; INTRAVENOUS EVERY 6 HOURS PRN
Status: DISCONTINUED | OUTPATIENT
Start: 2022-09-18 | End: 2022-09-19

## 2022-09-18 RX ORDER — ONDANSETRON 2 MG/ML
4 INJECTION INTRAMUSCULAR; INTRAVENOUS EVERY 6 HOURS PRN
Status: DISCONTINUED | OUTPATIENT
Start: 2022-09-18 | End: 2022-09-18

## 2022-09-18 RX ORDER — NITROGLYCERIN 20 MG/100ML
5-200 INJECTION INTRAVENOUS
Status: DISCONTINUED | OUTPATIENT
Start: 2022-09-18 | End: 2022-09-19

## 2022-09-18 RX ORDER — SODIUM CHLORIDE 0.9 % (FLUSH) 0.9 %
10 SYRINGE (ML) INJECTION AS NEEDED
Status: DISCONTINUED | OUTPATIENT
Start: 2022-09-18 | End: 2022-09-18

## 2022-09-18 RX ORDER — DEXTROSE MONOHYDRATE 25 G/50ML
25 INJECTION, SOLUTION INTRAVENOUS
Status: DISCONTINUED | OUTPATIENT
Start: 2022-09-18 | End: 2022-09-20 | Stop reason: HOSPADM

## 2022-09-18 RX ORDER — PROCHLORPERAZINE EDISYLATE 5 MG/ML
5 INJECTION INTRAMUSCULAR; INTRAVENOUS ONCE
Status: COMPLETED | OUTPATIENT
Start: 2022-09-18 | End: 2022-09-18

## 2022-09-18 RX ORDER — EPTIFIBATIDE 0.75 MG/ML
INJECTION, SOLUTION INTRAVENOUS CONTINUOUS PRN
Status: COMPLETED | OUTPATIENT
Start: 2022-09-18 | End: 2022-09-18

## 2022-09-18 RX ORDER — VENLAFAXINE 37.5 MG/1
37.5 TABLET ORAL DAILY
Status: DISCONTINUED | OUTPATIENT
Start: 2022-09-18 | End: 2022-09-20 | Stop reason: HOSPADM

## 2022-09-18 RX ORDER — INSULIN ASPART 100 [IU]/ML
0-7 INJECTION, SOLUTION INTRAVENOUS; SUBCUTANEOUS
Status: DISCONTINUED | OUTPATIENT
Start: 2022-09-18 | End: 2022-09-20 | Stop reason: HOSPADM

## 2022-09-18 RX ORDER — SODIUM CHLORIDE 9 MG/ML
75 INJECTION, SOLUTION INTRAVENOUS CONTINUOUS
Status: DISCONTINUED | OUTPATIENT
Start: 2022-09-18 | End: 2022-09-18

## 2022-09-18 RX ORDER — LISINOPRIL 40 MG/1
40 TABLET ORAL DAILY
Status: DISCONTINUED | OUTPATIENT
Start: 2022-09-18 | End: 2022-09-20 | Stop reason: HOSPADM

## 2022-09-18 RX ORDER — SODIUM CHLORIDE 0.9 % (FLUSH) 0.9 %
10 SYRINGE (ML) INJECTION AS NEEDED
Status: DISCONTINUED | OUTPATIENT
Start: 2022-09-19 | End: 2022-09-18

## 2022-09-18 RX ORDER — METOPROLOL TARTRATE 5 MG/5ML
5 INJECTION INTRAVENOUS ONCE
Status: COMPLETED | OUTPATIENT
Start: 2022-09-18 | End: 2022-09-18

## 2022-09-18 RX ORDER — LANOLIN ALCOHOL/MO/W.PET/CERES
3 CREAM (GRAM) TOPICAL NIGHTLY
COMMUNITY

## 2022-09-18 RX ORDER — FENTANYL CITRATE 50 UG/ML
INJECTION, SOLUTION INTRAMUSCULAR; INTRAVENOUS AS NEEDED
Status: DISCONTINUED | OUTPATIENT
Start: 2022-09-18 | End: 2022-09-18 | Stop reason: HOSPADM

## 2022-09-18 RX ORDER — NITROGLYCERIN 20 MG/100ML
10-50 INJECTION INTRAVENOUS
Status: DISCONTINUED | OUTPATIENT
Start: 2022-09-18 | End: 2022-09-18

## 2022-09-18 RX ORDER — LIDOCAINE HYDROCHLORIDE 20 MG/ML
INJECTION, SOLUTION INFILTRATION; PERINEURAL AS NEEDED
Status: DISCONTINUED | OUTPATIENT
Start: 2022-09-18 | End: 2022-09-18 | Stop reason: HOSPADM

## 2022-09-18 RX ORDER — ROSUVASTATIN CALCIUM 20 MG/1
20 TABLET, COATED ORAL NIGHTLY
Status: DISCONTINUED | OUTPATIENT
Start: 2022-09-18 | End: 2022-09-19

## 2022-09-18 RX ORDER — SODIUM CHLORIDE 0.9 % (FLUSH) 0.9 %
3 SYRINGE (ML) INJECTION EVERY 12 HOURS SCHEDULED
Status: DISCONTINUED | OUTPATIENT
Start: 2022-09-18 | End: 2022-09-19 | Stop reason: HOSPADM

## 2022-09-18 RX ORDER — SODIUM CHLORIDE 9 MG/ML
100 INJECTION, SOLUTION INTRAVENOUS CONTINUOUS
Status: DISCONTINUED | OUTPATIENT
Start: 2022-09-18 | End: 2022-09-20

## 2022-09-18 RX ADMIN — LISINOPRIL 40 MG: 40 TABLET ORAL at 12:51

## 2022-09-18 RX ADMIN — CARBAMAZEPINE 200 MG: 200 TABLET ORAL at 21:12

## 2022-09-18 RX ADMIN — AMLODIPINE BESYLATE 10 MG: 10 TABLET ORAL at 20:10

## 2022-09-18 RX ADMIN — ONDANSETRON 4 MG: 2 INJECTION INTRAMUSCULAR; INTRAVENOUS at 16:27

## 2022-09-18 RX ADMIN — CARBAMAZEPINE 200 MG: 200 TABLET ORAL at 12:54

## 2022-09-18 RX ADMIN — LABETALOL HYDROCHLORIDE 20 MG: 5 INJECTION, SOLUTION INTRAVENOUS at 06:51

## 2022-09-18 RX ADMIN — NITROGLYCERIN 10 MCG/MIN: 20 INJECTION INTRAVENOUS at 07:24

## 2022-09-18 RX ADMIN — ASPIRIN 81 MG: 81 TABLET, FILM COATED ORAL at 12:51

## 2022-09-18 RX ADMIN — METOPROLOL SUCCINATE 100 MG: 100 TABLET, EXTENDED RELEASE ORAL at 20:09

## 2022-09-18 RX ADMIN — Medication 3 ML: at 21:18

## 2022-09-18 RX ADMIN — PROCHLORPERAZINE EDISYLATE 5 MG: 5 INJECTION INTRAMUSCULAR; INTRAVENOUS at 18:39

## 2022-09-18 RX ADMIN — ROSUVASTATIN CALCIUM 20 MG: 20 TABLET, FILM COATED ORAL at 21:12

## 2022-09-18 RX ADMIN — ONDANSETRON 4 MG: 2 INJECTION INTRAMUSCULAR; INTRAVENOUS at 07:00

## 2022-09-18 RX ADMIN — SODIUM CHLORIDE 100 ML/HR: 9 INJECTION, SOLUTION INTRAVENOUS at 23:21

## 2022-09-18 RX ADMIN — TICAGRELOR 90 MG: 90 TABLET ORAL at 12:51

## 2022-09-18 RX ADMIN — VENLAFAXINE 37.5 MG: 37.5 TABLET ORAL at 12:51

## 2022-09-18 RX ADMIN — INSULIN DETEMIR 25 UNITS: 100 INJECTION, SOLUTION SUBCUTANEOUS at 21:12

## 2022-09-18 RX ADMIN — TICAGRELOR 90 MG: 90 TABLET ORAL at 21:12

## 2022-09-18 RX ADMIN — METOPROLOL TARTRATE 5 MG: 5 INJECTION INTRAVENOUS at 06:33

## 2022-09-18 NOTE — PROGRESS NOTES
[facility] Medicine Services  INPATIENT PROGRESS NOTE    Length of Stay: 0  Date of Admission: 9/18/2022  Primary Care Physician: Sean Briones MD    Subjective   Chief Complaint: Chest pain  HPI:  82 year old female with a  History of CAD, DM, HTN, HLD who presented with chest pain.  She was found to have ST depressions on EKG and elevated troponin.  Cardiology consulted and she underwent LHC which found multiple lesions.  She is recommended staged intervention. She is currently chest pain free. She was seen in cath lab holding.     Review of Systems   Constitutional: Negative for chills and fever.   Respiratory: Negative for shortness of breath.    Cardiovascular: Negative for chest pain.   Gastrointestinal: Negative for abdominal pain, nausea and vomiting.        All pertinent negatives and positives are as above. All other systems have been reviewed and are negative unless otherwise stated.     Objective    Temp:  [98.4 °F (36.9 °C)] 98.4 °F (36.9 °C)  Heart Rate:  [68-85] 78  Resp:  [18-20] 20  BP: (155-221)/() 155/78         Physical Exam  Vitals reviewed.   Constitutional:       General: She is not in acute distress.     Appearance: Normal appearance.   HENT:      Head: Normocephalic and atraumatic.   Cardiovascular:      Rate and Rhythm: Normal rate and regular rhythm.   Pulmonary:      Effort: Pulmonary effort is normal. No respiratory distress.      Breath sounds: Normal breath sounds.   Abdominal:      General: There is no distension.      Palpations: Abdomen is soft.      Tenderness: There is no abdominal tenderness.   Musculoskeletal:         General: No swelling or deformity.   Skin:     General: Skin is warm and dry.   Neurological:      General: No focal deficit present.      Mental Status: She is alert and oriented to person, place, and time.         Results Review:  I have reviewed the labs, radiology results, and diagnostic  studies.    Laboratory Data:   Results from last 7 days   Lab Units 09/18/22  0626   SODIUM mmol/L 140   POTASSIUM mmol/L 3.7   CHLORIDE mmol/L 102   CO2 mmol/L 30.0*   BUN mg/dL 23   CREATININE mg/dL 1.20*   GLUCOSE mg/dL 120*   CALCIUM mg/dL 9.2   BILIRUBIN mg/dL 0.2   ALK PHOS U/L 147*   ALT (SGPT) U/L 16   AST (SGOT) U/L 54*   ANION GAP mmol/L 8.0     Estimated Creatinine Clearance: 40 mL/min (A) (by C-G formula based on SCr of 1.2 mg/dL (H)).  Results from last 7 days   Lab Units 09/18/22  0626   MAGNESIUM mg/dL 2.1         Results from last 7 days   Lab Units 09/18/22  0626   WBC 10*3/mm3 11.19*   HEMOGLOBIN g/dL 12.9   HEMATOCRIT % 38.9   PLATELETS 10*3/mm3 272           Culture Data:   No results found for: BLOODCX  No results found for: URINECX  No results found for: RESPCX  No results found for: WOUNDCX  No results found for: STOOLCX  No components found for: BODYFLD    Radiology Data:   Imaging Results (Last 24 Hours)     Procedure Component Value Units Date/Time    XR Chest 1 View [[id number]] Collected: 09/18/22 0617     Updated: 09/18/22 0654    Narrative:      PORTABLE CHEST X-RAY    CLINICAL HISTORY: Chest Pain triage protocol  Chest Pain Triage Protocol    COMPARISON:  12/17/2013.    FINDINGS:  Single portable view of the chest obtained.  There are  various overlying monitor leads/artifacts in place. The lungs are  well expanded and clear where they can be visualized.  Cardiac  size is within normal limits.  Vascularity is normal considering  technique.  No pleural fluid is demonstrated by portable imaging.   Mild aortic ectasia        Impression:        No active disease by portable imaging.    Electronically signed by:  Chase Moore MD  9/18/2022 6:52 AM  CDT Workstation: [id number]          I have reviewed the patient's current medications.     Assessment/Plan     Active Hospital Problems    Diagnosis    • Non-STEMI (non-ST elevated myocardial infarction) (HCC)    DMII  HTN  CAD    Plan:    S/P  LHC, PCI to Cx and OM, plan for staged intervention to RCA  Cardiology consultation appreciated  Aspirin, Brilinta  Statin  Glucose control: Levemir, SSI  BP control: Toprol-XL, Lisinopril, Norvasc  NGT infusion  VTE PPx: SCD      I confirmed that the patient's Advance Care Plan is present, code status is documented, or surrogate decision maker is listed in the patient's medical record.     The patient was evaluated during the global COVID-19 pandemic, and the diagnosis was suspected/considered upon their initial presentation.  Evaluation, treatment, and testing were consistent with current guidelines for patients who present with complaints or symptoms that may be related to COVID-19.          This document has been electronically signed by MIKA Bonilla on September 18, 2022 11:42 CDT

## 2022-09-18 NOTE — H&P
[facility] Medicine Admission      Date of Admission: 9/18/2022      Primary Care Physician: Sean Briones MD      Chief Complaint: Chest pain    HPI:    This is a 82-year-old female with past medical history of coronary artery disease, diabetes, hypertension, dyslipidemia presenting to the hospital with chest pain.  Reports it started during the nighttime and was mostly left-sided chest pain with radiation to the shoulder.  Was pressure-like pain.  In the ER she was found to have ST depressions in multiple leads, cardiology was also called.    Past Medical History:  has a past medical history of Cancer ([facility]) (2009), Coronary artery disease, Diabetes mellitus (HCC), Hypertension, Injury of back, Myocardial infarction (HCC), and Stroke (HCC).    Past Surgical History:  has a past surgical history that includes Uvulopalatopharyngoplasty; Tubal ligation; Cataract extraction; and Trigeminal nerve decompression.    Family History: family history is not on file.  Father has hypertension    Social History:  reports that she has never smoked. She has never used smokeless tobacco. She reports that she does not drink alcohol and does not use drugs.    Allergies:   Allergies   Allergen Reactions   • Atorvastatin      Memory   • Levofloxacin    • Meclizine    • Morphine        Medications: Scheduled Meds:   Continuous Infusions:nitroglycerin, 10-50 mcg/min, Last Rate: 10 mcg/min (09/18/22 0724)      PRN Meds:.sodium chloride  No current facility-administered medications on file prior to encounter.     Current Outpatient Medications on File Prior to Encounter   Medication Sig Dispense Refill   • amLODIPine (NORVASC) 10 MG tablet Take 10 mg by mouth Every Night.     • Aspirin (ASPIR-81 PO) Take 81 mg by mouth Daily.     • carBAMazepine (TEGretol) 200 MG tablet Take 200 mg by mouth 2 (Two) Times a Day.     • clopidogrel (PLAVIX) 75 MG tablet Take 75 mg by mouth Daily.     •  fluticasone (FLONASE) 50 MCG/ACT nasal spray 2 sprays into the nostril(s) as directed by provider 2 (Two) Times a Day. 16 g 5   • Insulin Glargine (LANTUS SOLOSTAR) 100 UNIT/ML injection pen Inject 28 Units under the skin Every Night.     • lisinopril (PRINIVIL,ZESTRIL) 40 MG tablet Take 40 mg by mouth Daily.     • metoprolol succinate XL (TOPROL-XL) 100 MG 24 hr tablet Take 100 mg by mouth Every Night.     • ondansetron ODT (Zofran ODT) 4 MG disintegrating tablet Place 1 tablet on the tongue Every 8 (Eight) Hours As Needed for Nausea or Vomiting. 30 tablet 0   • pravastatin (PRAVACHOL) 20 MG tablet Take 20 mg by mouth 3 (Three) Times a Week.     • venlafaxine (EFFEXOR) 37.5 MG tablet Take 37.5 mg by mouth 2 (Two) Times a Day.       Review of Systems:  Review of Systems   Constitutional: Negative for fever.   HENT: Negative for congestion.    Respiratory: Negative for shortness of breath.    Cardiovascular: Negative for chest pain.   Gastrointestinal: Negative for constipation.      Otherwise complete ROS is negative except as mentioned above.    Physical Exam:   Temp:  [98.4 °F (36.9 °C)] 98.4 °F (36.9 °C)  Heart Rate:  [68-72] 72  Resp:  [20] 20  BP: (189-221)/() 195/92  Physical Exam  Vitals and nursing note reviewed.   Constitutional:       General: She is not in acute distress.     Appearance: She is well-developed. She is not diaphoretic.   HENT:      Head: Normocephalic and atraumatic.   Cardiovascular:      Rate and Rhythm: Normal rate.   Pulmonary:      Effort: Pulmonary effort is normal. No respiratory distress.      Breath sounds: No wheezing.   Abdominal:      General: There is no distension.      Palpations: Abdomen is soft.   Musculoskeletal:         General: Normal range of motion.   Skin:     General: Skin is warm and dry.   Neurological:      Mental Status: She is alert.      Cranial Nerves: No cranial nerve deficit.   Psychiatric:         Behavior: Behavior normal.         Thought Content:  Thought content normal.         Judgment: Judgment normal.           Results Reviewed:  I have personally reviewed current lab, radiology, and data and agree with results.  Lab Results (last 24 hours)     Procedure Component Value Units Date/Time    Comprehensive Metabolic Panel [[id number]]  (Abnormal) Collected: 09/18/22 0626    Specimen: Blood Updated: 09/18/22 0659     Glucose 120 mg/dL      BUN 23 mg/dL      Creatinine 1.20 mg/dL      Sodium 140 mmol/L      Potassium 3.7 mmol/L      Comment: Slight hemolysis detected by analyzer. Results may be affected.        Chloride 102 mmol/L      CO2 30.0 mmol/L      Calcium 9.2 mg/dL      Total Protein 7.9 g/dL      Albumin 4.00 g/dL      ALT (SGPT) 16 U/L      AST (SGOT) 54 U/L      Alkaline Phosphatase 147 U/L      Total Bilirubin 0.2 mg/dL      Globulin 3.9 gm/dL      A/G Ratio 1.0 g/dL      BUN/Creatinine Ratio 19.2     Anion Gap 8.0 mmol/L      eGFR 45.3 mL/min/1.73      Comment: National Kidney Foundation and American Society of Nephrology (ASN) Task Force recommended calculation based on the Chronic Kidney Disease Epidemiology Collaboration (CKD-EPI) equation refit without adjustment for race.       Narrative:      GFR Normal >60  Chronic Kidney Disease <60  Kidney Failure <15      Lipase [[id number]]  (Normal) Collected: 09/18/22 0626    Specimen: Blood Updated: 09/18/22 0659     Lipase 26 U/L     CK [[id number]]  (Abnormal) Collected: 09/18/22 0626    Specimen: Blood Updated: 09/18/22 0659     Creatine Kinase 391 U/L     Magnesium [[id number]]  (Normal) Collected: 09/18/22 0626    Specimen: Blood Updated: 09/18/22 0659     Magnesium 2.1 mg/dL     Troponin [[id number]]  (Abnormal) Collected: 09/18/22 0626    Specimen: Blood Updated: 09/18/22 0658     Troponin T 0.471 ng/mL     Narrative:      Troponin T Reference Range:  <= 0.03 ng/mL-   Negative for AMI  >0.03 ng/mL-     Abnormal for myocardial necrosis.  Clinicians would have to utilize clinical acumen, EKG,  Troponin and serial changes to determine if it is an Acute Myocardial Infarction or myocardial injury due to an underlying chronic condition.       Results may be falsely decreased if patient taking Biotin.      BNP [[id number]]  (Abnormal) Collected: 09/18/22 0626    Specimen: Blood Updated: 09/18/22 0655     proBNP 2,892.0 pg/mL     Narrative:      Among patients with dyspnea, NT-proBNP is highly sensitive for the detection of acute congestive heart failure. In addition NT-proBNP of <300 pg/ml effectively rules out acute congestive heart failure with 99% negative predictive value.    Results may be falsely decreased if patient taking Biotin.      CBC & Differential [[id number]]  (Abnormal) Collected: 09/18/22 0626    Specimen: Blood Updated: 09/18/22 0636    Narrative:      The following orders were created for panel order CBC & Differential.  Procedure                               Abnormality         Status                     ---------                               -----------         ------                     CBC Auto Differential[[id number]]        Abnormal            Final result                 Please view results for these tests on the individual orders.    CBC Auto Differential [[id number]]  (Abnormal) Collected: 09/18/22 0626    Specimen: Blood Updated: 09/18/22 0636     WBC 11.19 10*3/mm3      RBC 4.27 10*6/mm3      Hemoglobin 12.9 g/dL      Hematocrit 38.9 %      MCV 91.1 fL      MCH 30.2 pg      MCHC 33.2 g/dL      RDW 12.6 %      RDW-SD 41.1 fl      MPV 8.6 fL      Platelets 272 10*3/mm3      Neutrophil % 65.0 %      Lymphocyte % 23.9 %      Monocyte % 8.6 %      Eosinophil % 1.3 %      Basophil % 0.7 %      Immature Grans % 0.5 %      Neutrophils, Absolute 7.27 10*3/mm3      Lymphocytes, Absolute 2.67 10*3/mm3      Monocytes, Absolute 0.96 10*3/mm3      Eosinophils, Absolute 0.15 10*3/mm3      Basophils, Absolute 0.08 10*3/mm3      Immature Grans, Absolute 0.06 10*3/mm3      nRBC 0.0 /100 WBC     Green  Top (Gel) [[id number]] Collected: 09/18/22 0626    Specimen: Blood Updated: 09/18/22 0633    Light Blue Top [[id number]] Collected: 09/18/22 0626    Specimen: Blood Updated: 09/18/22 0633    Gold Top - SST [[id number]] Collected: 09/18/22 0626    Specimen: Blood Updated: 09/18/22 0633    [place] Draw [[id number]] Collected: 09/18/22 0626    Specimen: Blood Updated: 09/18/22 0633    Narrative:      The following orders were created for panel order [place] Draw.  Procedure                               Abnormality         Status                     ---------                               -----------         ------                     Green Top (Gel)[[id number]]                                  In process                 Lavender Top[[id number]]                                     In process                 Gold Top - SST[[id number]]                                   In process                 Light Blue Top[[id number]]                                   In process                   Please view results for these tests on the individual orders.    Lavender Top [[id number]] Collected: 09/18/22 0626    Specimen: Blood Updated: 09/18/22 0633        Imaging Results (Last 24 Hours)     Procedure Component Value Units Date/Time    XR Chest 1 View [[id number]] Collected: 09/18/22 0617     Updated: 09/18/22 0654    Narrative:      PORTABLE CHEST X-RAY    CLINICAL HISTORY: Chest Pain triage protocol  Chest Pain Triage Protocol    COMPARISON:  12/17/2013.    FINDINGS:  Single portable view of the chest obtained.  There are  various overlying monitor leads/artifacts in place. The lungs are  well expanded and clear where they can be visualized.  Cardiac  size is within normal limits.  Vascularity is normal considering  technique.  No pleural fluid is demonstrated by portable imaging.   Mild aortic ectasia        Impression:        No active disease by portable imaging.    Electronically signed by:  Chase Moore MD  9/18/2022 6:52 AM  CDT  Workstation: [id number]            Assessment:    Active Hospital Problems    Diagnosis    • Non-STEMI (non-ST elevated myocardial infarction) (HCC)            NSTEMI-elevated troponin, cardiology has been called, planning on taking her to catheterization lab.  We will continue with nitroglycerin    Diabetes mellitus- sliding scale insulin    Hypertension-resume home medication, will monitor    Coronary artery disease-continue with antiplatelet therapy    DVT prophylaxis-SCD    Patient's full code        Tez Will MD  09/18/22  07:29 CDT

## 2022-09-18 NOTE — NURSING NOTE
"Patient had another episode of N/V while on bedside commode and stated she was very \"tired and wore out\" Sean BRENNAN called and notified- EKG and compazine ordered  "

## 2022-09-18 NOTE — NURSING NOTE
Received patent to ppu post PCI to hold awaiting CCU bed. Patient A/O X 4 vitals stable.  Son is at bedside see flow sheets for complete documentation.

## 2022-09-18 NOTE — ED NOTES
"Pt brought in by EMS from [facility] for CP. Pt received nitro and aspirin in route. Pt states that the chest pain began \"sometime during the night\" and rates the pain 8/10.  "

## 2022-09-18 NOTE — PLAN OF CARE
Goal Outcome Evaluation:  Plan of Care Reviewed With: patient        Progress: no change  Outcome Evaluation: n/v post cath meds given and ineffective- new meds ordered, no chest pain, htn- titrating nitro, TR band is deflated and no complications at site.

## 2022-09-18 NOTE — CONSULTS
Cardiovascular Medicine          James Golden MD  [address] [place] [address]   [place] [address]     Thank you for asking me to see Magy Barton for acute coronary syndrome.    History of Present Illness  This is a 82 y.o. female with acute onset of chest pain patient reports past medical history of diabetes woke up today and reported significant chest discomfort midsternal radiating to the back patient reports no alleviating or aggravating factors pain is continuous at moderate degree no difficulty breathing on presentation she has nonspecific but concerning ST elevations just shy of criteria for STEMI in lead aVL with no secondary continuous lead but they are expressive ST depressions in the anterior leads however at time of presentation her systolic blood pressures greater than 200 and at time of examination it is 225/129  Patient reports some nausea  And despite medical management in the ER reports an 8 out of 10 chest pain continuous no alleviating or aggravating factors at this point the pain is at rest    PMHX DM  She is agreeable for [facility]     Review of Systems - ROS  Constitution: Negative for weakness, weight gain and weight loss.   HENT: Negative for congestion.    Eyes: Negative for blurred vision.   Cardiovascular: As mentioned above  Respiratory: Negative for cough and hemoptysis.    Endocrine: Negative for polydipsia and polyuria.   Hematologic/Lymphatic: Negative for bleeding problem. Does not bruise/bleed easily.   Skin: Negative for flushing.   Musculoskeletal: Negative for neck pain and stiffness.   Gastrointestinal: Negative for abdominal pain, diarrhea, jaundice, melena, nausea and vomiting.   Genitourinary: Negative for dysuria and hematuria.   Neurological: Negative for dizziness, focal weakness and numbness.   Psychiatric/Behavioral: Negative for altered mental status and depression.          All other systems were reviewed and were negative.    family history is not on file.      reports that she has never smoked. She has never used smokeless tobacco. She reports that she does not drink alcohol and does not use drugs.    Allergies   Allergen Reactions   • Atorvastatin      Memory   • Levofloxacin    • Meclizine    • Morphine          Current Facility-Administered Medications:   •  sodium chloride 0.9 % flush 10 mL, 10 mL, Intravenous, PRN, James Golden MD    Current Outpatient Medications:   •  amLODIPine (NORVASC) 10 MG tablet, Take 10 mg by mouth Every Night., Disp: , Rfl:   •  Aspirin (ASPIR-81 PO), Take 81 mg by mouth Daily., Disp: , Rfl:   •  carBAMazepine (TEGretol) 200 MG tablet, Take 200 mg by mouth 2 (Two) Times a Day., Disp: , Rfl:   •  clopidogrel (PLAVIX) 75 MG tablet, Take 75 mg by mouth Daily., Disp: , Rfl:   •  fluticasone (FLONASE) 50 MCG/ACT nasal spray, 2 sprays into the nostril(s) as directed by provider 2 (Two) Times a Day., Disp: 16 g, Rfl: 5  •  Insulin Glargine (LANTUS SOLOSTAR) 100 UNIT/ML injection pen, Inject 28 Units under the skin Every Night., Disp: , Rfl:   •  lisinopril (PRINIVIL,ZESTRIL) 40 MG tablet, Take 40 mg by mouth Daily., Disp: , Rfl:   •  metoprolol succinate XL (TOPROL-XL) 100 MG 24 hr tablet, Take 100 mg by mouth Every Night., Disp: , Rfl:   •  ondansetron ODT (Zofran ODT) 4 MG disintegrating tablet, Place 1 tablet on the tongue Every 8 (Eight) Hours As Needed for Nausea or Vomiting., Disp: 30 tablet, Rfl: 0  •  pravastatin (PRAVACHOL) 20 MG tablet, Take 20 mg by mouth 3 (Three) Times a Week., Disp: , Rfl:   •  venlafaxine (EFFEXOR) 37.5 MG tablet, Take 37.5 mg by mouth 2 (Two) Times a Day., Disp: , Rfl:     Physical Exam:  Vitals:    09/18/22 0627 09/18/22 0633 09/18/22 0651 09/18/22 0656   BP: (!) 195/95 (!) 207/97 (!) 221/109 (!) 189/91   BP Location:    Right arm   Patient Position:    Lying   Pulse: 70 70 68 72   Resp:    20   Temp:       TempSrc:       SpO2: 96%   94%   Weight:       Height:         Current Pain Level: none  Pulse Ox:  Normal  on room air  General: alert, appears stated age and cooperative     Body Habitus: well-nourished    HEENT: Head: Normocephalic, no lesions, without obvious abnormality. No arcus senilis, xanthelasma or xanthomas.    Neuro: alert, oriented x3  Pulses: 2+ and symmetric  JVP: Volume/Pulsation: Normal.  Normal waveforms.   Appropriate inspiratory decrease.  No Kussmaul's. No Ramon's.   Carotid Exam: no bruit normal pulsation bilaterally   Carotid Volume: normal.     Respirations: no increased work of breathing   Chest:  Normal    Pulmonary:Normal   Precordium: Normal impulses. P2 is not palpable.  RV Heave: absent  LV Heave: absent  [place]:  normal size and placement  Palpable S4: absent.  Heart rate: normal    Heart Rhythm: regular     Heart Sounds: S1: normal  S2: normal  S3: absent   S4: absent  Opening Snap: absent    Pericardial Rub:  Absent: .    Abdomen:   Appearance: normal .  Palpation: Soft, non-tender to palpation, bowel sounds positive in all four quadrants; no guarding or rebound tenderness  Extremity: no edema.   LE Skin: no rashes  LE Hair:  normal  LE Pulses: well perfused with normal pulses in the distal extremities  Pallor on elevation: Absent. Rubor on dependency: None      DATA REVIEWED:     EKG. I personally reviewed and interpreted the EKG.  Paced  Nonspecific ST changes with ST abnormality noted in aVL and reciprocal changes    ECG/EMG Results (all)       Procedure Component Value Units Date/Time    ECG 12 Lead [[id number]] Collected: 08/04/21 1805     Updated: 08/04/21 2151     QT Interval 494 ms      QTC Interval 521 ms     Narrative:      Test Reason : CP  Blood Pressure :   */*   mmHG  Vent. Rate :  67 BPM     Atrial Rate :  67 BPM     P-R Int : 160 ms          QRS Dur : 166 ms      QT Int : 494 ms       P-R-T Axes :  57 -82  80 degrees     QTc Int : 521 ms    Trial sensed ventricular pacedv rhythm  Abnormal ECG  No previous ECGs available  Confirmed by Pepe Gaitan ([id number]) on  8/4/2021 9:50:59 PM    Referred By: BRIAN           Confirmed By: Pepe Gaitan    ECG 12 Lead [[id number]] Collected: 08/04/21 2219     Updated: 08/04/21 2219    ECG 12 Lead [[id number]] Collected: 08/05/21 0253     Updated: 08/05/21 0253          ---------------------------------------------------  -----------------------------------------------------  CXR/Imaging:   Imaging Results (Most Recent)     Procedure Component Value Units Date/Time    XR Chest 1 View [[id number]] Collected: 09/18/22 0617     Updated: 09/18/22 0654    Narrative:      PORTABLE CHEST X-RAY    CLINICAL HISTORY: Chest Pain triage protocol  Chest Pain Triage Protocol    COMPARISON:  12/17/2013.    FINDINGS:  Single portable view of the chest obtained.  There are  various overlying monitor leads/artifacts in place. The lungs are  well expanded and clear where they can be visualized.  Cardiac  size is within normal limits.  Vascularity is normal considering  technique.  No pleural fluid is demonstrated by portable imaging.   Mild aortic ectasia        Impression:        No active disease by portable imaging.    Electronically signed by:  Chase Moore MD  9/18/2022 6:52 AM  [employer] Workstation: [id number]            --------------------------------------------------------------------------------------------------  LABS:     The CVD Risk score (Susan et al., 2008) failed to calculate for the following reasons:    The 2008 CVD risk score is only valid for ages 30 to 74    The patient has a prior MI, stroke, CHF, or peripheral vascular disease diagnosis         Lab Results   Component Value Date    GLUCOSE 146 (H) 02/27/2022    BUN 26 (H) 02/27/2022    CREATININE 1.22 (H) 02/27/2022    EGFRIFNONA 42 (L) 02/27/2022    EGFRIFAFRI 73 03/26/2019    BCR 21.3 02/27/2022    K 4.1 02/27/2022    CO2 26.0 02/27/2022    CALCIUM 9.0 02/27/2022    ALBUMIN 3.80 02/27/2022    AST 16 02/27/2022    ALT 10 02/27/2022     Lab Results   Component Value Date     WBC 11.19 (H) 09/18/2022    HGB 12.9 09/18/2022    HCT 38.9 09/18/2022    MCV 91.1 09/18/2022     09/18/2022     Lab Results   Component Value Date    CHOL 219 (H) 01/07/2020    CHLPL 194 03/22/2019    TRIG 251 (H) 01/07/2020    HDL 41 01/07/2020     (H) 01/07/2020     Lab Results   Component Value Date    TSH 2.81 03/21/2019     Lab Results   Component Value Date    CKTOTAL 32 02/27/2022    TROPONINI 0.02 03/21/2019    TROPONINT <0.010 02/27/2022     Lab Results   Component Value Date    HGBA1C 9.7 (H) 03/01/2021     No results found for: DDIMER  Lab Results   Component Value Date    ALT 10 02/27/2022     Lab Results   Component Value Date    HGBA1C 9.7 (H) 03/01/2021    HGBA1C 6.7 (H) 08/31/2020    HGBA1C 8.4 (H) 01/07/2020     Lab Results   Component Value Date    CREATININE 1.22 (H) 02/27/2022     No results found for: IRON, TIBC, FERRITIN  Lab Results   Component Value Date    INR 1.06 02/27/2022    PROTIME 13.7 02/27/2022       Assessment & Plan    #1 diabetes uncontrolled hemoglobin A1c is noted  #2 acute kidney injury on chronic kidney disease suspected or secondary to diabetes  3 obesity  4 hypertensive emergency nonresponsive to Lopressor and labetalol  5 acute coronary syndrome suspect non-ST elevation MI versus STEMI in the left circumflex territory I have discussed this with ER physician EKG does not currently meet criteria but if patient has chest pain we would likely need to activate the Cath Lab  Patient has been advised of risk benefits and alternatives she understands  Thank you for allowing me to participate in this patient's care  Ayaz iWld DO    No follow-ups on file.    This document has been electronically signed by Ayaz Wild DO on September 18, 2022 06:58 CDT      >30 min of critical care time spent at pt bedside in ED department managing her HTN ER and monitoring her EKG including push of lopressor. Labetalol and nitro drip after non resolution of p[ain decision to  activate the cath lab was made   Pt advised of risk benefit and alternatives   Ayaz Wild DO

## 2022-09-19 LAB
ANION GAP SERPL CALCULATED.3IONS-SCNC: 10 MMOL/L (ref 5–15)
BASOPHILS # BLD AUTO: 0.08 10*3/MM3 (ref 0–0.2)
BASOPHILS NFR BLD AUTO: 0.6 % (ref 0–1.5)
BUN SERPL-MCNC: 24 MG/DL (ref 8–23)
BUN/CREAT SERPL: 19.2 (ref 7–25)
CALCIUM SPEC-SCNC: 8.3 MG/DL (ref 8.6–10.5)
CHLORIDE SERPL-SCNC: 103 MMOL/L (ref 98–107)
CO2 SERPL-SCNC: 28 MMOL/L (ref 22–29)
CREAT SERPL-MCNC: 1.25 MG/DL (ref 0.57–1)
DEPRECATED RDW RBC AUTO: 41.7 FL (ref 37–54)
EGFRCR SERPLBLD CKD-EPI 2021: 43.1 ML/MIN/1.73
EOSINOPHIL # BLD AUTO: 0.05 10*3/MM3 (ref 0–0.4)
EOSINOPHIL NFR BLD AUTO: 0.4 % (ref 0.3–6.2)
ERYTHROCYTE [DISTWIDTH] IN BLOOD BY AUTOMATED COUNT: 12.9 % (ref 12.3–15.4)
GLUCOSE BLDC GLUCOMTR-MCNC: 101 MG/DL (ref 70–130)
GLUCOSE BLDC GLUCOMTR-MCNC: 132 MG/DL (ref 70–130)
GLUCOSE BLDC GLUCOMTR-MCNC: 93 MG/DL (ref 70–130)
GLUCOSE SERPL-MCNC: 126 MG/DL (ref 65–99)
HCT VFR BLD AUTO: 32.9 % (ref 34–46.6)
HGB BLD-MCNC: 11.2 G/DL (ref 12–15.9)
IMM GRANULOCYTES # BLD AUTO: 0.11 10*3/MM3 (ref 0–0.05)
IMM GRANULOCYTES NFR BLD AUTO: 0.8 % (ref 0–0.5)
LYMPHOCYTES # BLD AUTO: 2.22 10*3/MM3 (ref 0.7–3.1)
LYMPHOCYTES NFR BLD AUTO: 17.1 % (ref 19.6–45.3)
MAGNESIUM SERPL-MCNC: 1.9 MG/DL (ref 1.6–2.4)
MCH RBC QN AUTO: 30.5 PG (ref 26.6–33)
MCHC RBC AUTO-ENTMCNC: 34 G/DL (ref 31.5–35.7)
MCV RBC AUTO: 89.6 FL (ref 79–97)
MONOCYTES # BLD AUTO: 1.56 10*3/MM3 (ref 0.1–0.9)
MONOCYTES NFR BLD AUTO: 12 % (ref 5–12)
NEUTROPHILS NFR BLD AUTO: 69.1 % (ref 42.7–76)
NEUTROPHILS NFR BLD AUTO: 8.99 10*3/MM3 (ref 1.7–7)
NRBC BLD AUTO-RTO: 0 /100 WBC (ref 0–0.2)
PLATELET # BLD AUTO: 233 10*3/MM3 (ref 140–450)
PMV BLD AUTO: 8.9 FL (ref 6–12)
POTASSIUM SERPL-SCNC: 3.6 MMOL/L (ref 3.5–5.2)
QT INTERVAL: 424 MS
QTC INTERVAL: 492 MS
RBC # BLD AUTO: 3.67 10*6/MM3 (ref 3.77–5.28)
SODIUM SERPL-SCNC: 141 MMOL/L (ref 136–145)
WBC NRBC COR # BLD: 13.01 10*3/MM3 (ref 3.4–10.8)

## 2022-09-19 PROCEDURE — 83735 ASSAY OF MAGNESIUM: CPT | Performed by: FAMILY MEDICINE

## 2022-09-19 PROCEDURE — 25010000002 MIDAZOLAM PER 1 MG: Performed by: INTERNAL MEDICINE

## 2022-09-19 PROCEDURE — B2111ZZ FLUOROSCOPY OF MULTIPLE CORONARY ARTERIES USING LOW OSMOLAR CONTRAST: ICD-10-PCS | Performed by: INTERNAL MEDICINE

## 2022-09-19 PROCEDURE — 99152 MOD SED SAME PHYS/QHP 5/>YRS: CPT | Performed by: INTERNAL MEDICINE

## 2022-09-19 PROCEDURE — C1887 CATHETER, GUIDING: HCPCS | Performed by: INTERNAL MEDICINE

## 2022-09-19 PROCEDURE — C1874 STENT, COATED/COV W/DEL SYS: HCPCS | Performed by: INTERNAL MEDICINE

## 2022-09-19 PROCEDURE — C1725 CATH, TRANSLUMIN NON-LASER: HCPCS | Performed by: INTERNAL MEDICINE

## 2022-09-19 PROCEDURE — 80048 BASIC METABOLIC PNL TOTAL CA: CPT | Performed by: FAMILY MEDICINE

## 2022-09-19 PROCEDURE — 99232 SBSQ HOSP IP/OBS MODERATE 35: CPT | Performed by: INTERNAL MEDICINE

## 2022-09-19 PROCEDURE — 97166 OT EVAL MOD COMPLEX 45 MIN: CPT

## 2022-09-19 PROCEDURE — C1894 INTRO/SHEATH, NON-LASER: HCPCS | Performed by: INTERNAL MEDICINE

## 2022-09-19 PROCEDURE — 82962 GLUCOSE BLOOD TEST: CPT

## 2022-09-19 PROCEDURE — 0 IOPAMIDOL PER 1 ML: Performed by: INTERNAL MEDICINE

## 2022-09-19 PROCEDURE — C1769 GUIDE WIRE: HCPCS | Performed by: INTERNAL MEDICINE

## 2022-09-19 PROCEDURE — 4A023N7 MEASUREMENT OF CARDIAC SAMPLING AND PRESSURE, LEFT HEART, PERCUTANEOUS APPROACH: ICD-10-PCS | Performed by: INTERNAL MEDICINE

## 2022-09-19 PROCEDURE — 25010000002 HEPARIN (PORCINE) PER 1000 UNITS: Performed by: INTERNAL MEDICINE

## 2022-09-19 PROCEDURE — C9600 PERC DRUG-EL COR STENT SING: HCPCS | Performed by: INTERNAL MEDICINE

## 2022-09-19 PROCEDURE — 85025 COMPLETE CBC W/AUTO DIFF WBC: CPT | Performed by: FAMILY MEDICINE

## 2022-09-19 PROCEDURE — 97162 PT EVAL MOD COMPLEX 30 MIN: CPT

## 2022-09-19 PROCEDURE — 25010000002 FENTANYL CITRATE (PF) 50 MCG/ML SOLUTION: Performed by: INTERNAL MEDICINE

## 2022-09-19 PROCEDURE — 027034Z DILATION OF CORONARY ARTERY, ONE ARTERY WITH DRUG-ELUTING INTRALUMINAL DEVICE, PERCUTANEOUS APPROACH: ICD-10-PCS | Performed by: INTERNAL MEDICINE

## 2022-09-19 PROCEDURE — 99153 MOD SED SAME PHYS/QHP EA: CPT

## 2022-09-19 PROCEDURE — 92928 PRQ TCAT PLMT NTRAC ST 1 LES: CPT | Performed by: INTERNAL MEDICINE

## 2022-09-19 DEVICE — XIENCE SKYPOINT™ EVEROLIMUS ELUTING CORONARY STENT SYSTEM 3.00 MM X 23 MM / RAPID-EXCHANGE
Type: IMPLANTABLE DEVICE | Status: FUNCTIONAL
Brand: XIENCE SKYPOINT™

## 2022-09-19 RX ORDER — MIDAZOLAM HYDROCHLORIDE 1 MG/ML
INJECTION INTRAMUSCULAR; INTRAVENOUS AS NEEDED
Status: DISCONTINUED | OUTPATIENT
Start: 2022-09-19 | End: 2022-09-19 | Stop reason: HOSPADM

## 2022-09-19 RX ORDER — CARVEDILOL 12.5 MG/1
12.5 TABLET ORAL 2 TIMES DAILY WITH MEALS
Status: DISCONTINUED | OUTPATIENT
Start: 2022-09-19 | End: 2022-09-20 | Stop reason: HOSPADM

## 2022-09-19 RX ORDER — ACETAMINOPHEN 325 MG/1
650 TABLET ORAL EVERY 6 HOURS PRN
Status: DISCONTINUED | OUTPATIENT
Start: 2022-09-19 | End: 2022-09-20 | Stop reason: HOSPADM

## 2022-09-19 RX ORDER — LIDOCAINE HYDROCHLORIDE 20 MG/ML
INJECTION, SOLUTION INFILTRATION; PERINEURAL AS NEEDED
Status: DISCONTINUED | OUTPATIENT
Start: 2022-09-19 | End: 2022-09-19 | Stop reason: HOSPADM

## 2022-09-19 RX ORDER — HEPARIN SODIUM 1000 [USP'U]/ML
INJECTION, SOLUTION INTRAVENOUS; SUBCUTANEOUS AS NEEDED
Status: DISCONTINUED | OUTPATIENT
Start: 2022-09-19 | End: 2022-09-19 | Stop reason: HOSPADM

## 2022-09-19 RX ORDER — ONDANSETRON 2 MG/ML
4 INJECTION INTRAMUSCULAR; INTRAVENOUS EVERY 6 HOURS PRN
Status: DISCONTINUED | OUTPATIENT
Start: 2022-09-19 | End: 2022-09-20 | Stop reason: HOSPADM

## 2022-09-19 RX ORDER — VERAPAMIL HYDROCHLORIDE 2.5 MG/ML
INJECTION, SOLUTION INTRAVENOUS AS NEEDED
Status: DISCONTINUED | OUTPATIENT
Start: 2022-09-19 | End: 2022-09-19 | Stop reason: HOSPADM

## 2022-09-19 RX ORDER — FENTANYL CITRATE 50 UG/ML
INJECTION, SOLUTION INTRAMUSCULAR; INTRAVENOUS AS NEEDED
Status: DISCONTINUED | OUTPATIENT
Start: 2022-09-19 | End: 2022-09-19 | Stop reason: HOSPADM

## 2022-09-19 RX ORDER — NITROGLYCERIN 20 MG/100ML
INJECTION INTRAVENOUS CONTINUOUS PRN
Status: DISCONTINUED | OUTPATIENT
Start: 2022-09-19 | End: 2022-09-19 | Stop reason: HOSPADM

## 2022-09-19 RX ORDER — ROSUVASTATIN CALCIUM 20 MG/1
20 TABLET, COATED ORAL NIGHTLY
Status: DISCONTINUED | OUTPATIENT
Start: 2022-09-19 | End: 2022-09-20 | Stop reason: HOSPADM

## 2022-09-19 RX ADMIN — VENLAFAXINE 37.5 MG: 37.5 TABLET ORAL at 11:08

## 2022-09-19 RX ADMIN — CARBAMAZEPINE 200 MG: 200 TABLET ORAL at 05:10

## 2022-09-19 RX ADMIN — CARVEDILOL 12.5 MG: 12.5 TABLET, FILM COATED ORAL at 17:22

## 2022-09-19 RX ADMIN — LISINOPRIL 40 MG: 40 TABLET ORAL at 11:08

## 2022-09-19 RX ADMIN — CARVEDILOL 12.5 MG: 12.5 TABLET, FILM COATED ORAL at 11:08

## 2022-09-19 RX ADMIN — TICAGRELOR 90 MG: 90 TABLET ORAL at 20:38

## 2022-09-19 RX ADMIN — AMLODIPINE BESYLATE 10 MG: 10 TABLET ORAL at 20:38

## 2022-09-19 RX ADMIN — ROSUVASTATIN CALCIUM 20 MG: 20 TABLET, FILM COATED ORAL at 20:39

## 2022-09-19 RX ADMIN — TICAGRELOR 90 MG: 90 TABLET ORAL at 07:48

## 2022-09-19 RX ADMIN — CARBAMAZEPINE 200 MG: 200 TABLET ORAL at 14:12

## 2022-09-19 RX ADMIN — CARBAMAZEPINE 200 MG: 200 TABLET ORAL at 21:03

## 2022-09-19 RX ADMIN — SODIUM CHLORIDE 100 ML/HR: 9 INJECTION, SOLUTION INTRAVENOUS at 22:37

## 2022-09-19 RX ADMIN — ASPIRIN 81 MG: 81 TABLET, FILM COATED ORAL at 07:47

## 2022-09-19 NOTE — PLAN OF CARE
Goal Outcome Evaluation:  Plan of Care Reviewed With: patient           Outcome Evaluation: PT eval completed. Patient lethargic at the beginning but was appropriate once awake. MinAx1 for supine>sit and ModAx1 for sit>supine. MinAx1 for sit<>stand transfer with RW. MinAx1 for ambulation 2'x1 lateral stepping with RW. Patient reports she has normally sleeps in a recliner and does not use a RW. Anticipate SNF to continue her rehab to home plan.

## 2022-09-19 NOTE — PLAN OF CARE
Problem: Adult Inpatient Plan of Care  Goal: Plan of Care Review  Outcome: Ongoing, Progressing  Flowsheets (Taken 9/19/2022 8760)  Progress: improving  Plan of Care Reviewed With: patient  Outcome Evaluation: stent to rca today, vss   Goal Outcome Evaluation:  Plan of Care Reviewed With: patient        Progress: improving  Outcome Evaluation: stent to rca today, vss

## 2022-09-19 NOTE — THERAPY EVALUATION
Patient Name: Magy Barton  : 1940    MRN: [id number]                              Today's Date: 2022       Admit Date: 2022    Visit Dx:     ICD-10-CM ICD-9-CM   1. Non-STEMI (non-ST elevated myocardial infarction) (HCC)  I21.4 410.70   2. Impaired functional mobility, balance, gait, and endurance  Z74.09 V49.89     Patient Active Problem List   Diagnosis   • Urinary tract infection with hematuria   • Colitis   • Non-STEMI (non-ST elevated myocardial infarction) (HCC)     Past Medical History:   Diagnosis Date   • Cancer (HCC)     leukemia   • Coronary artery disease    • Diabetes mellitus (HCC)    • Hypertension    • Injury of back    • Myocardial infarction (HCC)    • Stroke (HCC)      Past Surgical History:   Procedure Laterality Date   • CATARACT EXTRACTION     • TRIGEMINAL NERVE DECOMPRESSION     • TUBAL ABDOMINAL LIGATION     • UVULOPALATOPHARYNGOPLASTY        General Information     Row Name 22 1030          Physical Therapy Time and Intention    Document Type evaluation  -LR     Mode of Treatment individual therapy;physical therapy  -LR     Row Name 22 1030          General Information    Patient Profile Reviewed yes  -LR     Prior Level of Function independent:;all household mobility;gait;transfer;bed mobility;ADL's  -LR     Existing Precautions/Restrictions fall  -LR     Barriers to Rehab previous functional deficit  -LR     Row Name 22 1030          Living Environment    People in Home facility resident  -LR     Row Name 22 1030          Home Main Entrance    Number of Stairs, Main Entrance none  -LR     Stair Railings, Main Entrance none  -LR     Row Name 22 1030          Stairs Within Home, Primary    Stairs, Within Home, Primary Patient is rehabing to home  -LR     Row Name 22 1030          Cognition    Orientation Status (Cognition) oriented to;person;place;situation  -LR     Row Name 22 1030          Safety Issues, Functional Mobility     Safety Issues Affecting Function (Mobility) at risk behavior observed;awareness of need for assistance;safety precaution awareness;ability to follow commands;insight into deficits/self-awareness;safety precautions follow-through/compliance  -LR     Impairments Affecting Function (Mobility) balance;endurance/activity tolerance;strength  -LR           User Key  (r) = Recorded By, (t) = Taken By, (c) = Cosigned By    Initials Name Provider Type    LR Isai Mcmillan Physical Therapist               Mobility     Row Name 09/19/22 1030          Bed Mobility    Bed Mobility supine-sit;sit-supine  -LR     Supine-Sit [place] (Bed Mobility) minimum assist (75% patient effort)  -LR     Sit-Supine [place] (Bed Mobility) moderate assist (50% patient effort)  -LR     Assistive Device (Bed Mobility) bed rails;head of bed elevated  -     Row Name 09/19/22 1030          Sit-Stand Transfer    Sit-Stand [place] (Transfers) minimum assist (75% patient effort)  -     Row Name 09/19/22 1030          Gait/Stairs (Locomotion)    [place] Level (Gait) minimum assist (75% patient effort)  -LR     Assistive Device (Gait) walker, front-wheeled  -LR     Distance in Feet (Gait) 2'x1 laterally  -LR     Deviations/Abnormal Patterns (Gait) gait speed decreased;festinating/shuffling;[place] decreased  -LR           User Key  (r) = Recorded By, (t) = Taken By, (c) = Cosigned By    Initials Name Provider Type    LR Isai Mcmillan Physical Therapist               Obj/Interventions     Row Name 09/19/22 1030          Range of Motion Comprehensive    General Range of Motion no range of motion deficits identified  -     Row Name 09/19/22 1030          Strength Comprehensive (MMT)    Comment, General Manual Muscle Testing (MMT) Assessment BLE grossly 3+/5  -     Row Name 09/19/22 1030          Sensory Assessment (Somatosensory)    Sensory Assessment (Somatosensory) sensation intact;LE sensation intact  -           User Key   (r) = Recorded By, (t) = Taken By, (c) = Cosigned By    Initials Name Provider Type    LR Isai Mcmillan Physical Therapist               Goals/Plan     Row Name 09/19/22 1030          Bed Mobility Goal 1 (PT)    Activity/Assistive Device (Bed Mobility Goal 1, PT) sit to supine;supine to sit  -LR     [place] Level/Cues Needed (Bed Mobility Goal 1, PT) modified independence  -LR     Time Frame (Bed Mobility Goal 1, PT) by discharge  -LR     Progress/Outcomes (Bed Mobility Goal 1, PT) goal not met  -LR     Row Name 09/19/22 1030          Transfer Goal 1 (PT)    Activity/Assistive Device (Transfer Goal 1, PT) sit-to-stand/stand-to-sit;bed-to-chair/chair-to-bed  -LR     [place] Level/Cues Needed (Transfer Goal 1, PT) modified independence  -LR     Time Frame (Transfer Goal 1, PT) by discharge  -LR     Progress/Outcome (Transfer Goal 1, PT) goal not met  -LR     Row Name 09/19/22 1030          Gait Training Goal 1 (PT)    Activity/Assistive Device (Gait Training Goal 1, PT) gait (walking locomotion);assistive device use  -LR     [place] Level (Gait Training Goal 1, PT) modified independence  -LR     Distance (Gait Training Goal 1, PT) 50'x2  -LR     Time Frame (Gait Training Goal 1, PT) by discharge  -LR     Progress/Outcome (Gait Training Goal 1, PT) goal not met  -LR     Row Name 09/19/22 1030          Stairs Goal 1 (PT)    Activity/Assistive Device (Stairs Goal 1, PT) stairs, all skills;ascending stairs;descending stairs  -LR     [place] Level/Cues Needed (Stairs Goal 1, PT) modified independence  -LR     Number of Stairs (Stairs Goal 1, PT) 1  -LR     Time Frame (Stairs Goal 1, PT) by discharge  -LR     Progress/Outcome (Stairs Goal 1, PT) goal not met  -LR     Row Name 09/19/22 1030          Therapy Assessment/Plan (PT)    Planned Therapy Interventions (PT) balance training;bed mobility training;gait training;home exercise program;joint mobilization;lumbar stabilization;manual therapy  techniques;neuromuscular re-education;orthotic fitting/training;patient/family education;postural re-education;ROM (range of motion);stair training;strengthening;stretching;wheelchair management/propulsion training;transfer training  -LR           User Key  (r) = Recorded By, (t) = Taken By, (c) = Cosigned By    Initials Name Provider Type    LR Isai Mcmillan Physical Therapist               Clinical Impression     Row Name 09/19/22 1030          Pain    Pretreatment Pain Rating 0/10 - no pain  -LR     Posttreatment Pain Rating 0/10 - no pain  -LR     Row Name 09/19/22 1030          Plan of Care Review    Plan of Care Reviewed With patient  -LR     Outcome Evaluation PT eval completed. Patient lethargic at the beginning but was appropriate once awake. MinAx1 for supine>sit and ModAx1 for sit>supine. MinAx1 for sit<>stand transfer with RW. MinAx1 for ambulation 2'x1 lateral stepping with RW. Patient reports she has normally sleeps in a recliner and does not use a RW. Anticipate SNF to continue her rehab to home plan.  -     Row Name 09/19/22 1030          Therapy Assessment/Plan (PT)    Patient/Family Therapy Goals Statement (PT) Patient wants to return home.  -LR     Rehab Potential (PT) good, to achieve stated therapy goals  -LR     Criteria for Skilled Interventions Met (PT) yes;meets criteria;skilled treatment is necessary  -LR     Therapy Frequency (PT) other (see comments)  3-7d/week  -LR     Predicted Duration of Therapy Intervention (PT) Until d/c or until all goals met  -     Row Name 09/19/22 1030          Vital Signs    Pre Systolic BP Rehab 164  -LR     Pre Treatment Diastolic BP 83  -LR     Post Systolic BP Rehab 173  -LR     Post Treatment Diastolic BP 87  -LR     Pretreatment Heart Rate (beats/min) 87  -LR     Posttreatment Heart Rate (beats/min) 88  -LR     Pre SpO2 (%) 96  -LR     O2 Delivery Pre Treatment room air  -LR     Post SpO2 (%) 95  -LR     O2 Delivery Post Treatment room air  -LR      Pre Patient Position Supine  -LR     Post Patient Position Supine  -LR     Row Name 09/19/22 1030          Positioning and Restraints    Pre-Treatment Position in bed  -LR     Post Treatment Position bed  -LR     In Bed notified nsg;call light within reach;encouraged to call for assist;with family/caregiver  -LR           User Key  (r) = Recorded By, (t) = Taken By, (c) = Cosigned By    Initials Name Provider Type    Isai Holcomb Physical Therapist               Outcome Measures     Row Name 09/19/22 1030          How much help from another person do you currently need...    Turning from your back to your side while in flat bed without using bedrails? 3  -LR     Moving from lying on back to sitting on the side of a flat bed without bedrails? 3  -LR     Moving to and from a bed to a chair (including a wheelchair)? 3  -LR     Standing up from a chair using your arms (e.g., wheelchair, bedside chair)? 3  -LR     Climbing 3-5 steps with a railing? 3  -LR     To walk in hospital room? 3  -LR     AM-PAC 6 Clicks Score (PT) 18  -LR     Highest level of mobility 6 --> Walked 10 steps or more  -LR     Row Name 09/19/22 1030          Functional Assessment    Outcome Measure Options AM-PAC 6 Clicks Basic Mobility (PT)  -LR           User Key  (r) = Recorded By, (t) = Taken By, (c) = Cosigned By    Initials Name Provider Type    Isai [place] Physical Therapist                             Physical Therapy Education                 Title: PT OT SLP Therapies (In Progress)     Topic: Physical Therapy (Done)     Point: Mobility training (Done)     Learning Progress Summary           Patient Acceptance, E,TB, VU by LR at 9/19/2022 1052    Comment: Educated on PT POC and goals                   Point: Home exercise program (Done)     Learning Progress Summary           Patient Acceptance, E,TB, VU by LR at 9/19/2022 1052    Comment: Educated on PT POC and goals                   Point: Body mechanics (Done)     Learning  Progress Summary           Patient Acceptance, E,TB, VU by LR at 9/19/2022 1052    Comment: Educated on PT POC and goals                   Point: Precautions (Done)     Learning Progress Summary           Patient Acceptance, E,TB, VU by LR at 9/19/2022 1052    Comment: Educated on PT POC and goals                               User Key     Initials Effective Dates Name Provider Type Discipline    LR 06/16/21 -  Isai Mcmillan Physical Therapist PT              PT Recommendation and Plan  Planned Therapy Interventions (PT): balance training, bed mobility training, gait training, home exercise program, joint mobilization, lumbar stabilization, manual therapy techniques, neuromuscular re-education, orthotic fitting/training, patient/family education, postural re-education, ROM (range of motion), stair training, strengthening, stretching, wheelchair management/propulsion training, transfer training  Plan of Care Reviewed With: patient  Outcome Evaluation: PT eval completed. Patient lethargic at the beginning but was appropriate once awake. MinAx1 for supine>sit and ModAx1 for sit>supine. MinAx1 for sit<>stand transfer with RW. MinAx1 for ambulation 2'x1 lateral stepping with RW. Patient reports she has normally sleeps in a recliner and does not use a RW. Anticipate SNF to continue her rehab to home plan.     Time Calculation:    PT Charges     Row Name 09/19/22 1053             Time Calculation    Start Time 1030  -LR      Stop Time 1053  -LR      Time Calculation (min) 23 min  -LR      PT Received On 09/19/22  -LR      PT Goal Re-Cert Due Date 10/02/22  -LR              Time Calculation- PT    Total Timed Code Minutes- PT 23 minute(s)  -LR              Untimed Charges    PT Eval/Re-eval Minutes 23  -LR              Total Minutes    Untimed Charges Total Minutes 23  -LR       Total Minutes 23  -LR            User Key  (r) = Recorded By, (t) = Taken By, (c) = Cosigned By    Initials Name Provider Type    LR Deyanira  Isai Physical Therapist              Therapy Charges for Today     Code Description Service Date Service Provider Modifiers Qty    [id number] HC PT EVAL MOD COMPLEXITY 2 9/19/2022 Isai Mcmillan GP 1          PT G-Codes  Outcome Measure Options: AM-PAC 6 Clicks Basic Mobility (PT)  AM-PAC 6 Clicks Score (PT): 18    Isai Mcmillan  9/19/2022

## 2022-09-19 NOTE — THERAPY EVALUATION
Acute Care - Occupational Therapy Initial Evaluation  [facility]     Patient Name: Magy Barton  : 1940  MRN: [id number]  Today's Date: 2022  Onset of Illness/Injury or Date of Surgery: 22  Date of Referral to OT: 22  Referring Physician: Dr. Packer    Admit Date: 2022       ICD-10-CM ICD-9-CM   1. Non-STEMI (non-ST elevated myocardial infarction) (HCC)  I21.4 410.70   2. Impaired functional mobility, balance, gait, and endurance  Z74.09 V49.89   3. Impaired mobility and ADLs  Z74.09 V49.89    Z78.9      Patient Active Problem List   Diagnosis   • Urinary tract infection with hematuria   • Colitis   • Non-STEMI (non-ST elevated myocardial infarction) (HCC)     Past Medical History:   Diagnosis Date   • Cancer (HCC)     leukemia   • Coronary artery disease    • Diabetes mellitus (HCC)    • Hypertension    • Injury of back    • Myocardial infarction (HCC)    • Stroke (HCC)      Past Surgical History:   Procedure Laterality Date   • CATARACT EXTRACTION     • TRIGEMINAL NERVE DECOMPRESSION     • TUBAL ABDOMINAL LIGATION     • UVULOPALATOPHARYNGOPLASTY           OT ASSESSMENT FLOWSHEET (last 12 hours)     OT Evaluation and Treatment     Row Name 22 1540                   OT Time and Intention    Subjective Information complains of;fatigue  -KH        Document Type evaluation  -KH        Mode of Treatment individual therapy;occupational therapy  -KH        Patient Effort good  -                  General Information    Patient Profile Reviewed yes  -KH        Onset of Illness/Injury or Date of Surgery 22  -        Referring Physician Dr. Packer  -        Patient/Family/Caregiver Comments/Observations no family present  -        General Observations of Patient IV, A-line (R wrist)  -        Prior Level of Function independent:;all household mobility;transfer;ADL's  -KH        Equipment Currently Used at Home none  -KH        Existing  Precautions/Restrictions fall  -KH        Risks Reviewed patient:  -KH        Benefits Reviewed patient:  -KH                  Living Environment    Current Living Arrangements residential facility  -KH        Home Accessibility wheelchair accessible  -KH        People in Home facility resident  -                  Home Use of Assistive/Adaptive Equipment    Equipment Currently Used at Home walker, rolling  -                  Pain Assessment    Pretreatment Pain Rating 0/10 - no pain  -KH        Posttreatment Pain Rating 0/10 - no pain  -KH                  Cognition    Orientation Status (Cognition) oriented to;person;place;time  -                  Range of Motion Comprehensive    General Range of Motion bilateral upper extremity ROM WFL  -                  Strength Comprehensive (MMT)    Comment, General Manual Muscle Testing (MMT) Assessment JAE RUE d/t a-line, LUE grossly 3/5  -                  Sensory Assessment (Somatosensory)    Sensory Assessment (Somatosensory) UE sensation intact  -                  Mobility    Extremity Weight-bearing Status other (see comments)   limited RUE WBing/lifting pulling until A-line removed  -KH                  Bed Mobility    Bed Mobility supine-sit;sit-supine  -        Supine-Sit [place] (Bed Mobility) minimum assist (75% patient effort)  -        Sit-Supine [place] (Bed Mobility) contact guard  -                  Transfers    Sit-Stand [place] (Transfers) minimum assist (75% patient effort)  -                  Plan of Care Review    Plan of Care Reviewed With patient  -KH        Progress improving  -KH                  Vital Signs    Pre Systolic BP Rehab 170  -KH        Pre Treatment Diastolic BP 77  -KH        Post Systolic BP Rehab 159  -KH        Post Treatment Diastolic BP 77  -KH        Pretreatment Heart Rate (beats/min) 70  -KH        Posttreatment Heart Rate (beats/min) 75  -KH        Pre SpO2 (%) 95  -KH        O2 Delivery Pre  Treatment room air  -        Post SpO2 (%) 96  -KH        O2 Delivery Post Treatment room air  -                  Positioning and Restraints    Pre-Treatment Position in bed  -        Post Treatment Position bed  -        In Bed notified nsg;fowlers;call light within reach;encouraged to call for assist;exit alarm on  -                  Therapy Assessment/Plan (OT)    Date of Referral to OT 09/19/22  -        Patient/Family Therapy Goal Statement (OT) return home  -        OT Diagnosis impaired mobility and ADLs  -        Rehab Potential (OT) good, to achieve stated therapy goals  -        Criteria for Skilled Therapeutic Interventions Met (OT) yes;meets criteria;skilled treatment is necessary  -        Therapy Frequency (OT) other (see comments)  3-7x/wk  -        Predicted Duration of Therapy Intervention (OT) until OT goals met or d/c facility  -        Problem List (OT) mobility;strength  -        Activity Limitations Related to Problem List (OT) unable to ambulate safely;unable to transfer safely;BADLs not performed adequately or safely;IADLs not performed adequately or safely  -        Planned Therapy Interventions (OT) activity tolerance training;adaptive equipment training;BADL retraining;functional balance retraining;IADL retraining;ROM/therapeutic exercise;strengthening exercise;transfer/mobility retraining;patient/caregiver education/training  -                  Evaluation Complexity (OT)    Review Occupational Profile/Medical/Therapy History Complexity expanded/moderate complexity  -        Assessment, Occupational Performance/Identification of Deficit Complexity 3-5 performance deficits  -        Clinical Decision Making Complexity (OT) detailed assessment/moderate complexity  -        Overall Complexity of Evaluation (OT) moderate complexity  -                  Therapy Plan Review/Discharge Plan (OT)    Therapy Plan Review (OT) evaluation/treatment results  reviewed;care plan/treatment goals reviewed;risks/benefits reviewed;current/potential barriers reviewed;participants voiced agreement with care plan;participants included;patient  -KH        Anticipated Discharge Disposition (OT) skilled nursing facility  return to SNF- facility resident  -KH                  OT Goals    Transfer Goal Selection (OT) transfer, OT goal 1  -KH        Bathing Goal Selection (OT) bathing, OT goal 1  -KH        Dressing Goal Selection (OT) dressing, OT goal 1  -KH        Toileting Goal Selection (OT) toileting, OT goal 1  -KH        Activity Tolerance Goal Selection (OT) activity tolerance, OT goal 1  -KH                  Transfer Goal 1 (OT)    Activity/Assistive Device (Transfer Goal 1, OT) sit-to-stand/stand-to-sit;bed-to-chair/chair-to-bed;toilet  -KH        [place] Level/Cues Needed (Transfer Goal 1, OT) independent  -KH        Time Frame (Transfer Goal 1, OT) long term goal (LTG);by discharge  -KH        Progress/Outcome (Transfer Goal 1, OT) goal not met  -KH                  Bathing Goal 1 (OT)    Activity/Device (Bathing Goal 1, OT) lower body bathing  -KH        [place] Level/Cues Needed (Bathing Goal 1, OT) modified independence  -KH        Time Frame (Bathing Goal 1, OT) long term goal (LTG);by discharge  -KH        Progress/Outcomes (Bathing Goal 1, OT) goal not met  -KH                  Dressing Goal 1 (OT)    Activity/Device (Dressing Goal 1, OT) lower body dressing  -KH        [place]/Cues Needed (Dressing Goal 1, OT) independent  -KH        Time Frame (Dressing Goal 1, OT) long term goal (LTG);by discharge  -KH        Progress/Outcome (Dressing Goal 1, OT) goal not met  -KH                  Toileting Goal 1 (OT)    Activity/Device (Toileting Goal 1, OT) toileting skills, all  -KH        [place] Level/Cues Needed (Toileting Goal 1, OT) independent  -KH        Time Frame (Toileting Goal 1, OT) long term goal (LTG);by discharge  -KH         Progress/Outcome (Toileting Goal 1, OT) goal not met  -                   Activity Tolerance Goal 1 (OT)    Activity Level (Endurance Goal 1, OT) 15 min activity;O2 sat >/ equal to 88%  -        Time Frame (Activity Tolerance Goal 1, OT) long term goal (LTG);by discharge  -        Progress/Outcome (Activity Tolerance Goal 1, OT) goal not met  -              User Key  (r) = Recorded By, (t) = Taken By, (c) = Cosigned By    Initials Name Effective Dates    Meliza De Los Santos, OT 06/16/21 -                  Occupational Therapy Education                 Title: PT OT SLP Therapies (In Progress)     Topic: Occupational Therapy (In Progress)     Point: ADL training (Done)     Description:   Instruct learner(s) on proper safety adaptation and remediation techniques during self care or transfers.   Instruct in proper use of assistive devices.              Learning Progress Summary           Patient Acceptance, E, VU by ORALIA at 9/19/2022 1611    Comment: Educated pt on fall prevention and to avoid RUE pushing/pulling until A-line removed                   Point: Home exercise program (Not Started)     Description:   Instruct learner(s) on appropriate technique for monitoring, assisting and/or progressing therapeutic exercises/activities.              Learner Progress:  Not documented in this visit.          Point: Precautions (Done)     Description:   Instruct learner(s) on prescribed precautions during self-care and functional transfers.              Learning Progress Summary           Patient Acceptance, E, VU by  at 9/19/2022 1611    Comment: Educated pt on fall prevention and to avoid RUE pushing/pulling until A-line removed                   Point: Body mechanics (Not Started)     Description:   Instruct learner(s) on proper positioning and spine alignment during self-care, functional mobility activities and/or exercises.              Learner Progress:  Not documented in this visit.                      User  Key     Initials Effective Dates Name Provider Type Discipline     06/16/21 -  Meliza Rodrigues, OT Occupational Therapist OT                  OT Recommendation and Plan  Planned Therapy Interventions (OT): activity tolerance training, adaptive equipment training, BADL retraining, functional balance retraining, IADL retraining, ROM/therapeutic exercise, strengthening exercise, transfer/mobility retraining, patient/caregiver education/training  Therapy Frequency (OT): other (see comments) (3-7x/wk)  Plan of Care Review  Plan of Care Reviewed With: patient  Progress: improving  Outcome Evaluation: RN okay'd OT bright this date. Pt in the bed upon arrival and agreeable. Ox3. Reports she is a resident of [employer] [facility]. Reports she is IND with mobility and ADLs at baseline. Sup>sit Jason. Educated pt to not push/pull and limit WBing through RUE until A-line removed. Verbalized understanding. Pt adjusted B socks with CG. Jason for sit<>stand. Pt in the bed end of assessment. Needs met and bed alarm on. Pt would benefit from continued skilled OT services to improve IND/safety with ADLs and to improve functional act tolerance. Rec return to SNF at d/c and continued therapy.  Plan of Care Reviewed With: patient  Outcome Evaluation: RN okay'd OT eval this date. Pt in the bed upon arrival and agreeable. Ox3. Reports she is a resident of [employer]. Reports she is IND with mobility and ADLs at baseline. Sup>sit Jason. Educated pt to not push/pull and limit WBing through RUE until A-line removed. Verbalized understanding. Pt adjusted B socks with CG. Jason for sit<>stand. Pt in the bed end of assessment. Needs met and bed alarm on. Pt would benefit from continued skilled OT services to improve IND/safety with ADLs and to improve functional act tolerance. Rec return to SNF at d/c and continued therapy.     Outcome Measures     Row Name 09/19/22 9528             How much help from another is currently needed...    Putting on and taking off  regular lower body clothing? 3  -KH      Bathing (including washing, rinsing, and drying) 3  -KH      Toileting (which includes using toilet bed pan or urinal) 3  -KH      Putting on and taking off regular upper body clothing 3  -KH      Taking care of personal grooming (such as brushing teeth) 4  -KH      Eating meals 4  -KH      AM-PAC 6 Clicks Score (OT) 20  -KH              Functional Assessment    Outcome Measure Options AM-PAC 6 Clicks Daily Activity (OT)  -            User Key  (r) = Recorded By, (t) = Taken By, (c) = Cosigned By    Initials Name Provider Type    Meliza De Los Santos OT Occupational Therapist                Time Calculation:    Time Calculation- OT     Row Name 09/19/22 1614             Time Calculation- OT    OT Start Time 1540  -      OT Stop Time 1605  -      OT Time Calculation (min) 25 min  -      OT Received On 09/19/22  -      OT Goal Re-Cert Due Date 10/02/22  -            User Key  (r) = Recorded By, (t) = Taken By, (c) = Cosigned By    Initials Name Provider Type    Meliza De Los Santos OT Occupational Therapist              Therapy Charges for Today     Code Description Service Date Service Provider Modifiers Qty    [id number] HC OT EVAL MOD COMPLEXITY 2 9/19/2022 Meliza Rodrigues OT GO 1               Meliza Rodrigues OT  9/19/2022

## 2022-09-19 NOTE — PROGRESS NOTES
[facility] Cardiology  INPATIENT PROGRESS NOTE    Name: Magy Barton  Age/Sex: 82 y.o. female  :  1940        PCP: Sean Briones MD          Subjective   No events no complaints patient agreeable to proceed with further revascularization today    Patient Active Problem List   Diagnosis   • Urinary tract infection with hematuria   • Colitis   • Non-STEMI (non-ST elevated myocardial infarction) (HCC)       Past Medical History:   Diagnosis Date   • Cancer (HCC)     leukemia   • Coronary artery disease    • Diabetes mellitus (HCC)    • Hypertension    • Injury of back    • Myocardial infarction (HCC)    • Stroke (HCC)        Current Facility-Administered Medications   Medication Dose Route Frequency Provider Last Rate Last Admin   • [MAR Hold] acetaminophen (TYLENOL) tablet 650 mg  650 mg Oral Q4H PRN Ayaz Wild DO       • amLODIPine (NORVASC) tablet 10 mg  10 mg Oral Nightly Tez Will MD   10 mg at 22   • aspirin EC tablet 81 mg  81 mg Oral Daily Tez Will MD   81 mg at 22 0747   • carBAMazepine (TEGretol) tablet 200 mg  200 mg Oral Q8H Tez Will MD   200 mg at 22 0510   • dextrose (D50W) (25 g/50 mL) IV injection 25 g  25 g Intravenous Q15 Min PRN Tez Will MD       • dextrose (GLUTOSE) oral gel 15 g  15 g Oral Q15 Min PRN Tez Will MD       • glucagon (human recombinant) (GLUCAGEN DIAGNOSTIC) injection 1 mg  1 mg Intramuscular Q15 Min PRN Tez Will MD       • Insulin Aspart (novoLOG) injection 0-7 Units  0-7 Units Subcutaneous TID AC Tez Will MD       • insulin detemir (LEVEMIR) injection 25 Units  25 Units Subcutaneous Nightly Tez Will MD   25 Units at 22   • lisinopril (PRINIVIL,ZESTRIL) tablet 40 mg  40 mg Oral Daily Tez Will MD   40 mg at 22 1251   • metoprolol succinate XL (TOPROL-XL) 24 hr tablet 100 mg  100 mg Oral Nightly Tez Will MD    100 mg at 09/18/22 2009   • nitroglycerin (TRIDIL) 200 mcg/ml infusion  5-200 mcg/min Intravenous Titrated Sean Packer APRN   Stopped at 09/19/22 0200   • [MAR Hold] ondansetron (ZOFRAN) injection 4 mg  4 mg Intravenous Q6H PRN Sean Packer APRN   4 mg at 09/18/22 1627   • [MAR Hold] rosuvastatin (CRESTOR) tablet 20 mg  20 mg Oral Nightly Shamai, Ayaz, DO   20 mg at 09/18/22 2112   • sodium chloride 0.9 % flush 10 mL  10 mL Intravenous PRN Tez Will MD       • sodium chloride 0.9 % flush 10 mL  10 mL Intravenous PRN Shamai, Ayaz, DO       • sodium chloride 0.9 % flush 3 mL  3 mL Intravenous Q12H Shamai, Ayaz, DO   3 mL at 09/18/22 2118   • sodium chloride 0.9 % infusion  100 mL/hr Intravenous Continuous Shamai, Ayaz,  mL/hr at 09/18/22 2321 100 mL/hr at 09/18/22 2321   • [MAR Hold] ticagrelor (BRILINTA) tablet 90 mg  90 mg Oral BID Shamai, Ayaz, DO   90 mg at 09/19/22 0748   • venlafaxine (EFFEXOR) tablet 37.5 mg  37.5 mg Oral Daily Tez Will MD   37.5 mg at 09/18/22 1251       Past Surgical History:   Procedure Laterality Date   • CATARACT EXTRACTION     • TRIGEMINAL NERVE DECOMPRESSION     • TUBAL ABDOMINAL LIGATION     • UVULOPALATOPHARYNGOPLASTY         Social History     Socioeconomic History   • Marital status:    Tobacco Use   • Smoking status: Never Smoker   • Smokeless tobacco: Never Used   Substance and Sexual Activity   • Alcohol use: No   • Drug use: No   • Sexual activity: Never       Vital Signs  Temp:  [97 °F (36.1 °C)-99.3 °F (37.4 °C)] 98.4 °F (36.9 °C)  Heart Rate:  [] 80  Resp:  [12-20] 16  BP: (124-192)/() 163/76  Body mass index is 33.35 kg/m².    P/E    General: No acute distress  Neck: Supple.  No JVD, no thyroid enlargement.  Chest: Air entry equal, normal respiration.  No rhonchi or creps.  Cardiovascular system:  Regular rate and rhythm, no murmurs.  Abdomen: Soft, no tenderness, bowel sounds present, no  hepatosplenomegaly.  CNS: Alert, oriented to place and time.  No motor or sensory deficit.  Cranial nerves intact.  Musculoskeletal: No deformity of the back or spine.  Extremities:  No edema.  Pulses equal on both sides.  Right arm bruised      Lab Results (last 24 hours)     Procedure Component Value Units Date/Time    POC Glucose Once [[id number]]  (Abnormal) Collected: 09/18/22 1217    Specimen: Blood Updated: 09/18/22 1602     Glucose 144 mg/dL      Comment: RN NotifiedOperator: [id number] DWAYNE Luna ID: [id number]       Troponin [[id number]]  (Abnormal) Collected: 09/18/22 1333    Specimen: Blood Updated: 09/18/22 1407     Troponin T 10.910 ng/mL     Narrative:      Troponin T Reference Range:  <= 0.03 ng/mL-   Negative for AMI  >0.03 ng/mL-     Abnormal for myocardial necrosis.  Clinicians would have to utilize clinical acumen, EKG, Troponin and serial changes to determine if it is an Acute Myocardial Infarction or myocardial injury due to an underlying chronic condition.       Results may be falsely decreased if patient taking Biotin.      CBC & Differential [[id number]]  (Abnormal) Collected: 09/18/22 1634    Specimen: Blood Updated: 09/18/22 1643    Narrative:      The following orders were created for panel order CBC & Differential.  Procedure                               Abnormality         Status                     ---------                               -----------         ------                     CBC Auto Differential[[id number]]        Abnormal            Final result                 Please view results for these tests on the individual orders.    Basic Metabolic Panel [[id number]]  (Abnormal) Collected: 09/18/22 1634    Specimen: Blood Updated: 09/18/22 1712     Glucose 120 mg/dL      BUN 26 mg/dL      Creatinine 1.27 mg/dL      Sodium 140 mmol/L      Potassium 4.1 mmol/L      Comment: Slight hemolysis detected by analyzer. Results may be affected.        Chloride 101 mmol/L      CO2 28.0  mmol/L      Calcium 8.9 mg/dL      BUN/Creatinine Ratio 20.5     Anion Gap 11.0 mmol/L      eGFR 42.3 mL/min/1.73      Comment: National Kidney Foundation and American Society of Nephrology (ASN) Task Force recommended calculation based on the Chronic Kidney Disease Epidemiology Collaboration (CKD-EPI) equation refit without adjustment for race.       Narrative:      GFR Normal >60  Chronic Kidney Disease <60  Kidney Failure <15      CBC Auto Differential [[id number]]  (Abnormal) Collected: 09/18/22 1634    Specimen: Blood Updated: 09/18/22 1643     WBC 13.73 10*3/mm3      RBC 4.39 10*6/mm3      Hemoglobin 13.3 g/dL      Hematocrit 40.5 %      MCV 92.3 fL      MCH 30.3 pg      MCHC 32.8 g/dL      RDW 12.8 %      RDW-SD 42.5 fl      MPV 8.7 fL      Platelets 266 10*3/mm3      Neutrophil % 72.7 %      Lymphocyte % 15.7 %      Monocyte % 10.2 %      Eosinophil % 0.2 %      Basophil % 0.5 %      Immature Grans % 0.7 %      Neutrophils, Absolute 9.97 10*3/mm3      Lymphocytes, Absolute 2.16 10*3/mm3      Monocytes, Absolute 1.40 10*3/mm3      Eosinophils, Absolute 0.03 10*3/mm3      Basophils, Absolute 0.07 10*3/mm3      Immature Grans, Absolute 0.10 10*3/mm3      nRBC 0.0 /100 WBC     POC Glucose Once [[id number]]  (Normal) Collected: 09/18/22 1708    Specimen: Blood Updated: 09/18/22 1743     Glucose 113 mg/dL      Comment: RN NotifiedOperator: [id number] DWAYNE MEDEIROSMeter ID: [id number]       POC Glucose Once [[id number]]  (Abnormal) Collected: 09/18/22 2110    Specimen: Blood Updated: 09/18/22 2133     Glucose 185 mg/dL      Comment: : [id number] LATHA WRENDYMeter ID: [id number]       POC Glucose Once [[id number]]  (Abnormal) Collected: 09/19/22 0508    Specimen: Blood Updated: 09/19/22 0523     Glucose 132 mg/dL      Comment: : [id number] LATHA WRENDYMeter ID: [id number]       Basic Metabolic Panel [[id number]]  (Abnormal) Collected: 09/19/22 0528    Specimen: Blood Updated: 09/19/22 0618     Glucose 126  mg/dL      BUN 24 mg/dL      Creatinine 1.25 mg/dL      Sodium 141 mmol/L      Potassium 3.6 mmol/L      Chloride 103 mmol/L      CO2 28.0 mmol/L      Calcium 8.3 mg/dL      BUN/Creatinine Ratio 19.2     Anion Gap 10.0 mmol/L      eGFR 43.1 mL/min/1.73      Comment: National Kidney Foundation and American Society of Nephrology (ASN) Task Force recommended calculation based on the Chronic Kidney Disease Epidemiology Collaboration (CKD-EPI) equation refit without adjustment for race.       Narrative:      GFR Normal >60  Chronic Kidney Disease <60  Kidney Failure <15      Magnesium [[id number]]  (Normal) Collected: 09/19/22 0528    Specimen: Blood Updated: 09/19/22 0618     Magnesium 1.9 mg/dL     CBC & Differential [[id number]]  (Abnormal) Collected: 09/19/22 0528    Specimen: Blood Updated: 09/19/22 0559    Narrative:      The following orders were created for panel order CBC & Differential.  Procedure                               Abnormality         Status                     ---------                               -----------         ------                     CBC Auto Differential[[id number]]        Abnormal            Final result                 Please view results for these tests on the individual orders.    CBC Auto Differential [[id number]]  (Abnormal) Collected: 09/19/22 0528    Specimen: Blood Updated: 09/19/22 0559     WBC 13.01 10*3/mm3      RBC 3.67 10*6/mm3      Hemoglobin 11.2 g/dL      Hematocrit 32.9 %      MCV 89.6 fL      MCH 30.5 pg      MCHC 34.0 g/dL      RDW 12.9 %      RDW-SD 41.7 fl      MPV 8.9 fL      Platelets 233 10*3/mm3      Neutrophil % 69.1 %      Lymphocyte % 17.1 %      Monocyte % 12.0 %      Eosinophil % 0.4 %      Basophil % 0.6 %      Immature Grans % 0.8 %      Neutrophils, Absolute 8.99 10*3/mm3      Lymphocytes, Absolute 2.22 10*3/mm3      Monocytes, Absolute 1.56 10*3/mm3      Eosinophils, Absolute 0.05 10*3/mm3      Basophils, Absolute 0.08 10*3/mm3      Immature Grans,  Absolute 0.11 10*3/mm3      nRBC 0.0 /100 WBC             A/P  1. STEMI/NSTEMI patient was percutaneously intervened multivessel's circumflex LAD and RCA on 918 and 919  Doing well safe to go to floor plans for discharge in a.m.  2 diabetes uncontrolled hemoglobin A1c is noted defer to primary  3 acute kidney injury on chronic kidney disease suspected or secondary to diabetes creatinine is stable after 2 interventions  3 obesity  4 hypertensive emergency nonresponsive to Lopressor and labetalol we will continue to titrate blood pressure medications to improve  Thank you for allowing me to participate in this patient's care we will continue to follow  Ayaz Wild DO        This document has been electronically signed by Ayaz Wild DO on September 19, 2022 09:21 CDT         Part of this note may be an electronic transcription/translation of spoken language to printed text using the Dragon Dictation System.

## 2022-09-19 NOTE — PLAN OF CARE
Problem: Adult Inpatient Plan of Care  Goal: Plan of Care Review  Flowsheets  Taken 9/19/2022 1612  Plan of Care Reviewed With: patient  Outcome Evaluation: RN okay'd OT bright this date. Pt in the bed upon arrival and agreeable. Ox3. Reports she is a resident of [place]. Reports she is IND with mobility and ADLs at baseline. Sup>sit Jason. Educated pt to not push/pull and limit WBing through RUE until A-line removed. Verbalized understanding. Pt adjusted B socks with CG. Jason for sit<>stand. Pt in the bed end of assessment. Needs met and bed alarm on. Pt would benefit from continued skilled OT services to improve IND/safety with ADLs and to improve functional act tolerance. Rec return to SNF at d/c and continued therapy.

## 2022-09-19 NOTE — PROGRESS NOTES
[facility] Medicine Services  INPATIENT PROGRESS NOTE    Length of Stay: 1  Date of Admission: 9/18/2022  Primary Care Physician: Sean Briones MD    Subjective   Chief Complaint: Chest pain  HPI:  82 year old female with a  History of CAD, DM, HTN, HLD who presented with chest pain.  She was found to have ST depressions on EKG and elevated troponin.  Cardiology consulted and she underwent LHC which found multiple lesions.  She underwent PCI of the OM and circumflex on 9/18 and PCI to the RCA today.  She is chest pain free. She had vomiting yesterday but denies nausea this morning.     Review of Systems   Constitutional: Negative for chills and fever.   Respiratory: Negative for shortness of breath.    Cardiovascular: Negative for chest pain.   Gastrointestinal: Negative for abdominal pain, nausea and vomiting.        All pertinent negatives and positives are as above. All other systems have been reviewed and are negative unless otherwise stated.     Objective    Temp:  [97 °F (36.1 °C)-99.3 °F (37.4 °C)] 98 °F (36.7 °C)  Heart Rate:  [] 86  Resp:  [12-20] 15  BP: (124-192)/() 168/89    AM-PAC 6 Clicks Score (PT): 18 (09/19/22 1030)    As of today, 9/19/22  Physical Exam  Vitals reviewed.   Constitutional:       General: She is not in acute distress.     Appearance: Normal appearance.   HENT:      Head: Normocephalic and atraumatic.   Cardiovascular:      Rate and Rhythm: Normal rate and regular rhythm.   Pulmonary:      Effort: Pulmonary effort is normal. No respiratory distress.      Breath sounds: Normal breath sounds.   Abdominal:      General: There is no distension.      Palpations: Abdomen is soft.      Tenderness: There is no abdominal tenderness.   Musculoskeletal:         General: No swelling or deformity.      Comments: TR band right wrist   Skin:     General: Skin is warm and dry.   Neurological:      General: No focal deficit present.       Mental Status: She is alert and oriented to person, place, and time.         Results Review:  I have reviewed the labs, radiology results, and diagnostic studies.    Laboratory Data:   Results from last 7 days   Lab Units 09/19/22  0528 09/18/22  1634 09/18/22  0626   SODIUM mmol/L 141 140 140   POTASSIUM mmol/L 3.6 4.1 3.7   CHLORIDE mmol/L 103 101 102   CO2 mmol/L 28.0 28.0 30.0*   BUN mg/dL 24* 26* 23   CREATININE mg/dL 1.25* 1.27* 1.20*   GLUCOSE mg/dL 126* 120* 120*   CALCIUM mg/dL 8.3* 8.9 9.2   BILIRUBIN mg/dL  --   --  0.2   ALK PHOS U/L  --   --  147*   ALT (SGPT) U/L  --   --  16   AST (SGOT) U/L  --   --  54*   ANION GAP mmol/L 10.0 11.0 8.0     Estimated Creatinine Clearance: 34.6 mL/min (A) (by C-G formula based on SCr of 1.25 mg/dL (H)).  Results from last 7 days   Lab Units 09/19/22  0528 09/18/22  0626   MAGNESIUM mg/dL 1.9 2.1         Results from last 7 days   Lab Units 09/19/22  0528 09/18/22  1634 09/18/22  0626   WBC 10*3/mm3 13.01* 13.73* 11.19*   HEMOGLOBIN g/dL 11.2* 13.3 12.9   HEMATOCRIT % 32.9* 40.5 38.9   PLATELETS 10*3/mm3 233 266 272     Results from last 7 days   Lab Units 09/18/22  0626   INR  1.04       Culture Data:   No results found for: BLOODCX  No results found for: URINECX  No results found for: RESPCX  No results found for: WOUNDCX  No results found for: STOOLCX  No components found for: BODYFLD    Radiology Data:   Imaging Results (Last 24 Hours)     ** No results found for the last 24 hours. **          I have reviewed the patient's current medications.     Assessment/Plan     Active Hospital Problems    Diagnosis    • Non-STEMI (non-ST elevated myocardial infarction) (HCC)    DMII  HTN  CAD    Plan:    S/P LHC, PCI to Cx and OM on 9/18, PCI RCA on 9/19  Cardiology consultation appreciated  Aspirin, Brilinta  Statin  IV fluid:  ml/hr  BMP in AM  Glucose control: Levemir, SSI  BP control: Coreg 12.5 mg BID, Lisinopril 40 mg daily, Norvasc 10 mg nightly  PT/OT  VTE  PPx: SCD  Discharge planning: will need cardiac rehab    I confirmed that the patient's Advance Care Plan is present, code status is documented, or surrogate decision maker is listed in the patient's medical record.     The patient was evaluated during the global COVID-19 pandemic, and the diagnosis was suspected/considered upon their initial presentation.  Evaluation, treatment, and testing were consistent with current guidelines for patients who present with complaints or symptoms that may be related to COVID-19.          This document has been electronically signed by MIKA Bonilla on September 19, 2022 11:27 CDT

## 2022-09-19 NOTE — PLAN OF CARE
Goal Outcome Evaluation:  Plan of Care Reviewed With: patient        Progress: improving  Outcome Evaluation: Pt understands she will return to cath lab in a.m. for right side heart cath on RCA.  Problem: Adult Inpatient Plan of Care  Goal: Plan of Care Review  Outcome: Ongoing, Progressing  Flowsheets (Taken 9/18/2022 7822)  Progress: improving  Plan of Care Reviewed With: patient  Outcome Evaluation: Pt understands she will return to cath lab in a.m. for right side heart cath on RCA.

## 2022-09-20 ENCOUNTER — READMISSION MANAGEMENT (OUTPATIENT)
Dept: CALL CENTER | Facility: HOSPITAL | Age: 82
End: 2022-09-20

## 2022-09-20 ENCOUNTER — DOCUMENTATION (OUTPATIENT)
Dept: CARDIAC REHAB | Facility: HOSPITAL | Age: 82
End: 2022-09-20

## 2022-09-20 VITALS
HEART RATE: 86 BPM | OXYGEN SATURATION: 92 % | HEIGHT: 62 IN | BODY MASS INDEX: 33.55 KG/M2 | WEIGHT: 182.32 LBS | DIASTOLIC BLOOD PRESSURE: 67 MMHG | SYSTOLIC BLOOD PRESSURE: 134 MMHG | RESPIRATION RATE: 16 BRPM | TEMPERATURE: 98 F

## 2022-09-20 LAB
ANION GAP SERPL CALCULATED.3IONS-SCNC: 11 MMOL/L (ref 5–15)
BASOPHILS # BLD AUTO: 0.1 10*3/MM3 (ref 0–0.2)
BASOPHILS NFR BLD AUTO: 0.7 % (ref 0–1.5)
BUN SERPL-MCNC: 17 MG/DL (ref 8–23)
BUN/CREAT SERPL: 17.7 (ref 7–25)
CALCIUM SPEC-SCNC: 8 MG/DL (ref 8.6–10.5)
CHLORIDE SERPL-SCNC: 106 MMOL/L (ref 98–107)
CO2 SERPL-SCNC: 23 MMOL/L (ref 22–29)
CREAT SERPL-MCNC: 0.96 MG/DL (ref 0.57–1)
DEPRECATED RDW RBC AUTO: 44.5 FL (ref 37–54)
EGFRCR SERPLBLD CKD-EPI 2021: 59.2 ML/MIN/1.73
EOSINOPHIL # BLD AUTO: 0.12 10*3/MM3 (ref 0–0.4)
EOSINOPHIL NFR BLD AUTO: 0.8 % (ref 0.3–6.2)
ERYTHROCYTE [DISTWIDTH] IN BLOOD BY AUTOMATED COUNT: 12.9 % (ref 12.3–15.4)
FLUAV RNA RESP QL NAA+PROBE: NOT DETECTED
FLUBV RNA RESP QL NAA+PROBE: NOT DETECTED
GLUCOSE BLDC GLUCOMTR-MCNC: 112 MG/DL (ref 70–130)
GLUCOSE BLDC GLUCOMTR-MCNC: 127 MG/DL (ref 70–130)
GLUCOSE SERPL-MCNC: 111 MG/DL (ref 65–99)
HCT VFR BLD AUTO: 33.2 % (ref 34–46.6)
HGB BLD-MCNC: 10.6 G/DL (ref 12–15.9)
IMM GRANULOCYTES # BLD AUTO: 0.13 10*3/MM3 (ref 0–0.05)
IMM GRANULOCYTES NFR BLD AUTO: 0.9 % (ref 0–0.5)
LYMPHOCYTES # BLD AUTO: 2.11 10*3/MM3 (ref 0.7–3.1)
LYMPHOCYTES NFR BLD AUTO: 14.1 % (ref 19.6–45.3)
MAGNESIUM SERPL-MCNC: 1.8 MG/DL (ref 1.6–2.4)
MCH RBC QN AUTO: 30.4 PG (ref 26.6–33)
MCHC RBC AUTO-ENTMCNC: 31.9 G/DL (ref 31.5–35.7)
MCV RBC AUTO: 95.1 FL (ref 79–97)
MONOCYTES # BLD AUTO: 1.36 10*3/MM3 (ref 0.1–0.9)
MONOCYTES NFR BLD AUTO: 9.1 % (ref 5–12)
NEUTROPHILS NFR BLD AUTO: 11.19 10*3/MM3 (ref 1.7–7)
NEUTROPHILS NFR BLD AUTO: 74.4 % (ref 42.7–76)
NRBC BLD AUTO-RTO: 0 /100 WBC (ref 0–0.2)
PLATELET # BLD AUTO: 211 10*3/MM3 (ref 140–450)
PMV BLD AUTO: 9.3 FL (ref 6–12)
POTASSIUM SERPL-SCNC: 3.8 MMOL/L (ref 3.5–5.2)
RBC # BLD AUTO: 3.49 10*6/MM3 (ref 3.77–5.28)
SARS-COV-2 RNA RESP QL NAA+PROBE: NOT DETECTED
SODIUM SERPL-SCNC: 140 MMOL/L (ref 136–145)
WBC NRBC COR # BLD: 15.01 10*3/MM3 (ref 3.4–10.8)

## 2022-09-20 PROCEDURE — 87636 SARSCOV2 & INF A&B AMP PRB: CPT | Performed by: FAMILY MEDICINE

## 2022-09-20 PROCEDURE — 97110 THERAPEUTIC EXERCISES: CPT

## 2022-09-20 PROCEDURE — 97530 THERAPEUTIC ACTIVITIES: CPT

## 2022-09-20 PROCEDURE — 80048 BASIC METABOLIC PNL TOTAL CA: CPT | Performed by: FAMILY MEDICINE

## 2022-09-20 PROCEDURE — 85025 COMPLETE CBC W/AUTO DIFF WBC: CPT | Performed by: FAMILY MEDICINE

## 2022-09-20 PROCEDURE — 99238 HOSP IP/OBS DSCHRG MGMT 30/<: CPT | Performed by: INTERNAL MEDICINE

## 2022-09-20 PROCEDURE — 82962 GLUCOSE BLOOD TEST: CPT

## 2022-09-20 PROCEDURE — 83735 ASSAY OF MAGNESIUM: CPT | Performed by: FAMILY MEDICINE

## 2022-09-20 RX ORDER — CARVEDILOL 12.5 MG/1
12.5 TABLET ORAL 2 TIMES DAILY WITH MEALS
Qty: 60 TABLET | Refills: 5 | Status: SHIPPED | OUTPATIENT
Start: 2022-09-20

## 2022-09-20 RX ORDER — ROSUVASTATIN CALCIUM 20 MG/1
20 TABLET, COATED ORAL NIGHTLY
Qty: 90 TABLET | Refills: 5 | Status: SHIPPED | OUTPATIENT
Start: 2022-09-20

## 2022-09-20 RX ADMIN — TICAGRELOR 90 MG: 90 TABLET ORAL at 09:39

## 2022-09-20 RX ADMIN — CARBAMAZEPINE 200 MG: 200 TABLET ORAL at 05:41

## 2022-09-20 RX ADMIN — LISINOPRIL 40 MG: 40 TABLET ORAL at 09:39

## 2022-09-20 RX ADMIN — CARBAMAZEPINE 200 MG: 200 TABLET ORAL at 15:36

## 2022-09-20 RX ADMIN — ASPIRIN 81 MG: 81 TABLET, FILM COATED ORAL at 09:39

## 2022-09-20 RX ADMIN — VENLAFAXINE 37.5 MG: 37.5 TABLET ORAL at 09:39

## 2022-09-20 RX ADMIN — CARVEDILOL 12.5 MG: 12.5 TABLET, FILM COATED ORAL at 09:39

## 2022-09-20 NOTE — DISCHARGE INSTR - ACTIVITY
As tolerated. Limited use of upper extremeties. No heavy lifting, no pulling until seen by cardiology

## 2022-09-20 NOTE — DISCHARGE SUMMARY
[facility] Cardiology  INPATIENT DISCHARGE SUMMARY    Date of Discharge:  9/20/2022  Admit DX   STEMI    Discharge Diagnosis:   STEMI status post multivessel intervention to LAD and RCA  Diabetes  Acute on chronic kidney disease on presentation resolved  Anemia  Obesity  Hypertensive emergency      Hospital Course  Patient is a 82 y.o. female presented with acute chest discomfort and hypertensive emergency taken to the Cath Lab emergently where he was revascularized and later on stage in the admission with no complications.      Procedures Performed  Procedure(s):  PERCUTANEOUS CORONARY INTERVENTION       Consults:   Consults     Date and Time Order Name Status Description    9/18/2022 12:05 PM Inpatient Cardiology Consult            Pertinent Test Results:     Lab Results (last 24 hours)     Procedure Component Value Units Date/Time    Basic Metabolic Panel [[id number]]  (Abnormal) Collected: 09/20/22 0607    Specimen: Blood Updated: 09/20/22 0715     Glucose 111 mg/dL      BUN 17 mg/dL      Creatinine 0.96 mg/dL      Sodium 140 mmol/L      Potassium 3.8 mmol/L      Comment: Slight hemolysis detected by analyzer. Results may be affected.        Chloride 106 mmol/L      CO2 23.0 mmol/L      Calcium 8.0 mg/dL      BUN/Creatinine Ratio 17.7     Anion Gap 11.0 mmol/L      eGFR 59.2 mL/min/1.73      Comment: National Kidney Foundation and American Society of Nephrology (ASN) Task Force recommended calculation based on the Chronic Kidney Disease Epidemiology Collaboration (CKD-EPI) equation refit without adjustment for race.       Narrative:      GFR Normal >60  Chronic Kidney Disease <60  Kidney Failure <15      Magnesium [[id number]]  (Normal) Collected: 09/20/22 0607    Specimen: Blood Updated: 09/20/22 0715     Magnesium 1.8 mg/dL     CBC & Differential [[id number]]  (Abnormal) Collected: 09/20/22 0606    Specimen: Blood Updated: 09/20/22 0656    Narrative:      The following orders were created for  panel order CBC & Differential.  Procedure                               Abnormality         Status                     ---------                               -----------         ------                     CBC Auto Differential[[id number]]        Abnormal            Final result                 Please view results for these tests on the individual orders.    CBC Auto Differential [[id number]]  (Abnormal) Collected: 09/20/22 0606    Specimen: Blood Updated: 09/20/22 0656     WBC 15.01 10*3/mm3      RBC 3.49 10*6/mm3      Hemoglobin 10.6 g/dL      Hematocrit 33.2 %      MCV 95.1 fL      MCH 30.4 pg      MCHC 31.9 g/dL      RDW 12.9 %      RDW-SD 44.5 fl      MPV 9.3 fL      Platelets 211 10*3/mm3      Neutrophil % 74.4 %      Lymphocyte % 14.1 %      Monocyte % 9.1 %      Eosinophil % 0.8 %      Basophil % 0.7 %      Immature Grans % 0.9 %      Neutrophils, Absolute 11.19 10*3/mm3      Lymphocytes, Absolute 2.11 10*3/mm3      Monocytes, Absolute 1.36 10*3/mm3      Eosinophils, Absolute 0.12 10*3/mm3      Basophils, Absolute 0.10 10*3/mm3      Immature Grans, Absolute 0.13 10*3/mm3      nRBC 0.0 /100 WBC     POC Glucose Once [[id number]]  (Normal) Collected: 09/19/22 1134    Specimen: Blood Updated: 09/20/22 0458     Glucose 127 mg/dL      Comment: RN NotifiedOperator: [id number] [employer] AUSTINMeter ID: [id number]       POC Glucose Once [[id number]]  (Normal) Collected: 09/19/22 2042    Specimen: Blood Updated: 09/19/22 2055     Glucose 93 mg/dL      Comment: : [id number] DHAVAL VALLADARESMeter ID: [id number]       POC Glucose Once [[id number]]  (Normal) Collected: 09/19/22 1721    Specimen: Blood Updated: 09/19/22 1749     Glucose 101 mg/dL      Comment: : [id number] REDDEN AUSTINMeter ID: [id number]             Imaging Results (Last 24 Hours)     ** No results found for the last 24 hours. **          ECG/EMG Results (last 24 hours)     Procedure Component Value Units Date/Time    ECG 12 Lead  [[id number]] Collected: 09/18/22 1830     Updated: 09/19/22 1049     QT Interval 424 ms      QTC Interval 492 ms     Narrative:      Test Reason : n/v post cath  Blood Pressure :   */*   mmHG  Vent. Rate :  81 BPM     Atrial Rate :  81 BPM     P-R Int : 166 ms          QRS Dur : 120 ms      QT Int : 424 ms       P-R-T Axes :  61  62 116 degrees     QTc Int : 492 ms    Normal sinus rhythm  Nonspecific intraventricular conduction delay  Nonspecific ST and T wave abnormality  Abnormal ECG  When compared with ECG of 18-SEP-2022 07:03,  ST now depressed in Anterior leads  Nonspecific T wave abnormality now evident in Inferior leads  Nonspecific T wave abnormality, worse in Lateral leads    Referred By:            Confirmed By: LADONNA SWAN    SCANNED EKG [[id number]] Resulted: 09/18/22     Updated: 09/19/22 1158    SCANNED EKG [[id number]] Resulted: 09/18/22     Updated: 09/19/22 1158    SCANNED EKG [[id number]] Resulted: 09/18/22     Updated: 09/19/22 1242    SCANNED EKG [[id number]] Resulted: 09/18/22     Updated: 09/19/22 1243          Condition on Discharge:  stable    Vital Signs  Temp:  [98 °F (36.7 °C)-98.1 °F (36.7 °C)] 98 °F (36.7 °C)  Heart Rate:  [70-94] 85  Resp:  [14-16] 16  BP: (138-184)/(67-96) 174/79    AAOX3 NAD  Respiratory clear to auscultation  Cardiac regular rate and rhythm no murmurs gallops or rubs  Abdomen soft nontender nondistended  Cath clean site tender clean not swollen         Your medication list      START taking these medications      Instructions Last Dose Given Next Dose Due   carvedilol 12.5 MG tablet  Commonly known as: COREG      Take 1 tablet by mouth 2 (Two) Times a Day With Meals.       rosuvastatin 20 MG tablet  Commonly known as: CRESTOR      Take 1 tablet by mouth Every Night.       ticagrelor 90 MG tablet tablet  Commonly known as: BRILINTA      Take 1 tablet by mouth 2 (Two) Times a Day.          CONTINUE taking these medications      Instructions Last Dose Given Next Dose Due    amLODIPine 10 MG tablet  Commonly known as: NORVASC      Take 10 mg by mouth Every Night.       ASPIR-81 PO      Take 81 mg by mouth Daily.       carBAMazepine 200 MG tablet  Commonly known as: TEGretol      Take 200 mg by mouth 2 (Two) Times a Day.       Insulin Glargine 100 UNIT/ML injection pen  Commonly known as: LANTUS SOLOSTAR      Inject 28 Units under the skin Every Night.       lisinopril 40 MG tablet  Commonly known as: PRINIVIL,ZESTRIL      Take 40 mg by mouth Daily.       melatonin 3 MG tablet      Take 3 mg by mouth Every Night. For insomnia       Myrbetriq 50 MG tablet sustained-release 24 hour 24 hr tablet  Generic drug: Mirabegron ER      Take 50 mg by mouth Daily.       venlafaxine 37.5 MG tablet  Commonly known as: EFFEXOR      Take 37.5 mg by mouth 2 (Two) Times a Day.          STOP taking these medications    clopidogrel 75 MG tablet  Commonly known as: PLAVIX        fluticasone 50 MCG/ACT nasal spray  Commonly known as: FLONASE        metoprolol succinate  MG 24 hr tablet  Commonly known as: TOPROL-XL        ondansetron ODT 4 MG disintegrating tablet  Commonly known as: Zofran ODT        pravastatin 20 MG tablet  Commonly known as: PRAVACHOL              Where to Get Your Medications      These medications were sent to [facility] - [place], [place] - [address] [place] [address] [phone]  - [phone] [place] [id number]    Phone: [phone]   · carvedilol 12.5 MG tablet  · rosuvastatin 20 MG tablet  · ticagrelor 90 MG tablet tablet             Discharge Disposition  Home or Self CareHome    Discharge Medications  See attached    Discharge Diet:   Cardiac diet  Activity at Discharge: as tolerated    Follow-up Appointments  Cardiology office in 30 days  Additional Instructions for the Follow-ups that You Need to Schedule     Ambulatory Referral to Cardiac Rehab   As directed            Ayaz Wild DO      Part of this note may be an electronic  transcription/translation of spoken language to printed text using the Dragon Dictation System.

## 2022-09-20 NOTE — PROGRESS NOTES
[facility] Medicine Services  INPATIENT PROGRESS NOTE    Length of Stay: 2  Date of Admission: 9/18/2022  Primary Care Physician: Sean Briones MD    Subjective   Chief Complaint: Chest pain  HPI:  82 year old female with a  History of CAD, DM, HTN, HLD who presented with chest pain.  She was found to have ST depressions on EKG and elevated troponin.  Cardiology consulted and she underwent LHC which found multiple lesions.  She underwent PCI of the OM and circumflex on 9/18 and PCI to the RCA today.  No new complaints or overnight events.     Review of Systems   Constitutional: Negative for chills and fever.   Respiratory: Negative for shortness of breath.    Cardiovascular: Negative for chest pain.   Gastrointestinal: Negative for abdominal pain, nausea and vomiting.        All pertinent negatives and positives are as above. All other systems have been reviewed and are negative unless otherwise stated.     Objective    Temp:  [98 °F (36.7 °C)-98.1 °F (36.7 °C)] 98 °F (36.7 °C)  Heart Rate:  [70-94] 85  Resp:  [14-16] 16  BP: (138-184)/(67-96) 174/79    AM-PAC 6 Clicks Score (PT): 18 (09/19/22 1030)    As of today, 9/20/22  Physical Exam  Vitals reviewed.   Constitutional:       General: She is not in acute distress.     Appearance: Normal appearance.   HENT:      Head: Normocephalic and atraumatic.   Cardiovascular:      Rate and Rhythm: Normal rate and regular rhythm.   Pulmonary:      Effort: Pulmonary effort is normal. No respiratory distress.      Breath sounds: Normal breath sounds.   Abdominal:      General: There is no distension.      Palpations: Abdomen is soft.      Tenderness: There is no abdominal tenderness.   Musculoskeletal:         General: No swelling or deformity.      Comments: TR band is off   Skin:     General: Skin is warm and dry.      Findings: Bruising (mild right wrist) present.   Neurological:      General: No focal deficit  present.      Mental Status: She is alert and oriented to person, place, and time.         Results Review:  I have reviewed the labs, radiology results, and diagnostic studies.    Laboratory Data:   Results from last 7 days   Lab Units 09/20/22  0607 09/19/22  0528 09/18/22  1634 09/18/22  0626   SODIUM mmol/L 140 141 140 140   POTASSIUM mmol/L 3.8 3.6 4.1 3.7   CHLORIDE mmol/L 106 103 101 102   CO2 mmol/L 23.0 28.0 28.0 30.0*   BUN mg/dL 17 24* 26* 23   CREATININE mg/dL 0.96 1.25* 1.27* 1.20*   GLUCOSE mg/dL 111* 126* 120* 120*   CALCIUM mg/dL 8.0* 8.3* 8.9 9.2   BILIRUBIN mg/dL  --   --   --  0.2   ALK PHOS U/L  --   --   --  147*   ALT (SGPT) U/L  --   --   --  16   AST (SGOT) U/L  --   --   --  54*   ANION GAP mmol/L 11.0 10.0 11.0 8.0     Estimated Creatinine Clearance: 45 mL/min (by C-G formula based on SCr of 0.96 mg/dL).  Results from last 7 days   Lab Units 09/20/22  0607 09/19/22  0528 09/18/22  0626   MAGNESIUM mg/dL 1.8 1.9 2.1         Results from last 7 days   Lab Units 09/20/22  0606 09/19/22  0528 09/18/22  1634 09/18/22  0626   WBC 10*3/mm3 15.01* 13.01* 13.73* 11.19*   HEMOGLOBIN g/dL 10.6* 11.2* 13.3 12.9   HEMATOCRIT % 33.2* 32.9* 40.5 38.9   PLATELETS 10*3/mm3 211 233 266 272     Results from last 7 days   Lab Units 09/18/22  0626   INR  1.04       Culture Data:   No results found for: BLOODCX  No results found for: URINECX  No results found for: RESPCX  No results found for: WOUNDCX  No results found for: STOOLCX  No components found for: BODYFLD    Radiology Data:   Imaging Results (Last 24 Hours)     ** No results found for the last 24 hours. **          I have reviewed the patient's current medications.     Assessment/Plan     Active Hospital Problems    Diagnosis    • Non-STEMI (non-ST elevated myocardial infarction) (HCC)    DMII  HTN  CAD    Plan:    S/P LHC, PCI to Cx and OM on 9/18, PCI RCA on 9/19  Cardiology consultation appreciated  Aspirin, Brilinta  Statin  Glucose control:  Levemir, CASSIA  BP control: Coreg 12.5 mg BID, Lisinopril 40 mg daily, Norvasc 10 mg nightly  PT/OT  VTE PPx: SCD  Discharge planning: COVID screen for SNF    I confirmed that the patient's Advance Care Plan is present, code status is documented, or surrogate decision maker is listed in the patient's medical record.     The patient was evaluated during the global COVID-19 pandemic, and the diagnosis was suspected/considered upon their initial presentation.  Evaluation, treatment, and testing were consistent with current guidelines for patients who present with complaints or symptoms that may be related to COVID-19.          This document has been electronically signed by MIKA Bonilla on September 20, 2022 09:36 CDT

## 2022-09-20 NOTE — PLAN OF CARE
Goal Outcome Evaluation:  Plan of Care Reviewed With: patient           Outcome Evaluation: sup-sit-stand min of 1,stand-sit cga of 1;bed-chair with rw and min of 1~2'

## 2022-09-20 NOTE — OUTREACH NOTE
Prep Survey    Flowsheet Row Responses   [facility] facility patient discharged from? [facility]   Is LACE score < 7 ? Yes   Emergency Room discharge w/ pulse ox? No   Eligibility Not Eligible   What are the reasons patient is not eligible? [facility]   Does the patient have one of the following disease processes/diagnoses(primary or secondary)? Acute MI (STEMI,NSTEMI)   Prep survey completed? Yes          DANITZA RAM - Registered Nurse

## 2022-09-20 NOTE — THERAPY TREATMENT NOTE
Acute Care - Physical Therapy Treatment Note  [facility]     Patient Name: Magy Barton  : 1940  MRN: [id number]  Today's Date: 2022   Onset of Illness/Injury or Date of Surgery: 22  Visit Dx:     ICD-10-CM ICD-9-CM   1. Non-STEMI (non-ST elevated myocardial infarction) (HCC)  I21.4 410.70   2. Impaired functional mobility, balance, gait, and endurance  Z74.09 V49.89   3. Impaired mobility and ADLs  Z74.09 V49.89    Z78.9      Patient Active Problem List   Diagnosis   • Urinary tract infection with hematuria   • Colitis   • Non-STEMI (non-ST elevated myocardial infarction) (HCC)     Past Medical History:   Diagnosis Date   • Cancer (HCC)     leukemia   • Coronary artery disease    • Diabetes mellitus (HCC)    • Hypertension    • Injury of back    • Myocardial infarction (HCC)    • Stroke (HCC)      Past Surgical History:   Procedure Laterality Date   • CATARACT EXTRACTION     • TRIGEMINAL NERVE DECOMPRESSION     • TUBAL ABDOMINAL LIGATION     • UVULOPALATOPHARYNGOPLASTY       PT Assessment (last 12 hours)     PT Evaluation and Treatment     [facility] Name 22 1025          Physical Therapy Time and Intention    Subjective Information no complaints  -LN     Document Type therapy note (daily note)  -LN     Mode of Treatment individual therapy;physical therapy  -LN     Comment time should be [id number]  -LN     [facility] Name 22 1025          General Information    Patient Profile Reviewed yes  -LN     Existing Precautions/Restrictions fall  -LN     Row Name 22 1025          Pain    Pretreatment Pain Rating 0/10 - no pain  -LN     Posttreatment Pain Rating 0/10 - no pain  -LN     Row Name 22 1025          Cognition    Orientation Status (Cognition) oriented to;person    -LN     [facility] Name 22 1025          Range of Motion Comprehensive    General Range of Motion no range of motion deficits identified  -LN     Row Name 22 1025          Bed Mobility    Bed Mobility  supine-sit;sit-supine  -LN     Supine-Sit [place] (Bed Mobility) minimum assist (75% patient effort)  -LN     Sit-Supine [place] (Bed Mobility) not tested  -LN     Assistive Device (Bed Mobility) bed rails;head of bed elevated  -LN     Comment, (Bed Mobility) once in chair incontinent of urine and pt able to stand to be cleaned  -     Row Name 09/20/22 1025          Transfers    Sit-Stand [place] (Transfers) minimum assist (75% patient effort);verbal cues  -LN     Stand-Sit [place] (Transfers) contact guard;verbal cues  -     Row Name 09/20/22 1025          Sit-Stand Transfer    Assistive Device (Sit-Stand Transfers) walker, front-wheeled  -     Row Name 09/20/22 1025          Stand-Sit Transfer    Assistive Device (Stand-Sit Transfers) walker, front-wheeled  -     Row Name 09/20/22 1025          Gait/Stairs (Locomotion)    [place] Level (Gait) minimum assist (75% patient effort)  -LN     Assistive Device (Gait) walker, front-wheeled  -     Distance in Feet (Gait) 2' bed to chair  -LN     Deviations/Abnormal Patterns (Gait) gait speed decreased;festinating/shuffling  -     Row Name 09/20/22 1025          Safety Issues, Functional Mobility    Impairments Affecting Function (Mobility) balance;endurance/activity tolerance;strength  -[facility] Name 09/20/22 1025          Motor Skills    Therapeutic Exercise hip;knee;ankle  -[facility] Name 09/20/22 1025          Hip (Therapeutic Exercise)    Hip (Therapeutic Exercise) AROM (active range of motion)  -     Hip AROM (Therapeutic Exercise) bilateral;flexion;aBduction;aDduction  -[facility] Name 09/20/22 1025          Knee (Therapeutic Exercise)    Knee (Therapeutic Exercise) AROM (active range of motion)  -     Knee AROM (Therapeutic Exercise) bilateral;LAQ (long arc quad)  -     Row Name 09/20/22 1025          Ankle (Therapeutic Exercise)    Ankle (Therapeutic Exercise) AROM (active range of motion)  -     Ankle AROM  (Therapeutic Exercise) bilateral;dorsiflexion;plantarflexion  -LN     Row Name 09/20/22 1025          Plan of Care Review    Plan of Care Reviewed With patient  -LN     Outcome Evaluation sup-sit-stand min of 1,stand-sit cga of 1;bed-chair with rw and min of 1~2'  -LN     Row Name 09/20/22 1025          Vital Signs    Pre Systolic BP Rehab 146  -LN     Pre Treatment Diastolic BP 66  -LN     Post Systolic BP Rehab 110  -LN     Post Treatment Diastolic BP 76  -LN     Pretreatment Heart Rate (beats/min) 85  -LN     Posttreatment Heart Rate (beats/min) 87  -LN     Pre SpO2 (%) 91  -LN     O2 Delivery Pre Treatment room air  -LN     Post SpO2 (%) 94  -LN     Pre Patient Position Supine  -LN     Intra Patient Position Standing  -LN     Post Patient Position Sitting  -LN     Row Name 09/20/22 1025          Positioning and Restraints    Post Treatment Position chair  -LN     In Chair notified nsg;reclined;call light within reach;encouraged to call for assist;legs elevated  -LN     Row Name 09/20/22 1025          Therapy Assessment/Plan (PT)    Rehab Potential (PT) good, to achieve stated therapy goals  -LN     Criteria for Skilled Interventions Met (PT) yes;meets criteria;skilled treatment is necessary  -LN     Therapy Frequency (PT) other (see comments)  3-7d/week  -     Row Name 09/20/22 1025          Bed Mobility Goal 1 (PT)    Activity/Assistive Device (Bed Mobility Goal 1, PT) sit to supine;supine to sit  -LN     [place] Level/Cues Needed (Bed Mobility Goal 1, PT) modified independence  -LN     Time Frame (Bed Mobility Goal 1, PT) by discharge  -LN     Progress/Outcomes (Bed Mobility Goal 1, PT) goal not met  -     Row Name 09/20/22 1025          Transfer Goal 1 (PT)    Activity/Assistive Device (Transfer Goal 1, PT) sit-to-stand/stand-to-sit;bed-to-chair/chair-to-bed  -LN     [place] Level/Cues Needed (Transfer Goal 1, PT) modified independence  -LN     Time Frame (Transfer Goal 1, PT) by discharge  -LN      Progress/Outcome (Transfer Goal 1, PT) goal not met  -LN     Row Name 09/20/22 1025          Gait Training Goal 1 (PT)    Activity/Assistive Device (Gait Training Goal 1, PT) gait (walking locomotion);assistive device use  -LN     [place] Level (Gait Training Goal 1, PT) modified independence  -LN     Distance (Gait Training Goal 1, PT) 50'x2  -LN     Time Frame (Gait Training Goal 1, PT) by discharge  -LN     Progress/Outcome (Gait Training Goal 1, PT) goal not met  -LN     Row Name 09/20/22 1025          Stairs Goal 1 (PT)    Activity/Assistive Device (Stairs Goal 1, PT) stairs, all skills;ascending stairs;descending stairs  -LN     [place] Level/Cues Needed (Stairs Goal 1, PT) modified independence  -LN     Number of Stairs (Stairs Goal 1, PT) 1  -LN     Time Frame (Stairs Goal 1, PT) by discharge  -LN     Progress/Outcome (Stairs Goal 1, PT) goal not met  -LN           User Key  (r) = Recorded By, (t) = Taken By, (c) = Cosigned By    Initials Name Provider Type    Mila Glass PTA Physical Therapist Assistant                Physical Therapy Education                 Title: PT OT SLP Therapies (In Progress)     Topic: Physical Therapy (Done)     Point: Mobility training (Done)     Learning Progress Summary           Patient Acceptance, E,TB, VU by LR at 9/19/2022 1052    Comment: Educated on PT POC and goals                   Point: Home exercise program (Done)     Learning Progress Summary           Patient Acceptance, E,TB, VU by LR at 9/19/2022 1052    Comment: Educated on PT POC and goals                   Point: Body mechanics (Done)     Learning Progress Summary           Patient Acceptance, E,TB, VU by LR at 9/19/2022 1052    Comment: Educated on PT POC and goals                   Point: Precautions (Done)     Learning Progress Summary           Patient Acceptance, E,TB, VU by LR at 9/19/2022 1052    Comment: Educated on PT POC and goals                               User Key     Initials  Effective Dates Name Provider Type Discipline    LR 06/16/21 -  Isai Mcmillan Physical Therapist PT              PT Recommendation and Plan  Anticipated Discharge Disposition (PT): skilled nursing facility  Therapy Frequency (PT): other (see comments) (3-7d/week)  Plan of Care Reviewed With: patient  Outcome Evaluation: sup-sit-stand min of 1,stand-sit cga of 1;bed-chair with rw and min of 1~2'   Outcome Measures     Row Name 09/20/22 1025 09/19/22 1540          How much help from another person do you currently need...    Turning from your back to your side while in flat bed without using bedrails? 3  -LN --     Moving from lying on back to sitting on the side of a flat bed without bedrails? 3  -LN --     Moving to and from a bed to a chair (including a wheelchair)? 3  -LN --     Standing up from a chair using your arms (e.g., wheelchair, bedside chair)? 3  -LN --     Climbing 3-5 steps with a railing? 3  -LN --     To walk in hospital room? 3  -LN --     AM-PAC 6 Clicks Score (PT) 18  -LN --            How much help from another is currently needed...    Putting on and taking off regular lower body clothing? -- 3  -KH     Bathing (including washing, rinsing, and drying) -- 3  -KH     Toileting (which includes using toilet bed pan or urinal) -- 3  -KH     Putting on and taking off regular upper body clothing -- 3  -KH     Taking care of personal grooming (such as brushing teeth) -- 4  -KH     Eating meals -- 4  -KH     AM-PAC 6 Clicks Score (OT) -- 20  -KH            Functional Assessment    Outcome Measure Options AM-PAC 6 Clicks Daily Activity (OT)  -LN AM-PAC 6 Clicks Daily Activity (OT)  -KH           User Key  (r) = Recorded By, (t) = Taken By, (c) = Cosigned By    Initials Name Provider Type    LN Mila Yates PTA Physical Therapist Assistant    Meliza De Los Santos, OT Occupational Therapist                 Time Calculation:    PT Charges     Row Name 09/20/22 1103             Time Calculation    Start  Time 1025  -LN      Stop Time 1100  -LN      Time Calculation (min) 35 min  -LN      PT Received On 09/20/22  -LN              Time Calculation- PT    Total Timed Code Minutes- PT 35 minute(s)  -LN            User Key  (r) = Recorded By, (t) = Taken By, (c) = Cosigned By    Initials Name Provider Type    LN Mila Yates PTA Physical Therapist Assistant              Therapy Charges for Today     Code Description Service Date Service Provider Modifiers Qty    95501230025  PT THERAPEUTIC ACT EA 15 MIN 9/20/2022 Mila Yates PTA GP 1    46447153747 HC PT THER PROC EA 15 MIN 9/20/2022 Mila Yates PTA GP 1          PT G-Codes  Outcome Measure Options: AM-PAC 6 Clicks Daily Activity (OT)  AM-PAC 6 Clicks Score (PT): 18  AM-PAC 6 Clicks Score (OT): 20    Milamalorie Yates PTA  9/20/2022

## 2022-09-21 NOTE — PROCEDURES
This was a cardiac cath please see note   No specimen sent   Blood loss <5cc    Ayaz rosenbaum DO

## 2022-09-22 NOTE — PROCEDURES
This was a cardiac cath please see note   No specimen sent   Blood losThis was a cardiac cath please see note   No specimen sent   Blood loss <5cc     Ayaz rosenbaum DOs <5cc

## 2022-10-16 ENCOUNTER — HOSPITAL ENCOUNTER (EMERGENCY)
Facility: HOSPITAL | Age: 82
Discharge: NURSING, REHAB OR LONG-TERM CARE | End: 2022-10-16
Attending: FAMILY MEDICINE | Admitting: FAMILY MEDICINE

## 2022-10-16 ENCOUNTER — APPOINTMENT (OUTPATIENT)
Dept: CT IMAGING | Facility: HOSPITAL | Age: 82
End: 2022-10-16

## 2022-10-16 ENCOUNTER — APPOINTMENT (OUTPATIENT)
Dept: GENERAL RADIOLOGY | Facility: HOSPITAL | Age: 82
End: 2022-10-16

## 2022-10-16 VITALS
HEIGHT: 62 IN | WEIGHT: 188 LBS | BODY MASS INDEX: 34.6 KG/M2 | HEART RATE: 77 BPM | TEMPERATURE: 97.6 F | RESPIRATION RATE: 16 BRPM | DIASTOLIC BLOOD PRESSURE: 75 MMHG | SYSTOLIC BLOOD PRESSURE: 168 MMHG | OXYGEN SATURATION: 98 %

## 2022-10-16 DIAGNOSIS — R42 EPISODIC LIGHTHEADEDNESS: Primary | ICD-10-CM

## 2022-10-16 LAB
ALBUMIN SERPL-MCNC: 3.8 G/DL (ref 3.5–5.2)
ALBUMIN/GLOB SERPL: 0.9 G/DL
ALP SERPL-CCNC: 145 U/L (ref 39–117)
ALT SERPL W P-5'-P-CCNC: 13 U/L (ref 1–33)
ANION GAP SERPL CALCULATED.3IONS-SCNC: 10 MMOL/L (ref 5–15)
AST SERPL-CCNC: 19 U/L (ref 1–32)
BASOPHILS # BLD AUTO: 0.08 10*3/MM3 (ref 0–0.2)
BASOPHILS NFR BLD AUTO: 0.7 % (ref 0–1.5)
BILIRUB SERPL-MCNC: <0.2 MG/DL (ref 0–1.2)
BUN SERPL-MCNC: 28 MG/DL (ref 8–23)
BUN/CREAT SERPL: 21.2 (ref 7–25)
CALCIUM SPEC-SCNC: 9.1 MG/DL (ref 8.6–10.5)
CARBAMAZEPINE SERPL-MCNC: 8.3 MCG/ML (ref 4–12)
CHLORIDE SERPL-SCNC: 102 MMOL/L (ref 98–107)
CO2 SERPL-SCNC: 26 MMOL/L (ref 22–29)
CREAT SERPL-MCNC: 1.32 MG/DL (ref 0.57–1)
DEPRECATED RDW RBC AUTO: 42.5 FL (ref 37–54)
EGFRCR SERPLBLD CKD-EPI 2021: 40.4 ML/MIN/1.73
EOSINOPHIL # BLD AUTO: 0.23 10*3/MM3 (ref 0–0.4)
EOSINOPHIL NFR BLD AUTO: 2.1 % (ref 0.3–6.2)
ERYTHROCYTE [DISTWIDTH] IN BLOOD BY AUTOMATED COUNT: 13 % (ref 12.3–15.4)
GLOBULIN UR ELPH-MCNC: 4.1 GM/DL
GLUCOSE SERPL-MCNC: 178 MG/DL (ref 65–99)
HCT VFR BLD AUTO: 36.8 % (ref 34–46.6)
HGB BLD-MCNC: 12 G/DL (ref 12–15.9)
HOLD SPECIMEN: NORMAL
HOLD SPECIMEN: NORMAL
IMM GRANULOCYTES # BLD AUTO: 0.04 10*3/MM3 (ref 0–0.05)
IMM GRANULOCYTES NFR BLD AUTO: 0.4 % (ref 0–0.5)
LYMPHOCYTES # BLD AUTO: 1.52 10*3/MM3 (ref 0.7–3.1)
LYMPHOCYTES NFR BLD AUTO: 13.8 % (ref 19.6–45.3)
MAGNESIUM SERPL-MCNC: 2.1 MG/DL (ref 1.6–2.4)
MCH RBC QN AUTO: 29.5 PG (ref 26.6–33)
MCHC RBC AUTO-ENTMCNC: 32.6 G/DL (ref 31.5–35.7)
MCV RBC AUTO: 90.4 FL (ref 79–97)
MONOCYTES # BLD AUTO: 0.56 10*3/MM3 (ref 0.1–0.9)
MONOCYTES NFR BLD AUTO: 5.1 % (ref 5–12)
NEUTROPHILS NFR BLD AUTO: 77.9 % (ref 42.7–76)
NEUTROPHILS NFR BLD AUTO: 8.61 10*3/MM3 (ref 1.7–7)
NRBC BLD AUTO-RTO: 0 /100 WBC (ref 0–0.2)
NT-PROBNP SERPL-MCNC: 6549 PG/ML (ref 0–1800)
PLATELET # BLD AUTO: 223 10*3/MM3 (ref 140–450)
PMV BLD AUTO: 8.8 FL (ref 6–12)
POTASSIUM SERPL-SCNC: 4.3 MMOL/L (ref 3.5–5.2)
PROT SERPL-MCNC: 7.9 G/DL (ref 6–8.5)
RBC # BLD AUTO: 4.07 10*6/MM3 (ref 3.77–5.28)
SODIUM SERPL-SCNC: 138 MMOL/L (ref 136–145)
TROPONIN T SERPL-MCNC: <0.01 NG/ML (ref 0–0.03)
WBC NRBC COR # BLD: 11.04 10*3/MM3 (ref 3.4–10.8)
WHOLE BLOOD HOLD COAG: NORMAL
WHOLE BLOOD HOLD SPECIMEN: NORMAL

## 2022-10-16 PROCEDURE — 99285 EMERGENCY DEPT VISIT HI MDM: CPT

## 2022-10-16 PROCEDURE — 84484 ASSAY OF TROPONIN QUANT: CPT | Performed by: FAMILY MEDICINE

## 2022-10-16 PROCEDURE — 70450 CT HEAD/BRAIN W/O DYE: CPT

## 2022-10-16 PROCEDURE — 83735 ASSAY OF MAGNESIUM: CPT | Performed by: FAMILY MEDICINE

## 2022-10-16 PROCEDURE — 93010 ELECTROCARDIOGRAM REPORT: CPT | Performed by: INTERNAL MEDICINE

## 2022-10-16 PROCEDURE — 71045 X-RAY EXAM CHEST 1 VIEW: CPT

## 2022-10-16 PROCEDURE — 63710000001 ONDANSETRON ODT 4 MG TABLET DISPERSIBLE: Performed by: FAMILY MEDICINE

## 2022-10-16 PROCEDURE — 83880 ASSAY OF NATRIURETIC PEPTIDE: CPT | Performed by: FAMILY MEDICINE

## 2022-10-16 PROCEDURE — 93005 ELECTROCARDIOGRAM TRACING: CPT | Performed by: FAMILY MEDICINE

## 2022-10-16 PROCEDURE — 80156 ASSAY CARBAMAZEPINE TOTAL: CPT | Performed by: FAMILY MEDICINE

## 2022-10-16 PROCEDURE — 80053 COMPREHEN METABOLIC PANEL: CPT | Performed by: FAMILY MEDICINE

## 2022-10-16 PROCEDURE — 85025 COMPLETE CBC W/AUTO DIFF WBC: CPT | Performed by: FAMILY MEDICINE

## 2022-10-16 RX ORDER — ONDANSETRON 4 MG/1
4 TABLET, ORALLY DISINTEGRATING ORAL ONCE
Status: COMPLETED | OUTPATIENT
Start: 2022-10-16 | End: 2022-10-16

## 2022-10-16 RX ORDER — SODIUM CHLORIDE 0.9 % (FLUSH) 0.9 %
10 SYRINGE (ML) INJECTION AS NEEDED
Status: DISCONTINUED | OUTPATIENT
Start: 2022-10-16 | End: 2022-10-16 | Stop reason: HOSPADM

## 2022-10-16 RX ADMIN — ONDANSETRON 4 MG: 4 TABLET, ORALLY DISINTEGRATING ORAL at 12:18

## 2022-10-16 NOTE — ED PROVIDER NOTES
Subjective     History provided by:  Patient   used: No    Patient is 82 years old who presented here today from from nursing home due to episode of lightheadedness.  Patient denies any chest pain or shortness of breath.  She says she was standing up and then she feel lightheaded. denied hitting her head.  Denies any fever chills or sweating.    Review of Systems   All other systems reviewed and are negative.      Past Medical History:   Diagnosis Date   • Cancer (HCC) 2009    leukemia   • Coronary artery disease    • Diabetes mellitus (HCC)    • Hypertension    • Injury of back    • Myocardial infarction (HCC)    • Stroke (HCC)        Allergies   Allergen Reactions   • Sulfa Antibiotics Itching and Rash     Patient states she had the Worse yeast infection ever.  Reaction: rash  Patient states she had the Worse yeast infection ever.     • Atorvastatin      Memory   • Levofloxacin    • Meclizine    • Morphine        Past Surgical History:   Procedure Laterality Date   • CARDIAC CATHETERIZATION Left 9/18/2022    Procedure: Left Heart Cath;  Surgeon: Ayaz Wild DO;  Location: [facility] CATH INVASIVE LOCATION;  Service: Cardiology;  Laterality: Left;   • CARDIAC CATHETERIZATION N/A 9/19/2022    Procedure: PERCUTANEOUS CORONARY INTERVENTION;  Surgeon: Ayaz Wild DO;  Location: [facility] CATH INVASIVE LOCATION;  Service: Cardiology;  Laterality: N/A;   • CATARACT EXTRACTION     • TRIGEMINAL NERVE DECOMPRESSION     • TUBAL ABDOMINAL LIGATION     • UVULOPALATOPHARYNGOPLASTY         History reviewed. No pertinent family history.    Social History     Socioeconomic History   • Marital status:    Tobacco Use   • Smoking status: Never   • Smokeless tobacco: Never   Substance and Sexual Activity   • Alcohol use: No   • Drug use: No   • Sexual activity: Never           Objective   Physical Exam  Vitals and nursing note reviewed.   Constitutional:       Appearance: Normal appearance. She is obese.    HENT:      Head: Normocephalic and atraumatic.      Right Ear: Tympanic membrane, ear canal and external ear normal.      Left Ear: Tympanic membrane, ear canal and external ear normal.      Nose: Nose normal.   Eyes:      Extraocular Movements: Extraocular movements intact.      Conjunctiva/sclera: Conjunctivae normal.      Pupils: Pupils are equal, round, and reactive to light.   Cardiovascular:      Rate and Rhythm: Normal rate and regular rhythm.      Pulses: Normal pulses.      Heart sounds: Normal heart sounds.   Pulmonary:      Effort: Pulmonary effort is normal.      Breath sounds: Normal breath sounds.   Abdominal:      General: Abdomen is flat. Bowel sounds are normal.      Palpations: Abdomen is soft.   Musculoskeletal:         General: Normal range of motion.      Cervical back: Normal range of motion and neck supple.   Skin:     General: Skin is warm.      Capillary Refill: Capillary refill takes less than 2 seconds.   Neurological:      General: No focal deficit present.      Mental Status: She is alert and oriented to person, place, and time.         Procedures           ED Course  ED Course as of 10/16/22 1445   Sun Oct 16, 2022   1444 Patient feeling much better. [MO]      ED Course User Index  [MO] Phuc Leija MD                                           [facility]    Final diagnoses:   Episodic lightheadedness       ED Disposition  ED Disposition     ED Disposition   Discharge    Condition   Stable    Comment   --             Sean Briones MD  [address]  [place] [address]  [phone]    In 3 days           Medication List      No changes were made to your prescriptions during this visit.          Phuc Leija MD  10/16/22 1440

## 2022-10-19 ENCOUNTER — OFFICE VISIT (OUTPATIENT)
Dept: CARDIOLOGY | Facility: CLINIC | Age: 82
End: 2022-10-19

## 2022-10-19 VITALS
OXYGEN SATURATION: 100 % | HEIGHT: 62 IN | SYSTOLIC BLOOD PRESSURE: 128 MMHG | HEART RATE: 76 BPM | DIASTOLIC BLOOD PRESSURE: 72 MMHG | BODY MASS INDEX: 34.39 KG/M2

## 2022-10-19 DIAGNOSIS — I21.4 NON-STEMI (NON-ST ELEVATED MYOCARDIAL INFARCTION): Primary | ICD-10-CM

## 2022-10-19 PROCEDURE — 93000 ELECTROCARDIOGRAM COMPLETE: CPT | Performed by: INTERNAL MEDICINE

## 2022-10-19 PROCEDURE — 99214 OFFICE O/P EST MOD 30 MIN: CPT | Performed by: INTERNAL MEDICINE

## 2022-10-19 RX ORDER — INSULIN GLARGINE 100 [IU]/ML
INJECTION, SOLUTION SUBCUTANEOUS
COMMUNITY

## 2022-10-19 RX ORDER — TRAMADOL HYDROCHLORIDE 50 MG/1
50 TABLET ORAL EVERY 6 HOURS PRN
COMMUNITY

## 2022-10-19 NOTE — PROGRESS NOTES
[facility] Cardiology  OFFICE NOTE    Cardiovascular Medicine  Matt Black M.D., [facility], [facility], [facility]         No referring provider defined for this encounter.    Thank you for asking me to see Magy Barton for CAD.    History of Present Illness  This is a 82 y.o. female with:    1.  Coronary artery disease  2.  Diabetes  3.  Hypertension  4.  Mild aortic stenosis    Magy Barton is a 82 y.o. female who presents for consultation today.  Patient presented with NSTEMI last month.  Underwent PCI to OM which was the culprit lesion for presentation.  Also underwent PCI to her left circumflex.  Had PCI to LAD in the index procedure.  She had critical stenosis in the RCA for which she underwent staged PCI.  Moderate residual disease in the proximal RCA.   She has been on aspirin/Brilinta/Crestor/Coreg  Echocardiogram showed mild reduced LV systolic function.    She is at nursing home.  In wheelchair today.  Denying any chest pain or shortness of breath.  No bleeding problems for antiplatelet.          Review of Systems - ROS  Constitution: Negative for weakness, weight gain and weight loss.   HENT: Negative for congestion.    Eyes: Negative for blurred vision.   Cardiovascular: As mentioned above  Respiratory: Negative for cough and hemoptysis.    Endocrine: Negative for polydipsia and polyuria.   Hematologic/Lymphatic: Negative for bleeding problem. Does not bruise/bleed easily.   Skin: Negative for flushing.   Musculoskeletal: Negative for neck pain and stiffness.   Gastrointestinal: Negative for abdominal pain, diarrhea, jaundice, melena, nausea and vomiting.   Genitourinary: Negative for dysuria and hematuria.   Neurological: Negative for dizziness, focal weakness and numbness.   Psychiatric/Behavioral: Negative for altered mental status and depression.          All other systems were reviewed and were negative.    family history is not on file.     reports that she has never smoked. She has never  "used smokeless tobacco. She reports that she does not drink alcohol and does not use drugs.    Allergies   Allergen Reactions   • Sulfa Antibiotics Itching and Rash     Patient states she had the Worse yeast infection ever.  Reaction: rash  Patient states she had the Worse yeast infection ever.     • Atorvastatin      Memory   • Levofloxacin    • Meclizine    • Morphine          Current Outpatient Medications:   •  amLODIPine (NORVASC) 10 MG tablet, Take 10 mg by mouth Every Night., Disp: , Rfl:   •  Aspirin (ASPIR-81 PO), Take 81 mg by mouth Daily., Disp: , Rfl:   •  carBAMazepine (TEGretol) 200 MG tablet, Take 200 mg by mouth 2 (Two) Times a Day., Disp: , Rfl:   •  carvedilol (COREG) 12.5 MG tablet, Take 1 tablet by mouth 2 (Two) Times a Day With Meals., Disp: 60 tablet, Rfl: 5  •  Insulin Glargine (BASAGLAR KWIKPEN) 100 UNIT/ML injection pen, Inject  under the skin into the appropriate area as directed., Disp: , Rfl:   •  lisinopril (PRINIVIL,ZESTRIL) 40 MG tablet, Take 40 mg by mouth Daily., Disp: , Rfl:   •  melatonin 3 MG tablet, Take 3 mg by mouth Every Night. For insomnia, Disp: , Rfl:   •  Mirabegron ER (MYRBETRIQ) 50 MG tablet sustained-release 24 hour 24 hr tablet, Take 50 mg by mouth Daily., Disp: , Rfl:   •  rosuvastatin (CRESTOR) 20 MG tablet, Take 1 tablet by mouth Every Night., Disp: 90 tablet, Rfl: 5  •  ticagrelor (BRILINTA) 90 MG tablet tablet, Take 1 tablet by mouth 2 (Two) Times a Day., Disp: 60 tablet, Rfl: 11  •  traMADol (ULTRAM) 50 MG tablet, Take 1 tablet by mouth Every 6 (Six) Hours As Needed for Moderate Pain., Disp: , Rfl:   •  venlafaxine (EFFEXOR) 37.5 MG tablet, Take 37.5 mg by mouth 2 (Two) Times a Day., Disp: , Rfl:     Physical Exam:  Vitals:    10/19/22 1107   BP: 128/72   BP Location: Left arm   Patient Position: Sitting   Cuff Size: Adult   Pulse: 76   SpO2: 100%   Height: 157.5 cm (62\")   PainSc: 0-No pain     Current Pain Level: none  Pulse Ox: Normal  on room air  General: " alert, appears stated age and cooperative     Body Habitus: well-nourished    HEENT: Head: Normocephalic, no lesions, without obvious abnormality. No arcus senilis, xanthelasma or xanthomas.    Neuro: alert, oriented x3  Pulses: 2+ and symmetric  JVP: Volume/Pulsation: Normal.  Normal waveforms.   Appropriate inspiratory decrease.  No Kussmaul's. No Ramon's.   Carotid Exam: no bruit normal pulsation bilaterally   Carotid Volume: normal.     Respirations: no increased work of breathing   Chest:  Normal    Pulmonary:Normal   Precordium: Normal impulses. P2 is not palpable.  RV Heave: absent  LV Heave: absent  [place]:  normal size and placement  Palpable S4: absent.  Heart rate: normal    Heart Rhythm: regular     Heart Sounds: S1: normal  S2: normal  S3: absent   S4: absent  Opening Snap: absent    Pericardial Rub:  Absent: .    Abdomen:   Appearance: normal .  Palpation: Soft, non-tender to palpation, bowel sounds positive in all four quadrants; no guarding or rebound tenderness  Extremity: no edema.   LE Skin: no rashes  LE Hair:  normal  LE Pulses: well perfused with normal pulses in the distal extremities  Pallor on elevation: Absent. Rubor on dependency: None      DATA REVIEWED:     EKG. I personally reviewed and interpreted the EKG.  Sinus rhythm, T wave inversions lateral leads    ECG/EMG Results (all)     Procedure Component Value Units Date/Time    ECG 12 Lead [[id number]] Collected: 10/19/22 1114     Updated: 10/19/22 1120     QT Interval 392 ms      QTC Interval 441 ms     Narrative:      Test Reason : NSTEMI  Blood Pressure :   */*   mmHG  Vent. Rate :  76 BPM     Atrial Rate :  76 BPM     P-R Int : 158 ms          QRS Dur : 112 ms      QT Int : 392 ms       P-R-T Axes :  95  51 120 degrees     QTc Int : 441 ms    Normal sinus rhythm  T wave abnormality, consider lateral ischemia  Abnormal ECG  When compared with ECG of 16-OCT-2022 11:28,  No significant change was found    Referred By:             Confirmed By:         ---------------------------------------------------  TTE/RJ:  Results for orders placed during the hospital encounter of 09/18/22    Adult Transthoracic Echo Complete w/ Color, Spectral and Contrast if Necessary Per Protocol    Interpretation Summary  · Left ventricular wall thickness is consistent with moderate to severe septal asymmetric hypertrophy.  · Left ventricular ejection fraction appears to be 41 - 45%. Left ventricular systolic function is moderately decreased.  · Left ventricular diastolic function is consistent with age.  · Mild aortic valve stenosis is present.    .-----------------------------------------------------  CT:   CT Head Without Contrast    Result Date: 10/16/2022  Involutional, atrophic changes. 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 CT brain without contrast is otherwise unremarkable. No change since prior exam. Electronically signed by:  Everett Lance MD  10/16/2022 12:23 PM CDT Workstation: [id number]    XR Chest 1 View    Result Date: 10/16/2022  No evidence of active disease. No change since prior exam. Electronically signed by:  Everett Lance MD  10/16/2022 12:04 PM [employer] Workstation: [id number]    I        --------------------------------------------------------------------------------------------------  LABS:     The CVD Risk score (Susan et al., 2008) failed to calculate for the following reasons:    The 2008 CVD risk score is only valid for ages 30 to 74    The patient has a prior MI, stroke, CHF, or peripheral vascular disease diagnosis         Lab Results   Component Value Date    GLUCOSE 178 (H) 10/16/2022    BUN 28 (H) 10/16/2022    CREATININE 1.32 (H) 10/16/2022    EGFRIFNONA 42 (L) 02/27/2022    EGFRIFAFRI 73 03/26/2019    BCR 21.2 10/16/2022    K 4.3 10/16/2022    CO2 26.0 10/16/2022    CALCIUM 9.1 10/16/2022    ALBUMIN 3.80 10/16/2022    AST 19 10/16/2022    ALT 13 10/16/2022     Lab Results   Component Value Date    WBC 11.04 (H) 10/16/2022    HGB  12.0 10/16/2022    HCT 36.8 10/16/2022    MCV 90.4 10/16/2022     10/16/2022     Lab Results   Component Value Date    CHOL 219 (H) 01/07/2020    CHLPL 194 03/22/2019    TRIG 251 (H) 01/07/2020    HDL 41 01/07/2020     (H) 01/07/2020     Lab Results   Component Value Date    TSH 2.81 03/21/2019     Lab Results   Component Value Date    CKTOTAL 391 (H) 09/18/2022    TROPONINI 0.02 03/21/2019    TROPONINT <0.010 10/16/2022     Lab Results   Component Value Date    HGBA1C 9.7 (H) 03/01/2021     No results found for: DDIMER  Lab Results   Component Value Date    ALT 13 10/16/2022     Lab Results   Component Value Date    HGBA1C 9.7 (H) 03/01/2021    HGBA1C 6.7 (H) 08/31/2020    HGBA1C 8.4 (H) 01/07/2020     Lab Results   Component Value Date    CREATININE 1.32 (H) 10/16/2022     No results found for: IRON, TIBC, FERRITIN  Lab Results   Component Value Date    INR 1.04 09/18/2022    INR 1.06 02/27/2022    PROTIME 13.5 09/18/2022    PROTIME 13.7 02/27/2022       [unfilled]    1.  Coronary artery disease:  Extensive history of coronary artery disease with multiple PCI's recently.  On appropriate medical therapy with aspirin/Brilinta/Coreg/Crestor and amlodipine and lisinopril.  Echocardiogram with mildly reduced LV systolic function.    2.  Mild reduced LV systolic function:  In the setting of recent NSTEMI with multivessel PCI.  On appropriate medical therapy with lisinopril and Coreg.  Appears euvolemic.    3.  Hyperlipidemia:  On Crestor 20 mg.  LDL is 120.      Prevention:  BMI is >= 30 and <35. (Class 1 Obesity). The following options were offered after discussion;: exercise counseling/recommendations      Magy Barton  reports that she has never smoked. She has never used smokeless tobacco..            This document has been electronically signed by Matt Black MD on October 19, 2022 11:27 CDT

## 2022-10-26 LAB
QT INTERVAL: 392 MS
QT INTERVAL: 414 MS
QTC INTERVAL: 441 MS
QTC INTERVAL: 459 MS

## 2023-01-09 ENCOUNTER — HOSPITAL ENCOUNTER (EMERGENCY)
Facility: HOSPITAL | Age: 83
Discharge: HOME | End: 2023-01-10
Attending: STUDENT IN AN ORGANIZED HEALTH CARE EDUCATION/TRAINING PROGRAM | Admitting: STUDENT IN AN ORGANIZED HEALTH CARE EDUCATION/TRAINING PROGRAM
Payer: MEDICARE

## 2023-01-09 ENCOUNTER — APPOINTMENT (OUTPATIENT)
Dept: GENERAL RADIOLOGY | Facility: HOSPITAL | Age: 83
End: 2023-01-09
Payer: MEDICARE

## 2023-01-09 ENCOUNTER — APPOINTMENT (OUTPATIENT)
Dept: CT IMAGING | Facility: HOSPITAL | Age: 83
End: 2023-01-09
Payer: MEDICARE

## 2023-01-09 DIAGNOSIS — J18.9 PNEUMONIA OF BOTH LUNGS DUE TO INFECTIOUS ORGANISM, UNSPECIFIED PART OF LUNG: ICD-10-CM

## 2023-01-09 DIAGNOSIS — N30.01 ACUTE CYSTITIS WITH HEMATURIA: Primary | ICD-10-CM

## 2023-01-09 LAB
ALBUMIN SERPL-MCNC: 3.6 G/DL (ref 3.5–5.2)
ALBUMIN/GLOB SERPL: 0.9 G/DL
ALP SERPL-CCNC: 145 U/L (ref 39–117)
ALT SERPL W P-5'-P-CCNC: 9 U/L (ref 1–33)
ANION GAP SERPL CALCULATED.3IONS-SCNC: 8 MMOL/L (ref 5–15)
AST SERPL-CCNC: 17 U/L (ref 1–32)
BACTERIA UR QL AUTO: ABNORMAL /HPF
BASOPHILS # BLD AUTO: 0.07 10*3/MM3 (ref 0–0.2)
BASOPHILS NFR BLD AUTO: 0.9 % (ref 0–1.5)
BILIRUB SERPL-MCNC: <0.2 MG/DL (ref 0–1.2)
BILIRUB UR QL STRIP: NEGATIVE
BUN SERPL-MCNC: 25 MG/DL (ref 8–23)
BUN/CREAT SERPL: 18.8 (ref 7–25)
CALCIUM SPEC-SCNC: 9 MG/DL (ref 8.6–10.5)
CHLORIDE SERPL-SCNC: 103 MMOL/L (ref 98–107)
CLARITY UR: ABNORMAL
CO2 SERPL-SCNC: 27 MMOL/L (ref 22–29)
COLOR UR: ABNORMAL
CREAT SERPL-MCNC: 1.33 MG/DL (ref 0.57–1)
DEPRECATED RDW RBC AUTO: 47.1 FL (ref 37–54)
EGFRCR SERPLBLD CKD-EPI 2021: 40 ML/MIN/1.73
EOSINOPHIL # BLD AUTO: 0.28 10*3/MM3 (ref 0–0.4)
EOSINOPHIL NFR BLD AUTO: 3.5 % (ref 0.3–6.2)
ERYTHROCYTE [DISTWIDTH] IN BLOOD BY AUTOMATED COUNT: 14.3 % (ref 12.3–15.4)
GLOBULIN UR ELPH-MCNC: 3.9 GM/DL
GLUCOSE SERPL-MCNC: 108 MG/DL (ref 65–99)
GLUCOSE UR STRIP-MCNC: NEGATIVE MG/DL
HCT VFR BLD AUTO: 34.7 % (ref 34–46.6)
HGB BLD-MCNC: 11.2 G/DL (ref 12–15.9)
HGB UR QL STRIP.AUTO: ABNORMAL
HOLD SPECIMEN: NORMAL
HYALINE CASTS UR QL AUTO: ABNORMAL /LPF
IMM GRANULOCYTES # BLD AUTO: 0.03 10*3/MM3 (ref 0–0.05)
IMM GRANULOCYTES NFR BLD AUTO: 0.4 % (ref 0–0.5)
KETONES UR QL STRIP: NEGATIVE
LEUKOCYTE ESTERASE UR QL STRIP.AUTO: ABNORMAL
LYMPHOCYTES # BLD AUTO: 2.19 10*3/MM3 (ref 0.7–3.1)
LYMPHOCYTES NFR BLD AUTO: 27.3 % (ref 19.6–45.3)
MCH RBC QN AUTO: 29.2 PG (ref 26.6–33)
MCHC RBC AUTO-ENTMCNC: 32.3 G/DL (ref 31.5–35.7)
MCV RBC AUTO: 90.6 FL (ref 79–97)
MONOCYTES # BLD AUTO: 0.69 10*3/MM3 (ref 0.1–0.9)
MONOCYTES NFR BLD AUTO: 8.6 % (ref 5–12)
NEUTROPHILS NFR BLD AUTO: 4.77 10*3/MM3 (ref 1.7–7)
NEUTROPHILS NFR BLD AUTO: 59.3 % (ref 42.7–76)
NITRITE UR QL STRIP: NEGATIVE
NRBC BLD AUTO-RTO: 0 /100 WBC (ref 0–0.2)
NT-PROBNP SERPL-MCNC: 3386 PG/ML (ref 0–1800)
PH UR STRIP.AUTO: 7 [PH] (ref 5–9)
PLATELET # BLD AUTO: 225 10*3/MM3 (ref 140–450)
PMV BLD AUTO: 8.8 FL (ref 6–12)
POTASSIUM SERPL-SCNC: 4.2 MMOL/L (ref 3.5–5.2)
PROT SERPL-MCNC: 7.5 G/DL (ref 6–8.5)
PROT UR QL STRIP: ABNORMAL
QT INTERVAL: 384 MS
QTC INTERVAL: 464 MS
RBC # BLD AUTO: 3.83 10*6/MM3 (ref 3.77–5.28)
RBC # UR STRIP: ABNORMAL /HPF
REF LAB TEST METHOD: ABNORMAL
SODIUM SERPL-SCNC: 138 MMOL/L (ref 136–145)
SP GR UR STRIP: 1.01 (ref 1–1.03)
SQUAMOUS #/AREA URNS HPF: ABNORMAL /HPF
TROPONIN T SERPL-MCNC: <0.01 NG/ML (ref 0–0.03)
UROBILINOGEN UR QL STRIP: ABNORMAL
WBC # UR STRIP: ABNORMAL /HPF
WBC NRBC COR # BLD: 8.03 10*3/MM3 (ref 3.4–10.8)
WHOLE BLOOD HOLD COAG: NORMAL

## 2023-01-09 PROCEDURE — 83880 ASSAY OF NATRIURETIC PEPTIDE: CPT | Performed by: STUDENT IN AN ORGANIZED HEALTH CARE EDUCATION/TRAINING PROGRAM

## 2023-01-09 PROCEDURE — 84484 ASSAY OF TROPONIN QUANT: CPT | Performed by: STUDENT IN AN ORGANIZED HEALTH CARE EDUCATION/TRAINING PROGRAM

## 2023-01-09 PROCEDURE — 80053 COMPREHEN METABOLIC PANEL: CPT | Performed by: STUDENT IN AN ORGANIZED HEALTH CARE EDUCATION/TRAINING PROGRAM

## 2023-01-09 PROCEDURE — 99284 EMERGENCY DEPT VISIT MOD MDM: CPT

## 2023-01-09 PROCEDURE — 93005 ELECTROCARDIOGRAM TRACING: CPT

## 2023-01-09 PROCEDURE — 93005 ELECTROCARDIOGRAM TRACING: CPT | Performed by: STUDENT IN AN ORGANIZED HEALTH CARE EDUCATION/TRAINING PROGRAM

## 2023-01-09 PROCEDURE — 87086 URINE CULTURE/COLONY COUNT: CPT | Performed by: STUDENT IN AN ORGANIZED HEALTH CARE EDUCATION/TRAINING PROGRAM

## 2023-01-09 PROCEDURE — 74176 CT ABD & PELVIS W/O CONTRAST: CPT

## 2023-01-09 PROCEDURE — 93010 ELECTROCARDIOGRAM REPORT: CPT | Performed by: INTERNAL MEDICINE

## 2023-01-09 PROCEDURE — 71045 X-RAY EXAM CHEST 1 VIEW: CPT

## 2023-01-09 PROCEDURE — 85025 COMPLETE CBC W/AUTO DIFF WBC: CPT | Performed by: STUDENT IN AN ORGANIZED HEALTH CARE EDUCATION/TRAINING PROGRAM

## 2023-01-09 PROCEDURE — 87077 CULTURE AEROBIC IDENTIFY: CPT | Performed by: STUDENT IN AN ORGANIZED HEALTH CARE EDUCATION/TRAINING PROGRAM

## 2023-01-09 PROCEDURE — 87186 SC STD MICRODIL/AGAR DIL: CPT | Performed by: STUDENT IN AN ORGANIZED HEALTH CARE EDUCATION/TRAINING PROGRAM

## 2023-01-09 PROCEDURE — 81001 URINALYSIS AUTO W/SCOPE: CPT | Performed by: STUDENT IN AN ORGANIZED HEALTH CARE EDUCATION/TRAINING PROGRAM

## 2023-01-09 RX ORDER — AZITHROMYCIN 250 MG/1
500 TABLET, FILM COATED ORAL ONCE
Status: COMPLETED | OUTPATIENT
Start: 2023-01-09 | End: 2023-01-09

## 2023-01-09 RX ORDER — SODIUM CHLORIDE 0.9 % (FLUSH) 0.9 %
10 SYRINGE (ML) INJECTION AS NEEDED
Status: DISCONTINUED | OUTPATIENT
Start: 2023-01-09 | End: 2023-01-10 | Stop reason: HOSPADM

## 2023-01-09 RX ORDER — CEFDINIR 300 MG/1
300 CAPSULE ORAL 2 TIMES DAILY
Qty: 14 CAPSULE | Refills: 0 | Status: SHIPPED | OUTPATIENT
Start: 2023-01-10 | End: 2023-01-17

## 2023-01-09 RX ORDER — AZITHROMYCIN 250 MG/1
250 TABLET, FILM COATED ORAL DAILY
Qty: 4 TABLET | Refills: 0 | Status: SHIPPED | OUTPATIENT
Start: 2023-01-10 | End: 2023-01-14

## 2023-01-09 RX ORDER — CEFDINIR 300 MG/1
300 CAPSULE ORAL ONCE
Status: COMPLETED | OUTPATIENT
Start: 2023-01-09 | End: 2023-01-09

## 2023-01-09 RX ADMIN — CEFDINIR 300 MG: 300 CAPSULE ORAL at 23:31

## 2023-01-09 RX ADMIN — AZITHROMYCIN MONOHYDRATE 500 MG: 250 TABLET ORAL at 23:31

## 2023-01-10 VITALS
DIASTOLIC BLOOD PRESSURE: 67 MMHG | HEART RATE: 68 BPM | TEMPERATURE: 98.2 F | WEIGHT: 188 LBS | RESPIRATION RATE: 18 BRPM | BODY MASS INDEX: 34.6 KG/M2 | HEIGHT: 62 IN | OXYGEN SATURATION: 95 % | SYSTOLIC BLOOD PRESSURE: 165 MMHG

## 2023-01-10 NOTE — ED PROVIDER NOTES
Subjective   History of Present Illness  Patient presents with left anterior chest pain which she describes as tightness and pain around her heart, that started around noon and persisted until she got to the ER waiting room.  Patient states at the worst, she rated it as 6-7 out of 10 and did not radiate anywhere.  Patient denies shortness of breath, fever chills, diarrhea, cough, nausea vomiting.  Patient recently had an NSTEMI and a heart cath on September 2022 and was started on aspirin and Brilinta for new stents.  Patient endorses taking all of her medications but does not know the name of them.        Review of Systems   Constitutional: Negative for activity change and appetite change.   HENT: Negative for congestion and ear pain.    Eyes: Negative for pain and discharge.   Respiratory: Positive for chest tightness. Negative for shortness of breath.    Cardiovascular: Negative for chest pain and palpitations.   Gastrointestinal: Negative for abdominal distention and abdominal pain.   Endocrine: Negative for cold intolerance and heat intolerance.   Genitourinary: Negative for difficulty urinating and dysuria.   Musculoskeletal: Negative for arthralgias and back pain.   Skin: Negative for color change and rash.   Allergic/Immunologic: Negative for environmental allergies and food allergies.   Neurological: Negative for dizziness and headaches.   Hematological: Negative for adenopathy. Does not bruise/bleed easily.   Psychiatric/Behavioral: Negative for agitation and confusion.       Past Medical History:   Diagnosis Date   • Cancer (HCC) 2009    leukemia   • Coronary artery disease    • Diabetes mellitus (HCC)    • Hypertension    • Injury of back    • Myocardial infarction (HCC)    • Stroke (HCC)        Allergies   Allergen Reactions   • Sulfa Antibiotics Itching and Rash     Patient states she had the Worse yeast infection ever.  Reaction: rash  Patient states she had the Worse yeast infection ever.     •  Atorvastatin      Memory   • Levofloxacin    • Meclizine    • Morphine        Past Surgical History:   Procedure Laterality Date   • CARDIAC CATHETERIZATION Left 9/18/2022    Procedure: Left Heart Cath;  Surgeon: Ayaz Wild DO;  Location: [facility] CATH INVASIVE LOCATION;  Service: Cardiology;  Laterality: Left;   • CARDIAC CATHETERIZATION N/A 9/19/2022    Procedure: PERCUTANEOUS CORONARY INTERVENTION;  Surgeon: Ayaz Wild DO;  Location: [facility] CATH INVASIVE LOCATION;  Service: Cardiology;  Laterality: N/A;   • CATARACT EXTRACTION     • TRIGEMINAL NERVE DECOMPRESSION     • TUBAL ABDOMINAL LIGATION     • UVULOPALATOPHARYNGOPLASTY         History reviewed. No pertinent family history.    Social History     Socioeconomic History   • Marital status:    Tobacco Use   • Smoking status: Never   • Smokeless tobacco: Never   Substance and Sexual Activity   • Alcohol use: No   • Drug use: No   • Sexual activity: Never           Objective   Physical Exam  Vitals and nursing note reviewed.   Constitutional:       Appearance: She is well-developed.   HENT:      Head: Normocephalic and atraumatic.   Eyes:      Pupils: Pupils are equal, round, and reactive to light.   Neck:      Thyroid: No thyromegaly.      Vascular: No JVD.      Trachea: No tracheal deviation.   Cardiovascular:      Rate and Rhythm: Normal rate.      Pulses:           Radial pulses are 2+ on the right side and 2+ on the left side.      Heart sounds: Normal heart sounds, S1 normal and S2 normal.   Pulmonary:      Effort: Pulmonary effort is normal.      Breath sounds: Normal breath sounds.   Chest:      Chest wall: Tenderness (left chest. reproducible with palpation) present.   Abdominal:      General: Bowel sounds are normal.   Musculoskeletal:         General: Normal range of motion.   Skin:     General: Skin is warm and dry.      Capillary Refill: Capillary refill takes 2 to 3 seconds.   Neurological:      Mental Status: She is alert and oriented to  person, place, and time.      GCS: GCS eye subscore is 4. GCS verbal subscore is 5. GCS motor subscore is 6.   Psychiatric:         Speech: Speech normal.         Behavior: Behavior normal.         Thought Content: Thought content normal.         ECG 12 Lead      Date/Time: 1/9/2023 11:29 PM  Performed by: Ruth Soares MD  Authorized by: Ruth Soares MD   Interpreted by physician  Comparison: compared with previous ECG from 10/19/2022  Similar to previous ECG  Rhythm: sinus rhythm  Comments: Normal sinus rhythm ventricular rate 88 bpm,  ms,  ms, no change from prior EKG with T wave inversions noted in leads I, 2, aVL, V4, V5, V6.                 ED Course  ED Course as of 01/09/23 2330 Mon Jan 09, 2023 2039 Patient complains of left flank pain and hematuria.  Scan and labs ordered. [CLAIRE]      ED Course User Index  [CLAIRE] Ruth Soares MD                Vitals:    01/09/23 2006 01/09/23 2102 01/09/23 2202 01/09/23 2247   BP: 149/94 166/72 158/69 152/67   BP Location:       Patient Position:       Pulse: 66 72 68 68   Resp:       Temp:       TempSrc:       SpO2: 98% 97% 96% 96%   Weight:       Height:         Lab Results (last 24 hours)     Procedure Component Value Units Date/Time    Urinalysis, Microscopic Only - Urine, Clean Catch [[id number]]  (Abnormal) Collected: 01/09/23 2304    Specimen: Urine, Clean Catch Updated: 01/09/23 2316     RBC, UA Too Numerous to Count /HPF      WBC, UA Too Numerous to Count /HPF      Bacteria, UA 3+ /HPF      Squamous Epithelial Cells, UA 0-2 /HPF      Hyaline Casts, UA 0-2 /LPF      Methodology Automated Microscopy    Urinalysis With Microscopic If Indicated (No Culture) - Urine, Clean Catch [[id number]]  (Abnormal) Collected: 01/09/23 2304    Specimen: Urine, Clean Catch Updated: 01/09/23 2316     Color, UA Red     Appearance, UA Cloudy     pH, UA 7.0     Specific Gravity, UA 1.011     Glucose, UA Negative     Ketones, UA Negative     Bilirubin,  UA Negative     Blood, UA Large (3+)     Protein, UA 30 mg/dL (1+)     Leuk Esterase, UA Large (3+)     Nitrite, UA Negative     Urobilinogen, UA 0.2 E.U./dL    Extra Tubes [[id number]] Collected: 01/09/23 1846    Specimen: Blood, Venous Line Updated: 01/09/23 2001    Narrative:      The following orders were created for panel order Extra Tubes.  Procedure                               Abnormality         Status                     ---------                               -----------         ------                     Gold Top - SST[[id number]]                                   Final result               Light Blue Top[[id number]]                                   Final result                 Please view results for these tests on the individual orders.    Gold Top - SST [[id number]] Collected: 01/09/23 1846    Specimen: Blood Updated: 01/09/23 2001     Extra Tube Hold for add-ons.     Comment: Auto resulted.       Light Blue Top [[id number]] Collected: 01/09/23 1846    Specimen: Blood Updated: 01/09/23 2001     Extra Tube Hold for add-ons.     Comment: Auto resulted       Comprehensive Metabolic Panel [[id number]]  (Abnormal) Collected: 01/09/23 1846    Specimen: Blood Updated: 01/09/23 1912     Glucose 108 mg/dL      BUN 25 mg/dL      Creatinine 1.33 mg/dL      Sodium 138 mmol/L      Potassium 4.2 mmol/L      Chloride 103 mmol/L      CO2 27.0 mmol/L      Calcium 9.0 mg/dL      Total Protein 7.5 g/dL      Albumin 3.6 g/dL      ALT (SGPT) 9 U/L      AST (SGOT) 17 U/L      Alkaline Phosphatase 145 U/L      Total Bilirubin <0.2 mg/dL      Globulin 3.9 gm/dL      A/G Ratio 0.9 g/dL      BUN/Creatinine Ratio 18.8     Anion Gap 8.0 mmol/L      eGFR 40.0 mL/min/1.73      Comment: National Kidney Foundation and American Society of Nephrology (ASN) Task Force recommended calculation based on the Chronic Kidney Disease Epidemiology Collaboration (CKD-EPI) equation refit without adjustment for race.       Narrative:      GFR Normal  >60  Chronic Kidney Disease <60  Kidney Failure <15    The GFR formula is only valid for adults with stable renal function between ages 18 and 70.    Troponin [[id number]]  (Normal) Collected: 01/09/23 1846    Specimen: Blood Updated: 01/09/23 1912     Troponin T <0.010 ng/mL     Narrative:      Troponin T Reference Range:  <= 0.03 ng/mL-   Negative for AMI  >0.03 ng/mL-     Abnormal for myocardial necrosis.  Clinicians would have to utilize clinical acumen, EKG, Troponin and serial changes to determine if it is an Acute Myocardial Infarction or myocardial injury due to an underlying chronic condition.       Results may be falsely decreased if patient taking Biotin.      BNP [[id number]]  (Abnormal) Collected: 01/09/23 1846    Specimen: Blood Updated: 01/09/23 1910     proBNP 3,386.0 pg/mL     Narrative:      Among patients with dyspnea, NT-proBNP is highly sensitive for the detection of acute congestive heart failure. In addition NT-proBNP of <300 pg/ml effectively rules out acute congestive heart failure with 99% negative predictive value.      CBC & Differential [[id number]]  (Abnormal) Collected: 01/09/23 1846    Specimen: Blood Updated: 01/09/23 1850    Narrative:      The following orders were created for panel order CBC & Differential.  Procedure                               Abnormality         Status                     ---------                               -----------         ------                     CBC Auto Differential[[id number]]        Abnormal            Final result                 Please view results for these tests on the individual orders.    CBC Auto Differential [[id number]]  (Abnormal) Collected: 01/09/23 1846    Specimen: Blood Updated: 01/09/23 1850     WBC 8.03 10*3/mm3      RBC 3.83 10*6/mm3      Hemoglobin 11.2 g/dL      Hematocrit 34.7 %      MCV 90.6 fL      MCH 29.2 pg      MCHC 32.3 g/dL      RDW 14.3 %      RDW-SD 47.1 fl      MPV 8.8 fL      Platelets 225 10*3/mm3      Neutrophil %  59.3 %      Lymphocyte % 27.3 %      Monocyte % 8.6 %      Eosinophil % 3.5 %      Basophil % 0.9 %      Immature Grans % 0.4 %      Neutrophils, Absolute 4.77 10*3/mm3      Lymphocytes, Absolute 2.19 10*3/mm3      Monocytes, Absolute 0.69 10*3/mm3      Eosinophils, Absolute 0.28 10*3/mm3      Basophils, Absolute 0.07 10*3/mm3      Immature Grans, Absolute 0.03 10*3/mm3      nRBC 0.0 /100 WBC         XR Chest 1 View    Result Date: 1/9/2023  No acute disease. Electronically signed by:  Edmar Hubbard MD  1/9/2023 7:44 PM CST Workstation: [id number]    CT Abdomen Pelvis Stone Protocol    Result Date: 1/9/2023  Bibasilar multifocal airspace processes concerning for multifocal pneumonia. Atrophy of the right kidney with 6.1 cm right parapelvic cyst. No evidence of obstructive uropathy on either side. No evidence of bowel obstruction or perienteric inflammation. Correlation with patient's symptoms and history is recommended.  Electronically signed by:  Jose Wolfe MD  1/9/2023 9:53 PM CST Workstation: [id number]                               Medical Decision Making  Mildly demented patient with complaints of chest pain which is reproducible, hematuria and left flank pain.  EKG without acute changes and consistent with previous EKGs, labs unremarkable and consistent with chronic medical conditions with the exception of urine significant for acute cystitis.  CT scan shows bilateral possible multilevel pneumonia.  We will treat pneumonia and UTI with antibiotics which will be initiated today and Rx sent for same.    Acute cystitis with hematuria: chronic illness or injury  Pneumonia of both lungs due to infectious organism, unspecified part of lung: acute illness or injury  Amount and/or Complexity of Data Reviewed  Labs: ordered.  Radiology: ordered.  ECG/medicine tests: ordered and independent interpretation performed.      Risk  Prescription drug management.          Final diagnoses:   Acute cystitis with hematuria    Pneumonia of both lungs due to infectious organism, unspecified part of lung       ED Disposition  ED Disposition     ED Disposition   Discharge    Condition   Stable    Comment   --             Sean Briones MD  [address] [facility] [phone]    Call in 1 day  for follow up         Medication List      New Prescriptions    azithromycin 250 MG tablet  Commonly known as: ZITHROMAX  Take 1 tablet by mouth Daily for 4 days.  Start taking on: January 10, 2023     cefdinir 300 MG capsule  Commonly known as: OMNICEF  Take 1 capsule by mouth 2 (Two) Times a Day for 7 days.  Start taking on: January 10, 2023           Where to Get Your Medications      These medications were sent to [facility] [place] [place] - [address] [place] [address] - [phone]  - [phone] [place] [id number]    Phone: [phone]   · azithromycin 250 MG tablet  · cefdinir 300 MG capsule       This document has been electronically signed by Ruth Soares MD on January 9, 2023 23:32 CST    Ruth Soares MD   Part of this note may be an electronic transcription/translation of spoken language to printed text using the Dragon Dictation System.        Ruth Soares MD  01/09/23 [id number]

## 2023-01-10 NOTE — ED NOTES
Attempted to call son again, was able to get a hold of daughter she is going to try and get a ride for pt

## 2023-01-10 NOTE — ED NOTES
Ambulated patient to bathroom. Bright red blood was on toilet paper. Pt states she has not been having blood when she wipes, and there was no blood in her brief. Pt complains of having back pain.    MD and RN made aware.

## 2023-01-10 NOTE — DISCHARGE INSTRUCTIONS
Increase your fluids.  Take your medications as directed.  Call your primary care for immediate follow-up.  Return to ED as needed.

## 2023-01-13 LAB — BACTERIA SPEC AEROBE CULT: ABNORMAL

## 2023-04-17 ENCOUNTER — OFFICE VISIT (OUTPATIENT)
Dept: CARDIOLOGY | Facility: CLINIC | Age: 83
End: 2023-04-17
Payer: MEDICARE

## 2023-04-17 VITALS
WEIGHT: 188.5 LBS | SYSTOLIC BLOOD PRESSURE: 138 MMHG | DIASTOLIC BLOOD PRESSURE: 82 MMHG | HEART RATE: 72 BPM | HEIGHT: 62 IN | OXYGEN SATURATION: 98 % | BODY MASS INDEX: 34.69 KG/M2

## 2023-04-17 DIAGNOSIS — E78.2 HYPERLIPEMIA, MIXED: ICD-10-CM

## 2023-04-17 DIAGNOSIS — I25.83 CORONARY ARTERY DISEASE DUE TO LIPID RICH PLAQUE: ICD-10-CM

## 2023-04-17 DIAGNOSIS — I10 HYPERTENSION, ESSENTIAL: Primary | ICD-10-CM

## 2023-04-17 DIAGNOSIS — I25.10 CORONARY ARTERY DISEASE DUE TO LIPID RICH PLAQUE: ICD-10-CM

## 2023-04-17 RX ORDER — ACETAMINOPHEN 325 MG/1
650 TABLET ORAL EVERY 6 HOURS PRN
COMMUNITY

## 2023-04-17 RX ORDER — NITROGLYCERIN 0.4 MG/1
TABLET SUBLINGUAL
COMMUNITY
Start: 2023-01-10

## 2023-04-17 RX ORDER — IBUPROFEN 600 MG/1
1 TABLET ORAL ONCE AS NEEDED
COMMUNITY

## 2023-04-17 RX ORDER — POLYETHYLENE GLYCOL 3350 17 G/17G
17 POWDER, FOR SOLUTION ORAL DAILY PRN
COMMUNITY

## 2023-04-17 NOTE — PROGRESS NOTES
[facility] Cardiology  OFFICE NOTE    Cardiovascular Medicine  Matt Black M.D., [facility], [facility], [facility]         No referring provider defined for this encounter.    Thank you for asking me to see Magy Barton for CAD.    History of Present Illness  This is a 82 y.o. female with:    1.  Coronary artery disease  2.  Diabetes  3.  Hypertension  4.  Mild aortic stenosis    Magy Barton is a 82 y.o. female who presents for consultation today.  Patient presented with NSTEMI last month.  Underwent PCI to OM which was the culprit lesion for presentation.  Also underwent PCI to her left circumflex.  Had PCI to LAD in the index procedure.  She had critical stenosis in the RCA for which she underwent staged PCI.  Moderate residual disease in the proximal RCA.   She has been on aspirin/Brilinta/Crestor/Coreg  Echocardiogram showed mild reduced LV systolic function.    She is at nursing home.  In wheelchair today.  Denying any chest pain or shortness of breath.  No bleeding problems for antiplatelet.    04/17/2023:  No acute issues since last visit.  Denying any chest pain or shortness of breath.  In nursing home.  Is able to perform her activities of daily living without any limitations.      Review of Systems - ROS  Constitution: Negative for weakness, weight gain and weight loss.   HENT: Negative for congestion.    Eyes: Negative for blurred vision.   Cardiovascular: As mentioned above  Respiratory: Negative for cough and hemoptysis.    Endocrine: Negative for polydipsia and polyuria.   Hematologic/Lymphatic: Negative for bleeding problem. Does not bruise/bleed easily.   Skin: Negative for flushing.   Musculoskeletal: Negative for neck pain and stiffness.   Gastrointestinal: Negative for abdominal pain, diarrhea, jaundice, melena, nausea and vomiting.   Genitourinary: Negative for dysuria and hematuria.   Neurological: Negative for dizziness, focal weakness and numbness.   Psychiatric/Behavioral: Negative  for altered mental status and depression.     I reviewed the ROS as documented here and confirmed the accuracy of it with the patient today. 4/17/2023        All other systems were reviewed and were negative.    family history is not on file.     reports that she has never smoked. She has never used smokeless tobacco. She reports that she does not drink alcohol and does not use drugs.    Allergies   Allergen Reactions   • Sulfa Antibiotics Itching and Rash     Patient states she had the Worse yeast infection ever.  Reaction: rash  Patient states she had the Worse yeast infection ever.     • Atorvastatin      Memory   • Levofloxacin    • Meclizine    • Morphine          Current Outpatient Medications:   •  amLODIPine (NORVASC) 10 MG tablet, Take 10 mg by mouth Every Night., Disp: , Rfl:   •  Aspirin (ASPIR-81 PO), Take 81 mg by mouth Daily., Disp: , Rfl:   •  carBAMazepine (TEGretol) 200 MG tablet, Take 200 mg by mouth 2 (Two) Times a Day., Disp: , Rfl:   •  carvedilol (COREG) 12.5 MG tablet, Take 1 tablet by mouth 2 (Two) Times a Day With Meals., Disp: 60 tablet, Rfl: 5  •  Insulin Glargine (BASAGLAR KWIKPEN) 100 UNIT/ML injection pen, Inject  under the skin into the appropriate area as directed., Disp: , Rfl:   •  lisinopril (PRINIVIL,ZESTRIL) 40 MG tablet, Take 40 mg by mouth Daily., Disp: , Rfl:   •  melatonin 3 MG tablet, Take 3 mg by mouth Every Night. For insomnia, Disp: , Rfl:   •  Mirabegron ER (MYRBETRIQ) 50 MG tablet sustained-release 24 hour 24 hr tablet, Take 50 mg by mouth Daily., Disp: , Rfl:   •  rosuvastatin (CRESTOR) 20 MG tablet, Take 1 tablet by mouth Every Night., Disp: 90 tablet, Rfl: 5  •  ticagrelor (BRILINTA) 90 MG tablet tablet, Take 1 tablet by mouth 2 (Two) Times a Day., Disp: 60 tablet, Rfl: 11  •  traMADol (ULTRAM) 50 MG tablet, Take 1 tablet by mouth Every 6 (Six) Hours As Needed for Moderate Pain., Disp: , Rfl:   •  venlafaxine (EFFEXOR) 37.5 MG tablet, Take 37.5 mg by mouth 2 (Two)  "Times a Day., Disp: , Rfl:     Physical Exam:  Vitals:    04/17/23 1320   BP: 138/82   BP Location: Right arm   Patient Position: Sitting   Cuff Size: Adult   Pulse: 72   SpO2: 98%   Weight: 85.5 kg (188 lb 8 oz)   Height: 157.5 cm (62\")   PainSc: 0-No pain   PainLoc: Chest     Current Pain Level: none  Pulse Ox: Normal  on room air  General: alert, appears stated age and cooperative     Body Habitus: well-nourished    HEENT: Head: Normocephalic, no lesions, without obvious abnormality. No arcus senilis, xanthelasma or xanthomas.    Neuro: alert, oriented x3  Pulses: 2+ and symmetric  JVP: Volume/Pulsation: Normal.  Normal waveforms.   Appropriate inspiratory decrease.  No Kussmaul's. No Ramon's.   Carotid Exam: no bruit normal pulsation bilaterally   Carotid Volume: normal.     Respirations: no increased work of breathing   Chest:  Normal    Pulmonary:Normal   Precordium: Normal impulses. P2 is not palpable.  RV Heave: absent  LV Heave: absent  [place]:  normal size and placement  Palpable S4: absent.  Heart rate: normal    Heart Rhythm: regular     Heart Sounds: S1: normal  S2: normal  S3: absent   S4: absent  Opening Snap: absent    Pericardial Rub:  Absent: .    Abdomen:   Appearance: normal .  Palpation: Soft, non-tender to palpation, bowel sounds positive in all four quadrants; no guarding or rebound tenderness  Extremity: no edema.   LE Skin: no rashes  LE Hair:  normal  LE Pulses: well perfused with normal pulses in the distal extremities  Pallor on elevation: Absent. Rubor on dependency: None    I have reexamined the patient and the results are consistent with the previously documented exam. Matt Black MD     DATA REVIEWED:     EKG. I personally reviewed and interpreted the EKG.  Sinus rhythm, T wave inversions lateral leads    ECG/EMG Results (all)     Procedure Component Value Units Date/Time    ECG 12 Lead [[id number]] Collected: 10/19/22 1114     Updated: 10/19/22 1120     QT Interval 392 ms      QTC " Interval 441 ms     Narrative:      Test Reason : NSTEMI  Blood Pressure :   */*   mmHG  Vent. Rate :  76 BPM     Atrial Rate :  76 BPM     P-R Int : 158 ms          QRS Dur : 112 ms      QT Int : 392 ms       P-R-T Axes :  95  51 120 degrees     QTc Int : 441 ms    Normal sinus rhythm  T wave abnormality, consider lateral ischemia  Abnormal ECG  When compared with ECG of 16-OCT-2022 11:28,  No significant change was found    Referred By:            Confirmed By:         ---------------------------------------------------  TTE/RJ:  Results for orders placed during the hospital encounter of 09/18/22    Adult Transthoracic Echo Complete w/ Color, Spectral and Contrast if Necessary Per Protocol    Interpretation Summary  · Left ventricular wall thickness is consistent with moderate to severe septal asymmetric hypertrophy.  · Left ventricular ejection fraction appears to be 41 - 45%. Left ventricular systolic function is moderately decreased.  · Left ventricular diastolic function is consistent with age.  · Mild aortic valve stenosis is present.    .-----------------------------------------------------  CT:   No radiology results for the last 30 days.  I        --------------------------------------------------------------------------------------------------  LABS:     The CVD Risk score (Susan et al., 2008) failed to calculate for the following reasons:    The 2008 CVD risk score is only valid for ages 30 to 74    The patient has a prior MI, stroke, CHF, or peripheral vascular disease diagnosis         Lab Results   Component Value Date    GLUCOSE 108 (H) 01/09/2023    BUN 25 (H) 01/09/2023    CREATININE 1.33 (H) 01/09/2023    EGFRIFNONA 42 (L) 02/27/2022    EGFRIFAFRI 73 03/26/2019    BCR 18.8 01/09/2023    K 4.2 01/09/2023    CO2 27.0 01/09/2023    CALCIUM 9.0 01/09/2023    ALBUMIN 3.6 01/09/2023    AST 17 01/09/2023    ALT 9 01/09/2023     Lab Results   Component Value Date    WBC 8.03 01/09/2023    HGB  11.2 (L) 01/09/2023    HCT 34.7 01/09/2023    MCV 90.6 01/09/2023     01/09/2023     Lab Results   Component Value Date    CHOL 219 (H) 01/07/2020    CHLPL 194 03/22/2019    TRIG 251 (H) 01/07/2020    HDL 41 01/07/2020     (H) 01/07/2020     Lab Results   Component Value Date    TSH 2.81 03/21/2019     Lab Results   Component Value Date    CKTOTAL 391 (H) 09/18/2022    TROPONINI 0.02 03/21/2019    TROPONINT <0.010 01/09/2023     Lab Results   Component Value Date    HGBA1C 9.7 (H) 03/01/2021     No results found for: DDIMER  Lab Results   Component Value Date    ALT 9 01/09/2023     Lab Results   Component Value Date    HGBA1C 9.7 (H) 03/01/2021    HGBA1C 6.7 (H) 08/31/2020    HGBA1C 8.4 (H) 01/07/2020     Lab Results   Component Value Date    CREATININE 1.33 (H) 01/09/2023     No results found for: IRON, TIBC, FERRITIN  Lab Results   Component Value Date    INR 1.04 09/18/2022    INR 1.06 02/27/2022    PROTIME 13.5 09/18/2022    PROTIME 13.7 02/27/2022       [unfilled]    1.  Coronary artery disease:  Extensive history of coronary artery disease with multiple PCI's recently.  On appropriate medical therapy with aspirin/Brilinta/Coreg/Crestor and amlodipine and lisinopril.  Echocardiogram with mildly reduced LV systolic function.    2.  Mild reduced LV systolic function:  In the setting of recent NSTEMI with multivessel PCI.  On appropriate medical therapy with lisinopril and Coreg.  Appears euvolemic.    3.  Hyperlipidemia:  On Crestor 20 mg.  LDL is 120.      Prevention:  BMI is >= 30 and <35. (Class 1 Obesity). The following options were offered after discussion;: exercise counseling/recommendations      Magy Barton  reports that she has never smoked. She has never used smokeless tobacco..            This document has been electronically signed by Matt Black MD on April 17, 2023 13:23 CDT

## 2023-07-29 ENCOUNTER — LAB REQUISITION (OUTPATIENT)
Dept: LAB | Facility: HOSPITAL | Age: 83
End: 2023-07-29
Payer: MEDICARE

## 2023-07-29 DIAGNOSIS — R82.90 UNSPECIFIED ABNORMAL FINDINGS IN URINE: ICD-10-CM

## 2023-07-29 LAB
BACTERIA UR QL AUTO: ABNORMAL /HPF
BILIRUB UR QL STRIP: NEGATIVE
CLARITY UR: ABNORMAL
COLOR UR: YELLOW
GLUCOSE UR STRIP-MCNC: NEGATIVE MG/DL
HGB UR QL STRIP.AUTO: ABNORMAL
HYALINE CASTS UR QL AUTO: ABNORMAL /LPF
KETONES UR QL STRIP: NEGATIVE
LEUKOCYTE ESTERASE UR QL STRIP.AUTO: ABNORMAL
NITRITE UR QL STRIP: NEGATIVE
PH UR STRIP.AUTO: 5.5 [PH] (ref 5–9)
PROT UR QL STRIP: NEGATIVE
RBC # UR STRIP: ABNORMAL /HPF
REF LAB TEST METHOD: ABNORMAL
SP GR UR STRIP: 1 (ref 1–1.03)
SQUAMOUS #/AREA URNS HPF: ABNORMAL /HPF
UROBILINOGEN UR QL STRIP: ABNORMAL
WBC # UR STRIP: ABNORMAL /HPF

## 2023-07-29 PROCEDURE — 87086 URINE CULTURE/COLONY COUNT: CPT | Performed by: FAMILY MEDICINE

## 2023-07-29 PROCEDURE — 81001 URINALYSIS AUTO W/SCOPE: CPT | Performed by: FAMILY MEDICINE

## 2023-07-29 PROCEDURE — 87077 CULTURE AEROBIC IDENTIFY: CPT | Performed by: FAMILY MEDICINE

## 2023-07-29 PROCEDURE — 87186 SC STD MICRODIL/AGAR DIL: CPT | Performed by: FAMILY MEDICINE

## 2023-07-31 LAB — BACTERIA SPEC AEROBE CULT: ABNORMAL

## 2023-08-10 ENCOUNTER — APPOINTMENT (OUTPATIENT)
Dept: CT IMAGING | Facility: HOSPITAL | Age: 83
DRG: 309 | End: 2023-08-10
Payer: MEDICARE

## 2023-08-10 ENCOUNTER — HOSPITAL ENCOUNTER (INPATIENT)
Facility: HOSPITAL | Age: 83
LOS: 4 days | Discharge: NURSING, REHAB OR LONG-TERM CARE | DRG: 309 | End: 2023-08-16
Attending: FAMILY MEDICINE | Admitting: INTERNAL MEDICINE
Payer: MEDICARE

## 2023-08-10 DIAGNOSIS — N13.5 UPJ OBSTRUCTION, ACQUIRED: ICD-10-CM

## 2023-08-10 DIAGNOSIS — Z74.09 IMPAIRED FUNCTIONAL MOBILITY, BALANCE, GAIT, AND ENDURANCE: ICD-10-CM

## 2023-08-10 DIAGNOSIS — N28.1 ACQUIRED RENAL CYST OF RIGHT KIDNEY: ICD-10-CM

## 2023-08-10 DIAGNOSIS — I48.91 ATRIAL FIBRILLATION WITH RAPID VENTRICULAR RESPONSE: Primary | ICD-10-CM

## 2023-08-10 LAB
ALBUMIN SERPL-MCNC: 3.4 G/DL (ref 3.5–5.2)
ALBUMIN/GLOB SERPL: 0.7 G/DL
ALP SERPL-CCNC: 168 U/L (ref 39–117)
ALT SERPL W P-5'-P-CCNC: 13 U/L (ref 1–33)
ANION GAP SERPL CALCULATED.3IONS-SCNC: 14 MMOL/L (ref 5–15)
AST SERPL-CCNC: 25 U/L (ref 1–32)
BACTERIA UR QL AUTO: ABNORMAL /HPF
BASOPHILS # BLD AUTO: 0.13 10*3/MM3 (ref 0–0.2)
BASOPHILS NFR BLD AUTO: 1.2 % (ref 0–1.5)
BILIRUB SERPL-MCNC: 0.2 MG/DL (ref 0–1.2)
BILIRUB UR QL STRIP: NEGATIVE
BUN SERPL-MCNC: 20 MG/DL (ref 8–23)
BUN/CREAT SERPL: 14 (ref 7–25)
CALCIUM SPEC-SCNC: 8.7 MG/DL (ref 8.6–10.5)
CHLORIDE SERPL-SCNC: 102 MMOL/L (ref 98–107)
CLARITY UR: ABNORMAL
CO2 SERPL-SCNC: 23 MMOL/L (ref 22–29)
COLOR UR: YELLOW
CREAT SERPL-MCNC: 1.43 MG/DL (ref 0.57–1)
D-LACTATE SERPL-SCNC: 1.2 MMOL/L (ref 0.5–2)
DEPRECATED RDW RBC AUTO: 46.6 FL (ref 37–54)
EGFRCR SERPLBLD CKD-EPI 2021: 36.7 ML/MIN/1.73
EOSINOPHIL # BLD AUTO: 0.11 10*3/MM3 (ref 0–0.4)
EOSINOPHIL NFR BLD AUTO: 1 % (ref 0.3–6.2)
ERYTHROCYTE [DISTWIDTH] IN BLOOD BY AUTOMATED COUNT: 14.2 % (ref 12.3–15.4)
GEN 5 2HR TROPONIN T REFLEX: 79 NG/L
GLOBULIN UR ELPH-MCNC: 4.9 GM/DL
GLUCOSE SERPL-MCNC: 109 MG/DL (ref 65–99)
GLUCOSE UR STRIP-MCNC: NEGATIVE MG/DL
HCT VFR BLD AUTO: 35.9 % (ref 34–46.6)
HGB BLD-MCNC: 11.8 G/DL (ref 12–15.9)
HGB UR QL STRIP.AUTO: NEGATIVE
HOLD SPECIMEN: NORMAL
HOLD SPECIMEN: NORMAL
HYALINE CASTS UR QL AUTO: ABNORMAL /LPF
IMM GRANULOCYTES # BLD AUTO: 0.04 10*3/MM3 (ref 0–0.05)
IMM GRANULOCYTES NFR BLD AUTO: 0.4 % (ref 0–0.5)
KETONES UR QL STRIP: ABNORMAL
LEUKOCYTE ESTERASE UR QL STRIP.AUTO: ABNORMAL
LIPASE SERPL-CCNC: 19 U/L (ref 13–60)
LYMPHOCYTES # BLD AUTO: 1.41 10*3/MM3 (ref 0.7–3.1)
LYMPHOCYTES NFR BLD AUTO: 12.8 % (ref 19.6–45.3)
MCH RBC QN AUTO: 29.9 PG (ref 26.6–33)
MCHC RBC AUTO-ENTMCNC: 32.9 G/DL (ref 31.5–35.7)
MCV RBC AUTO: 90.9 FL (ref 79–97)
MONOCYTES # BLD AUTO: 0.8 10*3/MM3 (ref 0.1–0.9)
MONOCYTES NFR BLD AUTO: 7.3 % (ref 5–12)
NEUTROPHILS NFR BLD AUTO: 77.3 % (ref 42.7–76)
NEUTROPHILS NFR BLD AUTO: 8.49 10*3/MM3 (ref 1.7–7)
NITRITE UR QL STRIP: NEGATIVE
NRBC BLD AUTO-RTO: 0 /100 WBC (ref 0–0.2)
NT-PROBNP SERPL-MCNC: 9468 PG/ML (ref 0–1800)
PH UR STRIP.AUTO: 7 [PH] (ref 5–9)
PLATELET # BLD AUTO: 244 10*3/MM3 (ref 140–450)
PMV BLD AUTO: 9.2 FL (ref 6–12)
POTASSIUM SERPL-SCNC: 4.5 MMOL/L (ref 3.5–5.2)
PROT SERPL-MCNC: 8.3 G/DL (ref 6–8.5)
PROT UR QL STRIP: ABNORMAL
QT INTERVAL: 358 MS
QTC INTERVAL: 499 MS
RBC # BLD AUTO: 3.95 10*6/MM3 (ref 3.77–5.28)
RBC # UR STRIP: ABNORMAL /HPF
REF LAB TEST METHOD: ABNORMAL
SODIUM SERPL-SCNC: 139 MMOL/L (ref 136–145)
SP GR UR STRIP: 1.01 (ref 1–1.03)
SQUAMOUS #/AREA URNS HPF: ABNORMAL /HPF
TROPONIN T DELTA: 8 NG/L
TROPONIN T SERPL HS-MCNC: 71 NG/L
UROBILINOGEN UR QL STRIP: ABNORMAL
WBC # UR STRIP: ABNORMAL /HPF
WBC NRBC COR # BLD: 10.98 10*3/MM3 (ref 3.4–10.8)
WHOLE BLOOD HOLD COAG: NORMAL
WHOLE BLOOD HOLD SPECIMEN: NORMAL

## 2023-08-10 PROCEDURE — 74176 CT ABD & PELVIS W/O CONTRAST: CPT

## 2023-08-10 PROCEDURE — 84484 ASSAY OF TROPONIN QUANT: CPT | Performed by: INTERNAL MEDICINE

## 2023-08-10 PROCEDURE — 81001 URINALYSIS AUTO W/SCOPE: CPT | Performed by: FAMILY MEDICINE

## 2023-08-10 PROCEDURE — 36415 COLL VENOUS BLD VENIPUNCTURE: CPT

## 2023-08-10 PROCEDURE — 80053 COMPREHEN METABOLIC PANEL: CPT

## 2023-08-10 PROCEDURE — G0378 HOSPITAL OBSERVATION PER HR: HCPCS

## 2023-08-10 PROCEDURE — 93005 ELECTROCARDIOGRAM TRACING: CPT | Performed by: FAMILY MEDICINE

## 2023-08-10 PROCEDURE — 83690 ASSAY OF LIPASE: CPT

## 2023-08-10 PROCEDURE — 85025 COMPLETE CBC W/AUTO DIFF WBC: CPT

## 2023-08-10 PROCEDURE — 99285 EMERGENCY DEPT VISIT HI MDM: CPT

## 2023-08-10 PROCEDURE — 83605 ASSAY OF LACTIC ACID: CPT

## 2023-08-10 PROCEDURE — 87086 URINE CULTURE/COLONY COUNT: CPT | Performed by: INTERNAL MEDICINE

## 2023-08-10 PROCEDURE — 93005 ELECTROCARDIOGRAM TRACING: CPT

## 2023-08-10 PROCEDURE — 93010 ELECTROCARDIOGRAM REPORT: CPT | Performed by: INTERNAL MEDICINE

## 2023-08-10 PROCEDURE — 83880 ASSAY OF NATRIURETIC PEPTIDE: CPT | Performed by: INTERNAL MEDICINE

## 2023-08-10 RX ORDER — SODIUM CHLORIDE 0.9 % (FLUSH) 0.9 %
10 SYRINGE (ML) INJECTION AS NEEDED
Status: DISCONTINUED | OUTPATIENT
Start: 2023-08-10 | End: 2023-08-16 | Stop reason: HOSPADM

## 2023-08-10 RX ORDER — DILTIAZEM HYDROCHLORIDE 5 MG/ML
10 INJECTION INTRAVENOUS ONCE
Status: COMPLETED | OUTPATIENT
Start: 2023-08-10 | End: 2023-08-10

## 2023-08-10 RX ADMIN — DILTIAZEM HYDROCHLORIDE 10 MG: 5 INJECTION INTRAVENOUS at 23:23

## 2023-08-10 RX ADMIN — SODIUM CHLORIDE 5 MG/HR: 900 INJECTION, SOLUTION INTRAVENOUS at 23:25

## 2023-08-10 NOTE — ED PROVIDER NOTES
Subjective   History of Present Illness    Patient presents to the emergency department from the nursing home to be evaluated for possible kidney stones.  Upon arrival patient is found to be in atrial fibrillation with rapid ventricular response at a rate of 132.  This is a new finding.      Abdominal Pain  Pain location:  RLQ  Pain quality: aching    Pain severity:  Mild  Relieved by:  Nothing  Worsened by:  Nothing  Associated symptoms: no chest pain, no chills, no cough, no diarrhea, no dysuria, no fatigue, no fever, no nausea, no shortness of breath, no sore throat and no vomiting      Review of Systems   Constitutional:  Negative for appetite change, chills, diaphoresis, fatigue and fever.   HENT:  Negative for congestion, ear discharge, ear pain, nosebleeds, rhinorrhea, sinus pressure, sore throat and trouble swallowing.    Eyes:  Negative for discharge and redness.   Respiratory:  Negative for apnea, cough, chest tightness, shortness of breath and wheezing.    Cardiovascular:  Positive for palpitations. Negative for chest pain.   Gastrointestinal:  Positive for abdominal pain. Negative for diarrhea, nausea and vomiting.   Endocrine: Negative for polyuria.   Genitourinary:  Negative for dysuria, frequency and urgency.   Musculoskeletal:  Negative for myalgias and neck pain.   Skin:  Negative for color change and rash.   Allergic/Immunologic: Negative for immunocompromised state.   Neurological:  Negative for dizziness, seizures, syncope, weakness, light-headedness and headaches.   Hematological:  Negative for adenopathy. Does not bruise/bleed easily.   Psychiatric/Behavioral:  Negative for behavioral problems and confusion.    All other systems reviewed and are negative.    Past Medical History:   Diagnosis Date    Cancer 2009    leukemia    Coronary artery disease     Diabetes mellitus     Hypertension     Injury of back     Myocardial infarction     Stroke        Allergies   Allergen Reactions    Sulfa  Antibiotics Itching and Rash     Patient states she had the Worse yeast infection ever.  Reaction: rash  Patient states she had the Worse yeast infection ever.      Atorvastatin      Memory    Levofloxacin     Meclizine     Morphine        Past Surgical History:   Procedure Laterality Date    CARDIAC CATHETERIZATION Left 9/18/2022    Procedure: Left Heart Cath;  Surgeon: Ayaz Wild DO;  Location: [facility] CATH INVASIVE LOCATION;  Service: Cardiology;  Laterality: Left;    CARDIAC CATHETERIZATION N/A 9/19/2022    Procedure: PERCUTANEOUS CORONARY INTERVENTION;  Surgeon: Ayaz Wild DO;  Location: [facility] CATH INVASIVE LOCATION;  Service: Cardiology;  Laterality: N/A;    CATARACT EXTRACTION      TRIGEMINAL NERVE DECOMPRESSION      TUBAL ABDOMINAL LIGATION      UVULOPALATOPHARYNGOPLASTY         History reviewed. No pertinent family history.    Social History     Socioeconomic History    Marital status:    Tobacco Use    Smoking status: Never    Smokeless tobacco: Never   Substance and Sexual Activity    Alcohol use: No    Drug use: No    Sexual activity: Never           Objective   Physical Exam  Vitals and nursing note reviewed.   Constitutional:       Appearance: She is well-developed.   HENT:      Head: Normocephalic and atraumatic.      Nose: Nose normal.   Eyes:      General: No scleral icterus.        Right eye: No discharge.         Left eye: No discharge.      Conjunctiva/sclera: Conjunctivae normal.      Pupils: Pupils are equal, round, and reactive to light.   Neck:      Trachea: No tracheal deviation.   Cardiovascular:      Rate and Rhythm: Tachycardia present. Rhythm regularly irregular.      Heart sounds: Normal heart sounds. No murmur heard.  Pulmonary:      Effort: Pulmonary effort is normal. No respiratory distress.      Breath sounds: Normal breath sounds. No stridor. No wheezing or rales.   Abdominal:      General: Bowel sounds are normal. There is no distension.      Palpations: Abdomen is  soft. There is no mass.      Tenderness: There is no abdominal tenderness in the right lower quadrant. There is no guarding or rebound.   Musculoskeletal:      Cervical back: Normal range of motion and neck supple.   Skin:     General: Skin is warm and dry.      Findings: No erythema or rash.   Neurological:      Mental Status: She is alert and oriented to person, place, and time.      Coordination: Coordination normal.   Psychiatric:         Behavior: Behavior normal.         Thought Content: Thought content normal.       ECG 12 Lead      Date/Time: 8/10/2023 10:04 PM  Performed by: James Golden MD  Authorized by: James Golden MD   Interpreted by physician  Rhythm: sinus tachycardia  BPM: 117  ST Segments: ST segments normal             ED Course           Labs Reviewed   COMPREHENSIVE METABOLIC PANEL - Abnormal; Notable for the following components:       Result Value    Glucose 109 (*)     Creatinine 1.43 (*)     Albumin 3.4 (*)     Alkaline Phosphatase 168 (*)     eGFR 36.7 (*)     All other components within normal limits    Narrative:     GFR Normal >60  Chronic Kidney Disease <60  Kidney Failure <15    The GFR formula is only valid for adults with stable renal function between ages 18 and 70.   URINALYSIS W/ MICROSCOPIC IF INDICATED (NO CULTURE) - Abnormal; Notable for the following components:    Appearance, UA Cloudy (*)     Ketones, UA 15 mg/dL (1+) (*)     Protein, UA Trace (*)     Leuk Esterase, UA Trace (*)     All other components within normal limits   CBC WITH AUTO DIFFERENTIAL - Abnormal; Notable for the following components:    WBC 10.98 (*)     Hemoglobin 11.8 (*)     Neutrophil % 77.3 (*)     Lymphocyte % 12.8 (*)     Neutrophils, Absolute 8.49 (*)     All other components within normal limits   URINALYSIS, MICROSCOPIC ONLY - Abnormal; Notable for the following components:    RBC, UA 0-2 (*)     Bacteria, UA 1+ (*)     Squamous Epithelial Cells, UA 3-5 (*)     All other  components within normal limits   LIPASE - Normal   LACTIC ACID, PLASMA - Normal   RAINBOW DRAW    Narrative:     The following orders were created for panel order [place] Draw.  Procedure                               Abnormality         Status                     ---------                               -----------         ------                     Green Top (Gel)[[id number]]                                  Final result               Lavender Top[[id number]]                                     Final result               Gold Top - SST[[id number]]                                   Final result               Light Blue Top[[id number]]                                   Final result                 Please view results for these tests on the individual orders.   TROPONIN   BNP (IN-HOUSE)   TROPONIN   CBC AND DIFFERENTIAL    Narrative:     The following orders were created for panel order CBC & Differential.  Procedure                               Abnormality         Status                     ---------                               -----------         ------                     CBC Auto Differential[[id number]]        Abnormal            Final result                 Please view results for these tests on the individual orders.   GREEN TOP   LAVENDER TOP   GOLD TOP - SST   LIGHT BLUE TOP       CT Abdomen Pelvis Without Contrast   Final Result   1.  There is redemonstration of a large cystic structure in the region of the   right renal pelvis.  It is uncertain if this represents a large renal cyst or   peripelvic renal cysts or if this may represent a dilated renal pelvis in the   setting of UPJ obstruction.  There does appear to be a component of   hydronephrosis on today's exam with dilatation of renal calyces.  This was not   seen on the prior exam.  As a result, this represents UPJ obstruction, this is   presumably worsening.  Alternatively, this could represent a peripelvic cyst   with interval development of  right-sided hydronephrosis due to ureteral   obstruction, stricture or lesion.  There is no discrete ureteral stone   identified.      2.  Small bilateral pleural effusions.      Additional findings as described above.                                            Medical Decision Making  Amount and/or Complexity of Data Reviewed  Labs: ordered.  Radiology: ordered.  ECG/medicine tests: ordered.    Risk  Prescription drug management.  Decision regarding hospitalization.        Final diagnoses:   Atrial fibrillation with rapid ventricular response   Acquired renal cyst of right kidney   UPJ obstruction, acquired       ED Disposition  ED Disposition       ED Disposition   Decision to Admit    Condition   --    Comment   Level of Care: Stepdown [25]   Diagnosis: Atrial fibrillation with RVR [043384]   Admitting Physician: BEHROOZI, SAEID [[id number]]   Attending Physician: BEHROOZI, SAEID [[id number]]                 No follow-up provider specified.       Medication List      No changes were made to your prescriptions during this visit.            James Golden MD  08/10/23 [id number]

## 2023-08-10 NOTE — ED NOTES
Pt arrives via EMS from [place] with c/o abdominal pain/flank pain. Per EMS staff said pt has a kidney stone.

## 2023-08-10 NOTE — Clinical Note
Level of Care: Stepdown [25]   Diagnosis: Atrial fibrillation with rapid ventricular response [330285]   Admitting Physician: BEHROOZI, SAEID [[id number]]   Attending Physician: BEHROOZI, SAEID [[id number]]   Certification: I Certify That Inpatient Hospital Services Are Medically Necessary For Greater Than 2 Midnights

## 2023-08-11 ENCOUNTER — APPOINTMENT (OUTPATIENT)
Dept: ULTRASOUND IMAGING | Facility: HOSPITAL | Age: 83
DRG: 309 | End: 2023-08-11
Payer: MEDICARE

## 2023-08-11 ENCOUNTER — APPOINTMENT (OUTPATIENT)
Dept: CARDIOLOGY | Facility: HOSPITAL | Age: 83
DRG: 309 | End: 2023-08-11
Payer: MEDICARE

## 2023-08-11 ENCOUNTER — APPOINTMENT (OUTPATIENT)
Dept: INTERVENTIONAL RADIOLOGY/VASCULAR | Facility: HOSPITAL | Age: 83
DRG: 309 | End: 2023-08-11
Payer: MEDICARE

## 2023-08-11 PROBLEM — I48.91 A-FIB: Status: ACTIVE | Noted: 2023-08-11

## 2023-08-11 LAB
ANION GAP SERPL CALCULATED.3IONS-SCNC: 15 MMOL/L (ref 5–15)
BH CV ECHO MEAS - ACS: 2.08 CM
BH CV ECHO MEAS - AO MAX PG: 7.8 MMHG
BH CV ECHO MEAS - AO MEAN PG: 4.3 MMHG
BH CV ECHO MEAS - AO ROOT DIAM: 3.2 CM
BH CV ECHO MEAS - AO V2 MAX: 139.3 CM/SEC
BH CV ECHO MEAS - AO V2 VTI: 20.9 CM
BH CV ECHO MEAS - AVA(I,D): 1.38 CM2
BH CV ECHO MEAS - EDV(CUBED): 120.7 ML
BH CV ECHO MEAS - EDV(MOD-SP2): 103 ML
BH CV ECHO MEAS - EDV(MOD-SP4): 72.8 ML
BH CV ECHO MEAS - EF(MOD-BP): 35 %
BH CV ECHO MEAS - EF(MOD-SP2): 31.7 %
BH CV ECHO MEAS - EF(MOD-SP4): 37.5 %
BH CV ECHO MEAS - ESV(CUBED): 59 ML
BH CV ECHO MEAS - ESV(MOD-SP2): 70.4 ML
BH CV ECHO MEAS - ESV(MOD-SP4): 45.5 ML
BH CV ECHO MEAS - FS: 21.2 %
BH CV ECHO MEAS - IVS/LVPW: 1.01 CM
BH CV ECHO MEAS - IVSD: 1.36 CM
BH CV ECHO MEAS - LA DIMENSION: 4.2 CM
BH CV ECHO MEAS - LV DIASTOLIC VOL/BSA (35-75): 38.7 CM2
BH CV ECHO MEAS - LV MASS(C)D: 273.7 GRAMS
BH CV ECHO MEAS - LV MAX PG: 1.52 MMHG
BH CV ECHO MEAS - LV MEAN PG: 1 MMHG
BH CV ECHO MEAS - LV SYSTOLIC VOL/BSA (12-30): 24.2 CM2
BH CV ECHO MEAS - LV V1 MAX: 61.7 CM/SEC
BH CV ECHO MEAS - LV V1 VTI: 10.9 CM
BH CV ECHO MEAS - LVIDD: 4.9 CM
BH CV ECHO MEAS - LVIDS: 3.9 CM
BH CV ECHO MEAS - LVOT AREA: 2.6 CM2
BH CV ECHO MEAS - LVOT DIAM: 1.84 CM
BH CV ECHO MEAS - LVPWD: 1.35 CM
BH CV ECHO MEAS - MR MAX PG: 78.1 MMHG
BH CV ECHO MEAS - MR MAX VEL: 442 CM/SEC
BH CV ECHO MEAS - MV DEC SLOPE: 816 CM/SEC2
BH CV ECHO MEAS - MV MAX PG: 6.5 MMHG
BH CV ECHO MEAS - MV MEAN PG: 4 MMHG
BH CV ECHO MEAS - MV P1/2T: 45.9 MSEC
BH CV ECHO MEAS - MV V2 VTI: 19.1 CM
BH CV ECHO MEAS - MVA(P1/2T): 4.8 CM2
BH CV ECHO MEAS - MVA(VTI): 1.51 CM2
BH CV ECHO MEAS - PA V2 MAX: 70.6 CM/SEC
BH CV ECHO MEAS - RAP SYSTOLE: 8 MMHG
BH CV ECHO MEAS - RVDD: 2.21 CM
BH CV ECHO MEAS - RVSP: 23.8 MMHG
BH CV ECHO MEAS - SI(MOD-SP2): 17.3 ML/M2
BH CV ECHO MEAS - SI(MOD-SP4): 14.5 ML/M2
BH CV ECHO MEAS - SV(LVOT): 28.9 ML
BH CV ECHO MEAS - SV(MOD-SP2): 32.6 ML
BH CV ECHO MEAS - SV(MOD-SP4): 27.3 ML
BH CV ECHO MEAS - TAPSE (>1.6): 1.58 CM
BH CV ECHO MEAS - TR MAX PG: 15.8 MMHG
BH CV ECHO MEAS - TR MAX VEL: 197.6 CM/SEC
BUN SERPL-MCNC: 20 MG/DL (ref 8–23)
BUN/CREAT SERPL: 14.9 (ref 7–25)
CALCIUM SPEC-SCNC: 8.8 MG/DL (ref 8.6–10.5)
CHLORIDE SERPL-SCNC: 102 MMOL/L (ref 98–107)
CO2 SERPL-SCNC: 24 MMOL/L (ref 22–29)
CRE SCREEN PCR: NOT DETECTED
CREAT SERPL-MCNC: 1.34 MG/DL (ref 0.57–1)
DEPRECATED RDW RBC AUTO: 47.5 FL (ref 37–54)
EGFRCR SERPLBLD CKD-EPI 2021: 39.7 ML/MIN/1.73
ERYTHROCYTE [DISTWIDTH] IN BLOOD BY AUTOMATED COUNT: 14.6 % (ref 12.3–15.4)
GLUCOSE BLDC GLUCOMTR-MCNC: 104 MG/DL (ref 70–130)
GLUCOSE BLDC GLUCOMTR-MCNC: 109 MG/DL (ref 70–130)
GLUCOSE BLDC GLUCOMTR-MCNC: 143 MG/DL (ref 70–130)
GLUCOSE SERPL-MCNC: 111 MG/DL (ref 65–99)
HBA1C MFR BLD: 7.9 % (ref 4.8–5.6)
HCT VFR BLD AUTO: 34.9 % (ref 34–46.6)
HGB BLD-MCNC: 11.4 G/DL (ref 12–15.9)
IMP STRAIN: NOT DETECTED
KPC STRAIN: NOT DETECTED
LEFT ATRIUM VOLUME INDEX: 28.7 ML/M2
MAGNESIUM SERPL-MCNC: 2.3 MG/DL (ref 1.6–2.4)
MCH RBC QN AUTO: 29.8 PG (ref 26.6–33)
MCHC RBC AUTO-ENTMCNC: 32.7 G/DL (ref 31.5–35.7)
MCV RBC AUTO: 91.1 FL (ref 79–97)
NDM STRAIN: NOT DETECTED
OXA 48 STRAIN: NOT DETECTED
PLATELET # BLD AUTO: 228 10*3/MM3 (ref 140–450)
PMV BLD AUTO: 8.9 FL (ref 6–12)
POTASSIUM SERPL-SCNC: 3.9 MMOL/L (ref 3.5–5.2)
RBC # BLD AUTO: 3.83 10*6/MM3 (ref 3.77–5.28)
SODIUM SERPL-SCNC: 141 MMOL/L (ref 136–145)
T4 FREE SERPL-MCNC: 1.36 NG/DL (ref 0.93–1.7)
TSH SERPL DL<=0.05 MIU/L-ACNC: 3.5 UIU/ML (ref 0.27–4.2)
VIM STRAIN: NOT DETECTED
WBC NRBC COR # BLD: 8.35 10*3/MM3 (ref 3.4–10.8)

## 2023-08-11 PROCEDURE — 85027 COMPLETE CBC AUTOMATED: CPT | Performed by: INTERNAL MEDICINE

## 2023-08-11 PROCEDURE — 87081 CULTURE SCREEN ONLY: CPT | Performed by: HOSPITALIST

## 2023-08-11 PROCEDURE — 36410 VNPNXR 3YR/> PHY/QHP DX/THER: CPT

## 2023-08-11 PROCEDURE — C1751 CATH, INF, PER/CENT/MIDLINE: HCPCS

## 2023-08-11 PROCEDURE — 25010000002 HYDRALAZINE PER 20 MG: Performed by: INTERNAL MEDICINE

## 2023-08-11 PROCEDURE — G0378 HOSPITAL OBSERVATION PER HR: HCPCS

## 2023-08-11 PROCEDURE — 83036 HEMOGLOBIN GLYCOSYLATED A1C: CPT | Performed by: INTERNAL MEDICINE

## 2023-08-11 PROCEDURE — 84439 ASSAY OF FREE THYROXINE: CPT | Performed by: INTERNAL MEDICINE

## 2023-08-11 PROCEDURE — 80048 BASIC METABOLIC PNL TOTAL CA: CPT | Performed by: INTERNAL MEDICINE

## 2023-08-11 PROCEDURE — 25010000002 HEPARIN (PORCINE) PER 1000 UNITS: Performed by: INTERNAL MEDICINE

## 2023-08-11 PROCEDURE — 84443 ASSAY THYROID STIM HORMONE: CPT | Performed by: INTERNAL MEDICINE

## 2023-08-11 PROCEDURE — 93306 TTE W/DOPPLER COMPLETE: CPT | Performed by: INTERNAL MEDICINE

## 2023-08-11 PROCEDURE — 76937 US GUIDE VASCULAR ACCESS: CPT

## 2023-08-11 PROCEDURE — 83735 ASSAY OF MAGNESIUM: CPT | Performed by: INTERNAL MEDICINE

## 2023-08-11 PROCEDURE — 87040 BLOOD CULTURE FOR BACTERIA: CPT | Performed by: HOSPITALIST

## 2023-08-11 PROCEDURE — 93306 TTE W/DOPPLER COMPLETE: CPT

## 2023-08-11 PROCEDURE — 25010000002 CEFTRIAXONE PER 250 MG: Performed by: INTERNAL MEDICINE

## 2023-08-11 PROCEDURE — 25010000002 LABETALOL 5 MG/ML SOLUTION: Performed by: INTERNAL MEDICINE

## 2023-08-11 PROCEDURE — 82948 REAGENT STRIP/BLOOD GLUCOSE: CPT

## 2023-08-11 RX ORDER — SODIUM CHLORIDE 0.9 % (FLUSH) 0.9 %
10 SYRINGE (ML) INJECTION EVERY 12 HOURS SCHEDULED
Status: DISCONTINUED | OUTPATIENT
Start: 2023-08-11 | End: 2023-08-15

## 2023-08-11 RX ORDER — HEPARIN SODIUM 5000 [USP'U]/ML
5000 INJECTION, SOLUTION INTRAVENOUS; SUBCUTANEOUS EVERY 12 HOURS SCHEDULED
Status: DISCONTINUED | OUTPATIENT
Start: 2023-08-11 | End: 2023-08-16 | Stop reason: HOSPADM

## 2023-08-11 RX ORDER — SODIUM CHLORIDE 9 MG/ML
40 INJECTION, SOLUTION INTRAVENOUS AS NEEDED
Status: DISCONTINUED | OUTPATIENT
Start: 2023-08-11 | End: 2023-08-15

## 2023-08-11 RX ORDER — NITROGLYCERIN 0.4 MG/1
0.4 TABLET SUBLINGUAL
Status: DISCONTINUED | OUTPATIENT
Start: 2023-08-11 | End: 2023-08-16 | Stop reason: HOSPADM

## 2023-08-11 RX ORDER — BISACODYL 10 MG
10 SUPPOSITORY, RECTAL RECTAL DAILY PRN
Status: DISCONTINUED | OUTPATIENT
Start: 2023-08-11 | End: 2023-08-16 | Stop reason: HOSPADM

## 2023-08-11 RX ORDER — POLYETHYLENE GLYCOL 3350 17 G/17G
17 POWDER, FOR SOLUTION ORAL DAILY PRN
Status: DISCONTINUED | OUTPATIENT
Start: 2023-08-11 | End: 2023-08-16 | Stop reason: HOSPADM

## 2023-08-11 RX ORDER — ASPIRIN 81 MG/1
81 TABLET ORAL DAILY
Status: DISCONTINUED | OUTPATIENT
Start: 2023-08-11 | End: 2023-08-16 | Stop reason: HOSPADM

## 2023-08-11 RX ORDER — ROSUVASTATIN CALCIUM 10 MG/1
20 TABLET, COATED ORAL NIGHTLY
Status: DISCONTINUED | OUTPATIENT
Start: 2023-08-11 | End: 2023-08-16 | Stop reason: HOSPADM

## 2023-08-11 RX ORDER — HYDRALAZINE HYDROCHLORIDE 20 MG/ML
10 INJECTION INTRAMUSCULAR; INTRAVENOUS EVERY 6 HOURS PRN
Status: DISCONTINUED | OUTPATIENT
Start: 2023-08-11 | End: 2023-08-16 | Stop reason: HOSPADM

## 2023-08-11 RX ORDER — SODIUM CHLORIDE 0.9 % (FLUSH) 0.9 %
10 SYRINGE (ML) INJECTION AS NEEDED
Status: DISCONTINUED | OUTPATIENT
Start: 2023-08-11 | End: 2023-08-15

## 2023-08-11 RX ORDER — SODIUM CHLORIDE, SODIUM LACTATE, POTASSIUM CHLORIDE, CALCIUM CHLORIDE 600; 310; 30; 20 MG/100ML; MG/100ML; MG/100ML; MG/100ML
60 INJECTION, SOLUTION INTRAVENOUS CONTINUOUS
Status: DISCONTINUED | OUTPATIENT
Start: 2023-08-11 | End: 2023-08-11

## 2023-08-11 RX ORDER — LISINOPRIL 40 MG/1
40 TABLET ORAL DAILY
Status: DISCONTINUED | OUTPATIENT
Start: 2023-08-11 | End: 2023-08-16 | Stop reason: HOSPADM

## 2023-08-11 RX ORDER — AMOXICILLIN 250 MG
2 CAPSULE ORAL 2 TIMES DAILY
Status: DISCONTINUED | OUTPATIENT
Start: 2023-08-11 | End: 2023-08-16 | Stop reason: HOSPADM

## 2023-08-11 RX ORDER — LABETALOL HYDROCHLORIDE 5 MG/ML
10 INJECTION, SOLUTION INTRAVENOUS EVERY 4 HOURS PRN
Status: DISCONTINUED | OUTPATIENT
Start: 2023-08-11 | End: 2023-08-11

## 2023-08-11 RX ORDER — ALBUTEROL SULFATE 2.5 MG/3ML
2.5 SOLUTION RESPIRATORY (INHALATION) EVERY 6 HOURS PRN
COMMUNITY

## 2023-08-11 RX ORDER — LABETALOL HYDROCHLORIDE 5 MG/ML
10 INJECTION, SOLUTION INTRAVENOUS
Status: DISCONTINUED | OUTPATIENT
Start: 2023-08-11 | End: 2023-08-16 | Stop reason: HOSPADM

## 2023-08-11 RX ORDER — BISACODYL 5 MG/1
5 TABLET, DELAYED RELEASE ORAL DAILY PRN
Status: DISCONTINUED | OUTPATIENT
Start: 2023-08-11 | End: 2023-08-16 | Stop reason: HOSPADM

## 2023-08-11 RX ORDER — CARVEDILOL 12.5 MG/1
12.5 TABLET ORAL 2 TIMES DAILY WITH MEALS
Status: DISCONTINUED | OUTPATIENT
Start: 2023-08-11 | End: 2023-08-16 | Stop reason: HOSPADM

## 2023-08-11 RX ADMIN — DOCUSATE SODIUM 50 MG AND SENNOSIDES 8.6 MG 2 TABLET: 8.6; 5 TABLET, FILM COATED ORAL at 10:18

## 2023-08-11 RX ADMIN — HEPARIN SODIUM 5000 UNITS: 5000 INJECTION INTRAVENOUS; SUBCUTANEOUS at 21:26

## 2023-08-11 RX ADMIN — LISINOPRIL 40 MG: 40 TABLET ORAL at 10:12

## 2023-08-11 RX ADMIN — CARVEDILOL 12.5 MG: 12.5 TABLET, FILM COATED ORAL at 10:12

## 2023-08-11 RX ADMIN — CARVEDILOL 12.5 MG: 12.5 TABLET, FILM COATED ORAL at 17:01

## 2023-08-11 RX ADMIN — CEFTRIAXONE SODIUM 2000 MG: 2 INJECTION, POWDER, FOR SOLUTION INTRAMUSCULAR; INTRAVENOUS at 13:38

## 2023-08-11 RX ADMIN — HYDRALAZINE HYDROCHLORIDE 10 MG: 20 INJECTION INTRAMUSCULAR; INTRAVENOUS at 03:42

## 2023-08-11 RX ADMIN — Medication 10 ML: at 21:27

## 2023-08-11 RX ADMIN — ASPIRIN 81 MG: 81 TABLET, COATED ORAL at 10:11

## 2023-08-11 RX ADMIN — ROSUVASTATIN CALCIUM 20 MG: 10 TABLET, FILM COATED ORAL at 21:31

## 2023-08-11 RX ADMIN — HEPARIN SODIUM 5000 UNITS: 5000 INJECTION INTRAVENOUS; SUBCUTANEOUS at 10:11

## 2023-08-11 RX ADMIN — LABETALOL HYDROCHLORIDE 10 MG: 5 INJECTION, SOLUTION INTRAVENOUS at 02:24

## 2023-08-11 RX ADMIN — TICAGRELOR 90 MG: 90 TABLET ORAL at 21:26

## 2023-08-11 RX ADMIN — TICAGRELOR 90 MG: 90 TABLET ORAL at 02:23

## 2023-08-11 RX ADMIN — SODIUM CHLORIDE 5 MG/HR: 900 INJECTION, SOLUTION INTRAVENOUS at 20:36

## 2023-08-11 NOTE — ED NOTES
Nursing report ED to floor  Magy Barton  82 y.o.  female    HPI:   Chief Complaint   Patient presents with    Abdominal Pain       Admitting doctor:   Saeid Behroozi, MD    Consulting provider(s):  Consults       Date and Time Order Name Status Description    8/10/2023 10:03 PM Hospitalist (on-call MD unless specified)               Admitting diagnosis:   The primary encounter diagnosis was Atrial fibrillation with rapid ventricular response. Diagnoses of Acquired renal cyst of right kidney and UPJ obstruction, acquired were also pertinent to this visit.    Code status:   Current Code Status       Date Active Code Status Order ID Comments User Context       Prior            Allergies:   Sulfa antibiotics, Atorvastatin, Levofloxacin, Meclizine, and Morphine    Intake and Output  No intake or output data in the 24 hours ending 08/10/23 2333    Weight:       08/10/23  1918   Weight: 86.2 kg (190 lb)       Most recent vitals:   Vitals:    08/10/23 2150 08/10/23 2233 08/10/23 2324 08/10/23 2331   BP:  164/91 (!) 173/101    BP Location:  Left arm     Patient Position:  Lying     Pulse: (!) 132 111 112 100   Resp:  18     Temp:       TempSrc:       SpO2: 90% 96% 97% 95%   Weight:       Height:         Oxygen Therapy: 02 2 liters     Active LDAs/IV Access:   Lines, Drains & Airways       Active LDAs       Name Placement date Placement time Site Days    Peripheral IV 08/10/23 2016 Right  08/10/23  2016  --  chest  less than 1                    Labs (abnormal labs have a star):   Labs Reviewed   COMPREHENSIVE METABOLIC PANEL - Abnormal; Notable for the following components:       Result Value    Glucose 109 (*)     Creatinine 1.43 (*)     Albumin 3.4 (*)     Alkaline Phosphatase 168 (*)     eGFR 36.7 (*)     All other components within normal limits    Narrative:     GFR Normal >60  Chronic Kidney Disease <60  Kidney Failure <15    The GFR formula is only valid for adults with stable renal function between ages 18 and 70.    URINALYSIS W/ MICROSCOPIC IF INDICATED (NO CULTURE) - Abnormal; Notable for the following components:    Appearance, UA Cloudy (*)     Ketones, UA 15 mg/dL (1+) (*)     Protein, UA Trace (*)     Leuk Esterase, UA Trace (*)     All other components within normal limits   CBC WITH AUTO DIFFERENTIAL - Abnormal; Notable for the following components:    WBC 10.98 (*)     Hemoglobin 11.8 (*)     Neutrophil % 77.3 (*)     Lymphocyte % 12.8 (*)     Neutrophils, Absolute 8.49 (*)     All other components within normal limits   URINALYSIS, MICROSCOPIC ONLY - Abnormal; Notable for the following components:    RBC, UA 0-2 (*)     Bacteria, UA 1+ (*)     Squamous Epithelial Cells, UA 3-5 (*)     All other components within normal limits   TROPONIN - Abnormal; Notable for the following components:    HS Troponin T 71 (*)     All other components within normal limits    Narrative:     High Sensitive Troponin T Reference Range:  <10.0 ng/L- Negative Female for AMI  <15.0 ng/L- Negative Male for AMI  >=10 - Abnormal Female indicating possible myocardial injury.  >=15 - Abnormal Male indicating possible myocardial injury.   Clinicians would have to utilize clinical acumen, EKG, Troponin, and serial changes to determine if it is an Acute Myocardial Infarction or myocardial injury due to an underlying chronic condition.        BNP (IN-HOUSE) - Abnormal; Notable for the following components:    proBNP 9,468.0 (*)     All other components within normal limits    Narrative:     Among patients with dyspnea, NT-proBNP is highly sensitive for the detection of acute congestive heart failure. In addition NT-proBNP of <300 pg/ml effectively rules out acute congestive heart failure with 99% negative predictive value.     HIGH SENSITIVITIY TROPONIN T 2HR - Abnormal; Notable for the following components:    HS Troponin T 79 (*)     Troponin T Delta 8 (*)     All other components within normal limits    Narrative:     High Sensitive Troponin T  Reference Range:  <10.0 ng/L- Negative Female for AMI  <15.0 ng/L- Negative Male for AMI  >=10 - Abnormal Female indicating possible myocardial injury.  >=15 - Abnormal Male indicating possible myocardial injury.   Clinicians would have to utilize clinical acumen, EKG, Troponin, and serial changes to determine if it is an Acute Myocardial Infarction or myocardial injury due to an underlying chronic condition.        LIPASE - Normal   LACTIC ACID, PLASMA - Normal   RAINBOW DRAW    Narrative:     The following orders were created for panel order [place] Draw.  Procedure                               Abnormality         Status                     ---------                               -----------         ------                     Green Top (Gel)[[id number]]                                  Final result               Lavender Top[[id number]]                                     Final result               Gold Top - SST[[id number]]                                   Final result               Light Blue Top[[id number]]                                   Final result                 Please view results for these tests on the individual orders.   CBC AND DIFFERENTIAL    Narrative:     The following orders were created for panel order CBC & Differential.  Procedure                               Abnormality         Status                     ---------                               -----------         ------                     CBC Auto Differential[[id number]]        Abnormal            Final result                 Please view results for these tests on the individual orders.   GREEN TOP   LAVENDER TOP   GOLD TOP - SST   LIGHT BLUE TOP       Meds given in ED:   Medications   sodium chloride 0.9 % flush 10 mL (has no administration in time range)   dilTIAZem (CARDIZEM) 100 mg in 100 mL NS infusion (ADV) (5 mg/hr Intravenous New Bag 8/10/23 2325)   dilTIAZem (CARDIZEM) injection 10 mg (10 mg Intravenous Given 8/10/23 2323)      dilTIAZem, 5-15 mg/hr, Last Rate: 5 mg/hr (08/10/23 1942)         NIH Stroke Scale:       Isolation/Infection(s):  No active isolations   No active infections     COVID Testing  Collectedna   Resultedna     Nursing report ED to floor:  Mental status:alert confused   Ambulatory status:up with assist   Precautions: fall     ED nurse phone extentsion- [phone]

## 2023-08-11 NOTE — PLAN OF CARE
Goal Outcome Evaluation:                     Remains in A fib and on cardizem gtt. Screen positive for simple sepsis today - Dr. Escobar made aware and blood cultures drawn. Midline placed today. Urine output has been adequate. Afebrile.

## 2023-08-11 NOTE — CONSULTS
Referring Provider: Hospitalist  Reason for Consultation: Kidney stone    Patient Care Team:  Sean Briones MD as PCP - General (Family Medicine)    Chief complaint: Flank pain    Subjective .     History of present illness: Patient comes in with flank pain had CT scan showing kidney stone but in the process had arrhythmia and subsequent was placed in the ICU.  This CT shows a component of hydronephrosis on the right from UPJ obstruction in addition has a cystic type mass in the region of the right renal pelvis    Review of Systems:  Pertinent items are noted in HPI    History:  Past Medical History:   Diagnosis Date    Cancer 2009    leukemia    Coronary artery disease     Diabetes mellitus     Hypertension     Injury of back     Myocardial infarction     Stroke      Past Surgical History:   Procedure Laterality Date    CARDIAC CATHETERIZATION Left 9/18/2022    Procedure: Left Heart Cath;  Surgeon: Ayaz Wild DO;  Location: [facility] CATH INVASIVE LOCATION;  Service: Cardiology;  Laterality: Left;    CARDIAC CATHETERIZATION N/A 9/19/2022    Procedure: PERCUTANEOUS CORONARY INTERVENTION;  Surgeon: Ayaz Wild DO;  Location: [facility] CATH INVASIVE LOCATION;  Service: Cardiology;  Laterality: N/A;    CATARACT EXTRACTION      TRIGEMINAL NERVE DECOMPRESSION      TUBAL ABDOMINAL LIGATION      UVULOPALATOPHARYNGOPLASTY       History reviewed. No pertinent family history.  Social History     Tobacco Use    Smoking status: Never    Smokeless tobacco: Never   Vaping Use    Vaping Use: Never used   Substance Use Topics    Alcohol use: No    Drug use: No     Medications Prior to Admission   Medication Sig Dispense Refill Last Dose    albuterol (PROVENTIL) (2.5 MG/3ML) 0.083% nebulizer solution Take 2.5 mg by nebulization Every 6 (Six) Hours As Needed for Wheezing.   8/10/2023    amLODIPine (NORVASC) 10 MG tablet Take 1 tablet by mouth Every Night.   8/10/2023    Aspirin (ASPIR-81 PO) Take 81 mg by mouth Daily.    8/10/2023    carBAMazepine (TEGretol) 200 MG tablet Take 1 tablet by mouth 2 (Two) Times a Day.   8/10/2023    carvedilol (COREG) 12.5 MG tablet Take 1 tablet by mouth 2 (Two) Times a Day With Meals. 60 tablet 5 8/10/2023    DOCUSATE SODIUM PO Take 200 mg by mouth 2 (Two) Times a Day.   8/10/2023    Insulin Glargine (BASAGLAR KWIKPEN) 100 UNIT/ML injection pen Inject 28 Units under the skin into the appropriate area as directed Every Night.   8/9/2023    lisinopril (PRINIVIL,ZESTRIL) 40 MG tablet Take 1 tablet by mouth Daily.   8/10/2023    melatonin 3 MG tablet Take 1 tablet by mouth Every Night. For insomnia   Past Week    Mirabegron ER (MYRBETRIQ) 50 MG tablet sustained-release 24 hour 24 hr tablet Take 50 mg by mouth Daily.   8/10/2023    polyethylene glycol (MIRALAX) 17 GM/SCOOP powder Take 17 g by mouth Daily As Needed.   Past Week    ticagrelor (BRILINTA) 90 MG tablet tablet Take 1 tablet by mouth 2 (Two) Times a Day. 60 tablet 11 8/10/2023    venlafaxine (EFFEXOR) 37.5 MG tablet Take 1 tablet by mouth 2 (Two) Times a Day.   8/10/2023    acetaminophen (TYLENOL) 325 MG tablet Take 2 tablets by mouth Every 6 (Six) Hours As Needed for Mild Pain.   Unknown    Glucagon (GLUCAGEN) 1 MG injection Inject 1 mg under the skin into the appropriate area as directed 1 (One) Time As Needed for Low Blood Sugar.   More than a month    nitroglycerin (NITROSTAT) 0.4 MG SL tablet    Unknown    rosuvastatin (CRESTOR) 20 MG tablet Take 1 tablet by mouth Every Night. (Patient taking differently: Take 2 tablets by mouth Every Night.) 90 tablet 5 8/9/2023        Allergies: Sulfa antibiotics, Atorvastatin, Levofloxacin, Meclizine, and Morphine    Objective     Vital Signs:   Temp:  [97.7 øF (36.5 øC)] 97.7 øF (36.5 øC)  Heart Rate:  [] 109  Resp:  [16-20] 16  BP: (135-194)/() 146/73    Physical Exam:     General Appearance:    Alert, cooperative, in no acute distress.   Head:    Normocephalic, without obvious abnormality,  atraumatic.   Eyes:            Lids and lashes normal, conjunctivae and sclerae normal, no   icterus, no pallor, corneas clear, PERRLA.   Ears:    Ears appear intact with no abnormalities noted.   Throat:   No oral lesions, no thrush, oral mucosa moist.   Lungs:     Clear to auscultation, respirations regular, even and                  unlabored.    Heart:    Regular rhythm and normal rate, normal S1 and S2, no            murmur, no gallop, no rub, no click.   Abdomen:     Normal bowel sounds, no masses, no organomegaly, soft        nontender, nondistended, no guarding, no rebound          tenderness.   Genitourinary: Urine clear.   Rectal:   Deferred.   Extremities:   Moves all extremities well, no edema, no cyanosis, no             redness.   Pulses:   Pulses palpable and equal bilaterally.   Skin:   No bleeding, bruising or rash.   Neurologic:   Cranial nerves 2 - 12 grossly intact, sensation intact, DTR       present and equal bilaterally.       Results Review:    Lab Results (last 24 hours)       Procedure Component Value Units Date/Time    Urine Culture - Urine, Urine, Clean Catch [[id number]] Collected: 08/10/23 2040    Specimen: Urine, Clean Catch Updated: 08/11/23 0728    POC Glucose Once [[id number]]  (Normal) Collected: 08/11/23 0624    Specimen: Blood Updated: 08/11/23 0640     Glucose 109 mg/dL      Comment: : [id number] BIBI AIMEEMeter ID: [id number]       High Sensitivity Troponin T 2Hr [[id number]]  (Abnormal) Collected: 08/10/23 2249    Specimen: Blood Updated: 08/10/23 2313     HS Troponin T 79 ng/L      Troponin T Delta 8 ng/L     Narrative:      High Sensitive Troponin T Reference Range:  <10.0 ng/L- Negative Female for AMI  <15.0 ng/L- Negative Male for AMI  >=10 - Abnormal Female indicating possible myocardial injury.  >=15 - Abnormal Male indicating possible myocardial injury.   Clinicians would have to utilize clinical acumen, EKG, Troponin, and serial changes to determine if it is an  Acute Myocardial Infarction or myocardial injury due to an underlying chronic condition.         High Sensitivity Troponin T [[id number]]  (Abnormal) Collected: 08/10/23 1722    Specimen: Blood Updated: 08/10/23 2222     HS Troponin T 71 ng/L     Narrative:      High Sensitive Troponin T Reference Range:  <10.0 ng/L- Negative Female for AMI  <15.0 ng/L- Negative Male for AMI  >=10 - Abnormal Female indicating possible myocardial injury.  >=15 - Abnormal Male indicating possible myocardial injury.   Clinicians would have to utilize clinical acumen, EKG, Troponin, and serial changes to determine if it is an Acute Myocardial Infarction or myocardial injury due to an underlying chronic condition.         BNP [[id number]]  (Abnormal) Collected: 08/10/23 1722    Specimen: Blood Updated: 08/10/23 2220     proBNP 9,468.0 pg/mL     Narrative:      Among patients with dyspnea, NT-proBNP is highly sensitive for the detection of acute congestive heart failure. In addition NT-proBNP of <300 pg/ml effectively rules out acute congestive heart failure with 99% negative predictive value.      Urinalysis With Microscopic If Indicated (No Culture) - Urine, Clean Catch [[id number]]  (Abnormal) Collected: 08/10/23 2040    Specimen: Urine, Clean Catch Updated: 08/10/23 2048     Color, UA Yellow     Appearance, UA Cloudy     pH, UA 7.0     Specific Gravity, UA 1.009     Glucose, UA Negative     Ketones, UA 15 mg/dL (1+)     Bilirubin, UA Negative     Blood, UA Negative     Protein, UA Trace     Leuk Esterase, UA Trace     Nitrite, UA Negative     Urobilinogen, UA 0.2 E.U./dL    Urinalysis, Microscopic Only - Urine, Clean Catch [[id number]]  (Abnormal) Collected: 08/10/23 2040    Specimen: Urine, Clean Catch Updated: 08/10/23 2048     RBC, UA 0-2 /HPF      WBC, UA 0-2 /HPF      Bacteria, UA 1+ /HPF      Squamous Epithelial Cells, UA 3-5 /HPF      Hyaline Casts, UA None Seen /LPF      Methodology Automated Microscopy    [place] Draw [[id number]]  Collected: 08/10/23 1722    Specimen: Blood Updated: 08/10/23 1832    Narrative:      The following orders were created for panel order [place] Draw.  Procedure                               Abnormality         Status                     ---------                               -----------         ------                     Green Top (Gel)[[id number]]                                  Final result               Lavender Top[[id number]]                                     Final result               Gold Top - SST[[id number]]                                   Final result               Light Blue Top[[id number]]                                   Final result                 Please view results for these tests on the individual orders.    Green Top (Gel) [[id number]] Collected: 08/10/23 1722    Specimen: Blood Updated: 08/10/23 1832     Extra Tube Hold for add-ons.     Comment: Auto resulted.       Lavender Top [[id number]] Collected: 08/10/23 1722    Specimen: Blood Updated: 08/10/23 1832     Extra Tube hold for add-on     Comment: Auto resulted       Gold Top - SST [[id number]] Collected: 08/10/23 1722    Specimen: Blood Updated: 08/10/23 1832     Extra Tube Hold for add-ons.     Comment: Auto resulted.       Light Blue Top [[id number]] Collected: 08/10/23 1722    Specimen: Blood Updated: 08/10/23 1832     Extra Tube Hold for add-ons.     Comment: Auto resulted       Comprehensive Metabolic Panel [[id number]]  (Abnormal) Collected: 08/10/23 1722    Specimen: Blood Updated: 08/10/23 1827     Glucose 109 mg/dL      BUN 20 mg/dL      Creatinine 1.43 mg/dL      Sodium 139 mmol/L      Potassium 4.5 mmol/L      Comment: Slight hemolysis detected by analyzer. Results may be affected.        Chloride 102 mmol/L      CO2 23.0 mmol/L      Calcium 8.7 mg/dL      Total Protein 8.3 g/dL      Albumin 3.4 g/dL      ALT (SGPT) 13 U/L      AST (SGOT) 25 U/L      Comment: Slight hemolysis detected by analyzer. Results may be affected.         Alkaline Phosphatase 168 U/L      Total Bilirubin 0.2 mg/dL      Globulin 4.9 gm/dL      A/G Ratio 0.7 g/dL      BUN/Creatinine Ratio 14.0     Anion Gap 14.0 mmol/L      eGFR 36.7 mL/min/1.73     Narrative:      GFR Normal >60  Chronic Kidney Disease <60  Kidney Failure <15    The GFR formula is only valid for adults with stable renal function between ages 18 and 70.    Lipase [[id number]]  (Normal) Collected: 08/10/23 1722    Specimen: Blood Updated: 08/10/23 1827     Lipase 19 U/L     Lactic Acid, Plasma [[id number]]  (Normal) Collected: 08/10/23 1722    Specimen: Blood Updated: 08/10/23 1821     Lactate 1.2 mmol/L     CBC & Differential [[id number]]  (Abnormal) Collected: 08/10/23 1722    Specimen: Blood Updated: 08/10/23 1806    Narrative:      The following orders were created for panel order CBC & Differential.  Procedure                               Abnormality         Status                     ---------                               -----------         ------                     CBC Auto Differential[[id number]]        Abnormal            Final result                 Please view results for these tests on the individual orders.    CBC Auto Differential [[id number]]  (Abnormal) Collected: 08/10/23 1722    Specimen: Blood Updated: 08/10/23 1806     WBC 10.98 10*3/mm3      RBC 3.95 10*6/mm3      Hemoglobin 11.8 g/dL      Hematocrit 35.9 %      MCV 90.9 fL      MCH 29.9 pg      MCHC 32.9 g/dL      RDW 14.2 %      RDW-SD 46.6 fl      MPV 9.2 fL      Platelets 244 10*3/mm3      Neutrophil % 77.3 %      Lymphocyte % 12.8 %      Monocyte % 7.3 %      Eosinophil % 1.0 %      Basophil % 1.2 %      Immature Grans % 0.4 %      Neutrophils, Absolute 8.49 10*3/mm3      Lymphocytes, Absolute 1.41 10*3/mm3      Monocytes, Absolute 0.80 10*3/mm3      Eosinophils, Absolute 0.11 10*3/mm3      Basophils, Absolute 0.13 10*3/mm3      Immature Grans, Absolute 0.04 10*3/mm3      nRBC 0.0 /100 WBC            Imaging Results (Last 24  Hours)       Procedure Component Value Units Date/Time    CT Abdomen Pelvis Without Contrast [[id number]] Collected: 08/10/23 2049     Updated: 08/10/23 2116    Narrative:      EXAM:  CT abdomen and pelvis without contrast.    COMPARISON:  CT abdomen and pelvis without contrast, January 9, 2023    HISTORY:  Flank pain, unspecified side.  Kidney stone suspected.    TECHNIQUE:  Axial images from the level of the diaphragms through the pelvis were performed  followed by 2D multiplanar reformats.    FINDINGS:  Small bilateral pleural effusions with mild bibasilar atelectasis.  Heart size  is grossly within normal limits.    Liver, spleen, pancreas, adrenal glands and left kidney are unremarkable for a  noncontrast exam.  Redemonstration of right-sided hydronephrosis which has the  appearance of UPJ obstruction.  This is grossly stable.  However, this could  also be due to a large renal cyst or parapelvic cysts.    No lymphadenopathy identified in the abdomen or pelvis by size criteria.    Bladder is unremarkable.  Uterus is small and atrophic which is not unexpected  for age.  There is colonic diverticulitis without evidence of acute  diverticulitis.  The appendix is not definitely seen.  No free fluid.    No acute osseous abnormality.      Impression:      1.  There is redemonstration of a large cystic structure in the region of the  right renal pelvis.  It is uncertain if this represents a large renal cyst or  peripelvic renal cysts or if this may represent a dilated renal pelvis in the  setting of UPJ obstruction.  There does appear to be a component of  hydronephrosis on today's exam with dilatation of renal calyces.  This was not  seen on the prior exam.  As a result, this represents UPJ obstruction, this is  presumably worsening.  Alternatively, this could represent a peripelvic cyst  with interval development of right-sided hydronephrosis due to ureteral  obstruction, stricture or lesion.  There is no discrete  ureteral stone  identified.    2.  Small bilateral pleural effusions.    Additional findings as described above.            I reviewed the patient's new clinical results.  I reviewed the patient's new imaging results and agree with the interpretation.  I reviewed the patient's other test results and agree with the interpretation.      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     Brenden Clark MD  08/11/23  07:42 CDT

## 2023-08-11 NOTE — H&P
[facility] Medicine Admission      Date of Admission: 8/10/2023      Primary Care Physician: Sean Briones MD      Chief Complaint: I do not know    HPI:    Patient is a 82-year-old obese female with known past medical history of hospitalization in September 2022 for STEMI with intervention to LAD and RCA, as history of diabetes mellitus type 2 chronic kidney disease anemia hypertension CVA, resident of nursing facility was sent to ED per ED record concerning possible kidney stone not further specified.  Patient denied any particular complaint in ED.  She had a abnormal CT of the abdomen and pelvis with large cyst.  Patient was found to be in A-fib with RVR.  Hospitalist service was called for observation of the patient.  I discussed with ED attending Dr. Golden.    I have seen and examined patient in ED room 11.  Patient resting in bed on room air denies any particular complaint.  On questioning why she is in the ER she states I do not know.  On questioning patient denies any fall injury trauma fever chills headache chest pain shortness of breath back pain abdominal pain URI UTI-like symptoms, constipation diarrhea nausea vomiting, bleeding in particular.    Concurrent Medical History:  has a past medical history of Cancer (2009), Coronary artery disease, Diabetes mellitus, Hypertension, Injury of back, Myocardial infarction, and Stroke.    Past Surgical History:  has a past surgical history that includes Uvulopalatopharyngoplasty; Tubal ligation; Cataract extraction; Trigeminal nerve decompression; Cardiac catheterization (Left, 9/18/2022); and Cardiac catheterization (N/A, 9/19/2022).    Family History: family history is not on file.     Social History:  reports that she has never smoked. She has never used smokeless tobacco. She reports that she does not drink alcohol and does not use drugs.    Allergies:   Allergies   Allergen Reactions    Sulfa Antibiotics Itching  and Rash     Patient states she had the Worse yeast infection ever.  Reaction: rash  Patient states she had the Worse yeast infection ever.      Atorvastatin      Memory    Levofloxacin     Meclizine     Morphine        Medications:   Prior to Admission medications    Medication Sig Start Date End Date Taking? Authorizing Provider   acetaminophen (TYLENOL) 325 MG tablet Take 2 tablets by mouth Every 6 (Six) Hours As Needed for Mild Pain.    Marsha Norman MD   amLODIPine (NORVASC) 10 MG tablet Take 1 tablet by mouth Every Night. 2/15/17   Marsha Norman MD   Aspirin (ASPIR-81 PO) Take 81 mg by mouth Daily.    Marsha Norman MD   carBAMazepine (TEGretol) 200 MG tablet Take 1 tablet by mouth 2 (Two) Times a Day. 7/25/16   Marsha Norman MD   carvedilol (COREG) 12.5 MG tablet Take 1 tablet by mouth 2 (Two) Times a Day With Meals. 9/20/22   Ayaz Wild DO   DOCUSATE SODIUM PO Take 200 mg by mouth 2 (Two) Times a Day.    Marsha Norman MD   Glucagon (GLUCAGEN) 1 MG injection Inject 1 mg under the skin into the appropriate area as directed 1 (One) Time As Needed for Low Blood Sugar.    Marsha Norman MD   Insulin Glargine (BASAGLAR KWIKPEN) 100 UNIT/ML injection pen Inject 28 Units under the skin into the appropriate area as directed.    Marsha Norman MD   lisinopril (PRINIVIL,ZESTRIL) 40 MG tablet Take 1 tablet by mouth Daily. 2/15/17   Marsha Norman MD   melatonin 3 MG tablet Take 1 tablet by mouth Every Night. For insomnia    Marsha Norman MD   Mirabegron ER (MYRBETRIQ) 50 MG tablet sustained-release 24 hour 24 hr tablet Take 50 mg by mouth Daily.    Marsha Norman MD   nitroglycerin (NITROSTAT) 0.4 MG SL tablet  1/10/23   Marsha Norman MD   polyethylene glycol (MiraLax) 17 GM/SCOOP powder Take 17 g by mouth Daily As Needed.    Marsha Norman MD   rosuvastatin (CRESTOR) 20 MG tablet Take 1 tablet by mouth Every Night. 9/20/22    Ayaz Wild,    ticagrelor (BRILINTA) 90 MG tablet tablet Take 1 tablet by mouth 2 (Two) Times a Day. 9/20/22   Ayaz Wild DO   venlafaxine (EFFEXOR) 37.5 MG tablet Take 1 tablet by mouth 2 (Two) Times a Day.    Provider, Marsha, MD       Review of Systems:  Review of Systems   Constitutional:  Negative for chills, diaphoresis, fatigue and fever.   HENT:  Negative for congestion, dental problem, ear pain, facial swelling, rhinorrhea and sinus pressure.    Eyes:  Negative for photophobia, discharge, redness, itching and visual disturbance.   Respiratory:  Negative for apnea, cough, choking, chest tightness, shortness of breath, wheezing and stridor.    Cardiovascular:  Negative for chest pain, palpitations and leg swelling.   Gastrointestinal:  Negative for abdominal distention, abdominal pain, anal bleeding, blood in stool, diarrhea, nausea, rectal pain and vomiting.   Endocrine: Negative for cold intolerance, heat intolerance, polydipsia, polyphagia and polyuria.   Genitourinary:  Negative for difficulty urinating, flank pain, frequency, hematuria and urgency.   Musculoskeletal:  Negative for arthralgias, back pain, joint swelling and myalgias.   Skin:  Negative for pallor, rash and wound.   Allergic/Immunologic: Negative for environmental allergies and immunocompromised state.   Neurological:  Negative for dizziness, tremors, seizures, facial asymmetry, speech difficulty, weakness, light-headedness, numbness and headaches.   Hematological:  Negative for adenopathy. Does not bruise/bleed easily.   Psychiatric/Behavioral:  Negative for agitation, behavioral problems and hallucinations. The patient is not nervous/anxious.     Otherwise complete ROS is negative except as mentioned above.    Physical Exam:   Temp:  [97.7 øF (36.5 øC)] 97.7 øF (36.5 øC)  Heart Rate:  [112-118] 112  Resp:  [16-20] 20  BP: (147-176)/() 173/99  Physical Exam  Constitutional:       General: She is not in acute distress.      Appearance: She is obese. She is not ill-appearing, toxic-appearing or diaphoretic.   HENT:      Head: Normocephalic and atraumatic.      Right Ear: External ear normal.      Left Ear: External ear normal.      Nose: Nose normal.      Mouth/Throat:      Mouth: Mucous membranes are moist.      Pharynx: Oropharynx is clear.   Eyes:      Extraocular Movements: Extraocular movements intact.      Conjunctiva/sclera: Conjunctivae normal.      Pupils: Pupils are equal, round, and reactive to light.   Cardiovascular:      Rate and Rhythm: Regular rhythm. Tachycardia present.      Heart sounds: No murmur heard.    No friction rub. No gallop.   Pulmonary:      Effort: No respiratory distress.      Breath sounds: No stridor. No wheezing or rales.   Chest:      Chest wall: No tenderness.   Abdominal:      General: Abdomen is flat. There is no distension.      Palpations: Abdomen is soft.      Tenderness: There is no abdominal tenderness. There is no guarding or rebound.   Musculoskeletal:         General: No swelling or tenderness.      Cervical back: No rigidity or tenderness.      Right lower leg: No edema.      Left lower leg: No edema.   Lymphadenopathy:      Cervical: No cervical adenopathy.   Skin:     General: Skin is warm and dry.      Coloration: Skin is not jaundiced.      Findings: No erythema.   Neurological:      Mental Status: She is alert. Mental status is at baseline.      Sensory: No sensory deficit.      Motor: No weakness.      Coordination: Coordination normal.      Comments: Oriented to person.  Disoriented to date   Psychiatric:         Mood and Affect: Mood normal.         Behavior: Behavior normal.           Results Reviewed:  I have personally reviewed current lab, radiology, and data and agree with results.  Lab Results (last 24 hours)       Procedure Component Value Units Date/Time    Urinalysis With Microscopic If Indicated (No Culture) - Urine, Clean Catch [[id number]]  (Abnormal) Collected: 08/10/23  2040    Specimen: Urine, Clean Catch Updated: 08/10/23 2048     Color, UA Yellow     Appearance, UA Cloudy     pH, UA 7.0     Specific Gravity, UA 1.009     Glucose, UA Negative     Ketones, UA 15 mg/dL (1+)     Bilirubin, UA Negative     Blood, UA Negative     Protein, UA Trace     Leuk Esterase, UA Trace     Nitrite, UA Negative     Urobilinogen, UA 0.2 E.U./dL    Urinalysis, Microscopic Only - Urine, Clean Catch [[id number]]  (Abnormal) Collected: 08/10/23 2040    Specimen: Urine, Clean Catch Updated: 08/10/23 2048     RBC, UA 0-2 /HPF      WBC, UA 0-2 /HPF      Bacteria, UA 1+ /HPF      Squamous Epithelial Cells, UA 3-5 /HPF      Hyaline Casts, UA None Seen /LPF      Methodology Automated Microscopy    [facility] Draw [[id number]] Collected: 08/10/23 1722    Specimen: Blood Updated: 08/10/23 1832    Narrative:      The following orders were created for panel order [facility] Draw.  Procedure                               Abnormality         Status                     ---------                               -----------         ------                     Green Top (Gel)[[id number]]                                  Final result               Lavender Top[[id number]]                                     Final result               Gold Top - SST[[id number]]                                   Final result               Light Blue Top[[id number]]                                   Final result                 Please view results for these tests on the individual orders.    Green Top (Gel) [[id number]] Collected: 08/10/23 1722    Specimen: Blood Updated: 08/10/23 1832     Extra Tube Hold for add-ons.     Comment: Auto resulted.       Lavender Top [[id number]] Collected: 08/10/23 1722    Specimen: Blood Updated: 08/10/23 1832     Extra Tube hold for add-on     Comment: Auto resulted       Gold Top - SST [[id number]] Collected: 08/10/23 1722    Specimen: Blood Updated: 08/10/23 1832     Extra Tube Hold for add-ons.     Comment: Auto  resulted.       Light Blue Top [[id number]] Collected: 08/10/23 1722    Specimen: Blood Updated: 08/10/23 1832     Extra Tube Hold for add-ons.     Comment: Auto resulted       Comprehensive Metabolic Panel [[id number]]  (Abnormal) Collected: 08/10/23 1722    Specimen: Blood Updated: 08/10/23 1827     Glucose 109 mg/dL      BUN 20 mg/dL      Creatinine 1.43 mg/dL      Sodium 139 mmol/L      Potassium 4.5 mmol/L      Comment: Slight hemolysis detected by analyzer. Results may be affected.        Chloride 102 mmol/L      CO2 23.0 mmol/L      Calcium 8.7 mg/dL      Total Protein 8.3 g/dL      Albumin 3.4 g/dL      ALT (SGPT) 13 U/L      AST (SGOT) 25 U/L      Comment: Slight hemolysis detected by analyzer. Results may be affected.        Alkaline Phosphatase 168 U/L      Total Bilirubin 0.2 mg/dL      Globulin 4.9 gm/dL      A/G Ratio 0.7 g/dL      BUN/Creatinine Ratio 14.0     Anion Gap 14.0 mmol/L      eGFR 36.7 mL/min/1.73     Narrative:      GFR Normal >60  Chronic Kidney Disease <60  Kidney Failure <15    The GFR formula is only valid for adults with stable renal function between ages 18 and 70.    Lipase [[id number]]  (Normal) Collected: 08/10/23 1722    Specimen: Blood Updated: 08/10/23 1827     Lipase 19 U/L     Lactic Acid, Plasma [[id number]]  (Normal) Collected: 08/10/23 1722    Specimen: Blood Updated: 08/10/23 1821     Lactate 1.2 mmol/L     CBC & Differential [[id number]]  (Abnormal) Collected: 08/10/23 1722    Specimen: Blood Updated: 08/10/23 1806    Narrative:      The following orders were created for panel order CBC & Differential.  Procedure                               Abnormality         Status                     ---------                               -----------         ------                     CBC Auto Differential[[id number]]        Abnormal            Final result                 Please view results for these tests on the individual orders.    CBC Auto Differential [[id number]]  (Abnormal)  Collected: 08/10/23 1722    Specimen: Blood Updated: 08/10/23 1806     WBC 10.98 10*3/mm3      RBC 3.95 10*6/mm3      Hemoglobin 11.8 g/dL      Hematocrit 35.9 %      MCV 90.9 fL      MCH 29.9 pg      MCHC 32.9 g/dL      RDW 14.2 %      RDW-SD 46.6 fl      MPV 9.2 fL      Platelets 244 10*3/mm3      Neutrophil % 77.3 %      Lymphocyte % 12.8 %      Monocyte % 7.3 %      Eosinophil % 1.0 %      Basophil % 1.2 %      Immature Grans % 0.4 %      Neutrophils, Absolute 8.49 10*3/mm3      Lymphocytes, Absolute 1.41 10*3/mm3      Monocytes, Absolute 0.80 10*3/mm3      Eosinophils, Absolute 0.11 10*3/mm3      Basophils, Absolute 0.13 10*3/mm3      Immature Grans, Absolute 0.04 10*3/mm3      nRBC 0.0 /100 WBC           Imaging Results (Last 24 Hours)       Procedure Component Value Units Date/Time    CT Abdomen Pelvis Without Contrast [[id number]] Collected: 08/10/23 2049     Updated: 08/10/23 2116    Narrative:      EXAM:  CT abdomen and pelvis without contrast.    COMPARISON:  CT abdomen and pelvis without contrast, January 9, 2023    HISTORY:  Flank pain, unspecified side.  Kidney stone suspected.    TECHNIQUE:  Axial images from the level of the diaphragms through the pelvis were performed  followed by 2D multiplanar reformats.    FINDINGS:  Small bilateral pleural effusions with mild bibasilar atelectasis.  Heart size  is grossly within normal limits.    Liver, spleen, pancreas, adrenal glands and left kidney are unremarkable for a  noncontrast exam.  Redemonstration of right-sided hydronephrosis which has the  appearance of UPJ obstruction.  This is grossly stable.  However, this could  also be due to a large renal cyst or parapelvic cysts.    No lymphadenopathy identified in the abdomen or pelvis by size criteria.    Bladder is unremarkable.  Uterus is small and atrophic which is not unexpected  for age.  There is colonic diverticulitis without evidence of acute  diverticulitis.  The appendix is not definitely seen.   No free fluid.    No acute osseous abnormality.      Impression:      1.  There is redemonstration of a large cystic structure in the region of the  right renal pelvis.  It is uncertain if this represents a large renal cyst or  peripelvic renal cysts or if this may represent a dilated renal pelvis in the  setting of UPJ obstruction.  There does appear to be a component of  hydronephrosis on today's exam with dilatation of renal calyces.  This was not  seen on the prior exam.  As a result, this represents UPJ obstruction, this is  presumably worsening.  Alternatively, this could represent a peripelvic cyst  with interval development of right-sided hydronephrosis due to ureteral  obstruction, stricture or lesion.  There is no discrete ureteral stone  identified.    2.  Small bilateral pleural effusions.    Additional findings as described above.              Assessment:    There are no active hospital problems to display for this patient.    # Atrial fibrillation with rapid ventricular response   # Large cystic structure in the region of right renal pelvis unclear etiology  # Right hydronephrosis potentially secondary to above mass  # Chronic kidney disease stage III  # Elevated troponin level with no chest pain   # Elevated proBNP level nonspecific, patient does not show signs symptoms of fluid overload  # Mild leukocytosis  # Coronary artery disease with history of  STEMI in 2022 with  stenting  # Diabetes mellitus type 2   # Hypertension, uncontrolled  # Chronic anemia  # Obesity with BMI of 34        Treatment plan:  I discussed the care with ED attending Dr. Golden.  And patient  I reviewed independently laboratory workup imaging studies and prior record as much as possible  Admit to stepdown unit  Place on telemetry  Obtain troponin level.  Follow EKGs.  Obtain further laboratory workup including TSH level hemoglobin A1c level magnesium level  Follow CBC BMP magnesium level  Obtain echocardiogram  Give 10 mg IV  Cardizem bolus  Maintain on Cardizem drip for heart rate control.  See response of uncontrolled hypertension to Cardizem drip and her outpatient medications.  Consider placing on labetalol IV as needed hydralazine IV as needed for blood pressure control with holding parameters  Continue outpatient medication aspirin Brilinta Coreg for cardiac care  Refill consider obtaining urology service consultation in a.m considering above large cystic structure with hydronephrosis.  Her UA not suggestive of any infection.  We will follow clinical status closely.  Hold initiation of any systemic anticoagulation therapy at this time considering need for urology service evaluation.  Avoid electrolyte abnormalities in particular hypokalemia hypomagnesemia considering A-fib with RVR  Accu-Chek ACHS  Insulin sliding scale  Diabetic diet  Comorbidities, chronic medical problems will be treated appropriately  Reconcile home medication continue with essential home medications.  Medically supervised weight reduction recommended.  Please see orders for comprehensive plan.          Medical Decision Making  Number and Complexity of problems: Acute medical problems as above.  Complex decision making.  Differential Diagnosis: Entertained considered and reflected in orders.    Conditions and Status:        Condition is worsening.     MDM Data  External documents reviewed:   My EKG interpretation: Atrial fibrillation with rapid ventricular response and ST-T abnormalities  My CT interpretation: Large cystic lesion with hydronephrosis  Tests considered but not ordered:      Decision rules/scores evaluated (example [id number], Wells, etc):      Discussed with: ED attending Dr. Nicholas and patient.   I have utilized all available immediate resources to obtain, update, or review the patient's current medications (including all prescriptions, over-the-counter products, herbals, cannabis/cannabidiol products, and vitamin/mineral/dietary (nutritional)  supplements).          Care Planning  Shared decision making: ED attending Dr. Dowling and patient.  Code status and discussions: Patient decided for full code.  Patient decided that his son Darshan be his healthcare proxy    Disposition  Social Determinants of Health that impact treatment or disposition:   I expect the patient to be discharged to nursing home in 2 to days.          I confirmed that the patient's Advance Care Plan is present, code status is documented, or surrogate decision maker is listed in the patient's medical record.     I have utilized all available immediate resources to obtain, update, or review the patient's current medications.     I discussed the patient's findings and my recommendations with: Patient and she agreed with above plan of care.      Saeid Behroozi, MD   08/10/23   21:42 CDT

## 2023-08-11 NOTE — CONSULTS
[facility]   Consult Note    Patient Name: Magy Barton  : 1940  MRN: [id number]  Primary Care Physician:  Sean Briones MD  Referring Physician: James Golden MD  Date of admission: 8/10/2023    Inpatient Urology Consult  Consult performed by: Gray Bartholomew APRN  Consult ordered by: Behroozi, Saeid, MD      Subjective   Subjective     Reason for Consult/ Chief Complaint: UPJ obstruction    Abdominal Pain    Magy Barton is a 82 y.o. female consulted to Urology for right UPJ obstruction and worsening hydronephrosis without stone identified, she is in ICU AFib RVR, urine culture IP, denies abdominal pain this morning but has been intermittent, no nausea or vomiting, no hematuria, afebrile.     Review of Systems   Gastrointestinal:  Positive for abdominal pain.   All other systems reviewed and are negative.     Personal History     Past Medical History:   Diagnosis Date    Cancer     leukemia    Coronary artery disease     Diabetes mellitus     Hypertension     Injury of back     Myocardial infarction     Stroke        Past Surgical History:   Procedure Laterality Date    CARDIAC CATHETERIZATION Left 2022    Procedure: Left Heart Cath;  Surgeon: Ayaz Wild DO;  Location: [facility] CATH INVASIVE LOCATION;  Service: Cardiology;  Laterality: Left;    CARDIAC CATHETERIZATION N/A 2022    Procedure: PERCUTANEOUS CORONARY INTERVENTION;  Surgeon: Ayaz Wild DO;  Location: [facility] CATH INVASIVE LOCATION;  Service: Cardiology;  Laterality: N/A;    CATARACT EXTRACTION      TRIGEMINAL NERVE DECOMPRESSION      TUBAL ABDOMINAL LIGATION      UVULOPALATOPHARYNGOPLASTY         Family History: family history is not on file. Otherwise pertinent FHx was reviewed and not pertinent to current issue.    Social History:  reports that she has never smoked. She has never used smokeless tobacco. She reports that she does not drink alcohol and does not use drugs.    Home Medications:   Aspirin,  BASAGLAR KWIKPEN, Docusate Sodium, Glucagon, Mirabegron ER, acetaminophen, albuterol, amLODIPine, carBAMazepine, carvedilol, lisinopril, melatonin, nitroglycerin, polyethylene glycol, rosuvastatin, ticagrelor, and venlafaxine    Allergies:  Allergies   Allergen Reactions    Sulfa Antibiotics Itching and Rash     Patient states she had the Worse yeast infection ever.  Reaction: rash  Patient states she had the Worse yeast infection ever.      Atorvastatin      Memory    Levofloxacin     Meclizine     Morphine        Objective    Objective     Vitals:  Temp:  [97.5 øF (36.4 øC)-97.7 øF (36.5 øC)] 97.5 øF (36.4 øC)  Heart Rate:  [] 92  Resp:  [16-20] 20  BP: (135-194)/() 162/77  Flow (L/min):  [2] 2    Physical Exam  Constitutional:       Appearance: She is ill-appearing.   HENT:      Head: Normocephalic and atraumatic.      Nose: Nose normal.      Mouth/Throat:      Mouth: Mucous membranes are moist.   Eyes:      General: No scleral icterus.        Right eye: No discharge.         Left eye: No discharge.      Pupils: Pupils are equal, round, and reactive to light.   Cardiovascular:      Rate and Rhythm: Normal rate.   Pulmonary:      Effort: Pulmonary effort is normal. No respiratory distress.   Abdominal:      General: There is no distension.      Palpations: Abdomen is soft.      Tenderness: There is no abdominal tenderness.   Musculoskeletal:         General: No swelling, tenderness or signs of injury.   Skin:     General: Skin is warm and dry.      Capillary Refill: Capillary refill takes less than 2 seconds.      Coloration: Skin is pale.   Neurological:      General: No focal deficit present.      Mental Status: She is alert and oriented to person, place, and time. Mental status is at baseline.   Psychiatric:         Mood and Affect: Mood normal.         Behavior: Behavior normal.         Thought Content: Thought content normal.       Result Review    Result Review:  I have personally reviewed the  results from the time of this admission to 8/11/2023 11:29 CDT and agree with these findings:  [x]  Laboratory list / accordion  [x]  Microbiology  [x]  Radiology  []  EKG/Telemetry   []  Cardiology/Vascular   []  Pathology  []  Old records  []  Other:  Most notable findings include:     Imaging Results (Last 48 Hours)       Procedure Component Value Units Date/Time    CT Abdomen Pelvis Without Contrast [[id number]] Collected: 08/10/23 2049     Updated: 08/10/23 2116    Narrative:      EXAM:  CT abdomen and pelvis without contrast.    COMPARISON:  CT abdomen and pelvis without contrast, January 9, 2023    HISTORY:  Flank pain, unspecified side.  Kidney stone suspected.    TECHNIQUE:  Axial images from the level of the diaphragms through the pelvis were performed  followed by 2D multiplanar reformats.    FINDINGS:  Small bilateral pleural effusions with mild bibasilar atelectasis.  Heart size  is grossly within normal limits.    Liver, spleen, pancreas, adrenal glands and left kidney are unremarkable for a  noncontrast exam.  Redemonstration of right-sided hydronephrosis which has the  appearance of UPJ obstruction.  This is grossly stable.  However, this could  also be due to a large renal cyst or parapelvic cysts.    No lymphadenopathy identified in the abdomen or pelvis by size criteria.    Bladder is unremarkable.  Uterus is small and atrophic which is not unexpected  for age.  There is colonic diverticulitis without evidence of acute  diverticulitis.  The appendix is not definitely seen.  No free fluid.    No acute osseous abnormality.      Impression:      1.  There is redemonstration of a large cystic structure in the region of the  right renal pelvis.  It is uncertain if this represents a large renal cyst or  peripelvic renal cysts or if this may represent a dilated renal pelvis in the  setting of UPJ obstruction.  There does appear to be a component of  hydronephrosis on today's exam with dilatation of renal  calyces.  This was not  seen on the prior exam.  As a result, this represents UPJ obstruction, this is  presumably worsening.  Alternatively, this could represent a peripelvic cyst  with interval development of right-sided hydronephrosis due to ureteral  obstruction, stricture or lesion.  There is no discrete ureteral stone  identified.    2.  Small bilateral pleural effusions.    Additional findings as described above.               Assessment & Plan   Assessment / Plan     Brief Patient Summary:  Magy Barton is a 82 y.o. female who has right hydronephrosis and UPJ obstruction    Estimated Creatinine Clearance: 31.2 mL/min (A) (by C-G formula based on SCr of 1.43 mg/dL (H)).      Active Hospital Problems:  Active Hospital Problems    Diagnosis     **Atrial fibrillation with rapid ventricular response     A-fib     Atrial fibrillation with RVR      Plan:   Plan retrograde study when medically improved    MIKA Berg

## 2023-08-11 NOTE — PROGRESS NOTES
TWO PATIENT IDENTIFIERS WERE USED. THE PATIENT WAS DRAPED WITH A FULL BODY DRAPE AND THE PATIENT'S RIGHT ARM WAS PREPPED WITH CHLORA PREP. ULTRASOUND WAS USED TO LOCALIZE THERIGHT BASILIC VEIN. SUBCUTANEOUS TISSUE AT THE CATHETER SITE WAS INFILTRATED WITH 2% LIDOCAINE. UNDER ULTRASOUND GUIDANCE, THE VEIN WAS ACCESSED WITH A 21 GAUGE  NEEDLE. AN 0.018 WIRE WAS THEN THREADED THROUGH THE NEEDLE. THE 21 GAUGE NEEDLE WAS REMOVED AND A 4 Tajik SHEATH WAS PLACED OVER THE WIRE INTO THE VEIN.THE MIDLINE CATHETER WAS TRIMMED TO 20CM. THE MIDLINE CATHETER WAS THEN PLACED OVER THE WIRE INTO THE VEIN, THE SHEATH WAS PEELED AWAY, WIRE WAS REMOVED. CATHETER WAS FLUSHED WITH NORMAL SALINE AND CATHETER TIP APPLIED. BIOPATCH PLACED. CATHETER SECURED WITH STAT LOCK AND TEGADERM. PATIENT TOLERATED PROCEDURE WELL. THIS WAS DONE AT BEDSIDE      IMPRESSION:SUCCESSFUL PLACEMENT OF DUAL LUMEN MIDLINE.           Hiral Delarosa  8/11/2023  13:34 CDT

## 2023-08-12 LAB
ALBUMIN SERPL-MCNC: 3.1 G/DL (ref 3.5–5.2)
ALBUMIN/GLOB SERPL: 0.7 G/DL
ALP SERPL-CCNC: 154 U/L (ref 39–117)
ALT SERPL W P-5'-P-CCNC: 14 U/L (ref 1–33)
ANION GAP SERPL CALCULATED.3IONS-SCNC: 12 MMOL/L (ref 5–15)
AST SERPL-CCNC: 26 U/L (ref 1–32)
BACTERIA SPEC AEROBE CULT: NORMAL
BILIRUB SERPL-MCNC: 0.2 MG/DL (ref 0–1.2)
BUN SERPL-MCNC: 18 MG/DL (ref 8–23)
BUN/CREAT SERPL: 12.3 (ref 7–25)
CALCIUM SPEC-SCNC: 8.6 MG/DL (ref 8.6–10.5)
CHLORIDE SERPL-SCNC: 102 MMOL/L (ref 98–107)
CO2 SERPL-SCNC: 25 MMOL/L (ref 22–29)
CREAT SERPL-MCNC: 1.46 MG/DL (ref 0.57–1)
DEPRECATED RDW RBC AUTO: 48.9 FL (ref 37–54)
EGFRCR SERPLBLD CKD-EPI 2021: 35.8 ML/MIN/1.73
ERYTHROCYTE [DISTWIDTH] IN BLOOD BY AUTOMATED COUNT: 14.5 % (ref 12.3–15.4)
GLOBULIN UR ELPH-MCNC: 4.6 GM/DL
GLUCOSE BLDC GLUCOMTR-MCNC: 139 MG/DL (ref 70–130)
GLUCOSE BLDC GLUCOMTR-MCNC: 164 MG/DL (ref 70–130)
GLUCOSE SERPL-MCNC: 115 MG/DL (ref 65–99)
HCT VFR BLD AUTO: 35.7 % (ref 34–46.6)
HGB BLD-MCNC: 11 G/DL (ref 12–15.9)
MCH RBC QN AUTO: 28.9 PG (ref 26.6–33)
MCHC RBC AUTO-ENTMCNC: 30.8 G/DL (ref 31.5–35.7)
MCV RBC AUTO: 93.7 FL (ref 79–97)
PLATELET # BLD AUTO: 263 10*3/MM3 (ref 140–450)
PMV BLD AUTO: 9.1 FL (ref 6–12)
POTASSIUM SERPL-SCNC: 4.1 MMOL/L (ref 3.5–5.2)
PROT SERPL-MCNC: 7.7 G/DL (ref 6–8.5)
RBC # BLD AUTO: 3.81 10*6/MM3 (ref 3.77–5.28)
SODIUM SERPL-SCNC: 139 MMOL/L (ref 136–145)
WBC NRBC COR # BLD: 9.05 10*3/MM3 (ref 3.4–10.8)

## 2023-08-12 PROCEDURE — G0378 HOSPITAL OBSERVATION PER HR: HCPCS

## 2023-08-12 PROCEDURE — 25010000002 HYDRALAZINE PER 20 MG: Performed by: INTERNAL MEDICINE

## 2023-08-12 PROCEDURE — 25010000002 CEFTRIAXONE PER 250 MG: Performed by: INTERNAL MEDICINE

## 2023-08-12 PROCEDURE — 82948 REAGENT STRIP/BLOOD GLUCOSE: CPT

## 2023-08-12 PROCEDURE — 85027 COMPLETE CBC AUTOMATED: CPT | Performed by: INTERNAL MEDICINE

## 2023-08-12 PROCEDURE — 80053 COMPREHEN METABOLIC PANEL: CPT | Performed by: INTERNAL MEDICINE

## 2023-08-12 PROCEDURE — 25010000002 HEPARIN (PORCINE) PER 1000 UNITS: Performed by: INTERNAL MEDICINE

## 2023-08-12 RX ORDER — DILTIAZEM HYDROCHLORIDE 60 MG/1
60 TABLET, FILM COATED ORAL EVERY 8 HOURS SCHEDULED
Status: DISCONTINUED | OUTPATIENT
Start: 2023-08-12 | End: 2023-08-16 | Stop reason: HOSPADM

## 2023-08-12 RX ADMIN — DILTIAZEM HYDROCHLORIDE 60 MG: 60 TABLET, FILM COATED ORAL at 22:03

## 2023-08-12 RX ADMIN — HEPARIN SODIUM 5000 UNITS: 5000 INJECTION INTRAVENOUS; SUBCUTANEOUS at 22:02

## 2023-08-12 RX ADMIN — CARVEDILOL 12.5 MG: 12.5 TABLET, FILM COATED ORAL at 18:02

## 2023-08-12 RX ADMIN — TICAGRELOR 90 MG: 90 TABLET ORAL at 09:04

## 2023-08-12 RX ADMIN — CEFTRIAXONE SODIUM 2000 MG: 2 INJECTION, POWDER, FOR SOLUTION INTRAMUSCULAR; INTRAVENOUS at 13:01

## 2023-08-12 RX ADMIN — DOCUSATE SODIUM 50 MG AND SENNOSIDES 8.6 MG 2 TABLET: 8.6; 5 TABLET, FILM COATED ORAL at 22:03

## 2023-08-12 RX ADMIN — Medication 10 ML: at 22:03

## 2023-08-12 RX ADMIN — SODIUM CHLORIDE 15 MG/HR: 900 INJECTION, SOLUTION INTRAVENOUS at 18:58

## 2023-08-12 RX ADMIN — DOCUSATE SODIUM 50 MG AND SENNOSIDES 8.6 MG 2 TABLET: 8.6; 5 TABLET, FILM COATED ORAL at 09:03

## 2023-08-12 RX ADMIN — HEPARIN SODIUM 5000 UNITS: 5000 INJECTION INTRAVENOUS; SUBCUTANEOUS at 09:04

## 2023-08-12 RX ADMIN — ROSUVASTATIN CALCIUM 20 MG: 10 TABLET, FILM COATED ORAL at 22:03

## 2023-08-12 RX ADMIN — CARVEDILOL 12.5 MG: 12.5 TABLET, FILM COATED ORAL at 09:04

## 2023-08-12 RX ADMIN — DILTIAZEM HYDROCHLORIDE 60 MG: 60 TABLET, FILM COATED ORAL at 16:28

## 2023-08-12 RX ADMIN — Medication 10 ML: at 09:05

## 2023-08-12 RX ADMIN — TICAGRELOR 90 MG: 90 TABLET ORAL at 22:02

## 2023-08-12 RX ADMIN — LISINOPRIL 40 MG: 40 TABLET ORAL at 09:04

## 2023-08-12 RX ADMIN — HYDRALAZINE HYDROCHLORIDE 10 MG: 20 INJECTION INTRAMUSCULAR; INTRAVENOUS at 01:37

## 2023-08-12 RX ADMIN — ASPIRIN 81 MG: 81 TABLET, COATED ORAL at 09:04

## 2023-08-12 NOTE — PROGRESS NOTES
[facility] Medicine Services  INPATIENT PROGRESS NOTE      Length of Stay: 1  Date of Admission: 8/10/2023  Primary Care Physician: Sean Briones MD    Subjective   Chief Complaint:   No complaints on day of exam  HPI:  Patient is a 82-year-old obese female with known past medical history of hospitalization in September 2022 for STEMI with intervention to LAD and RCA, as history of diabetes mellitus type 2 chronic kidney disease anemia hypertension CVA, resident of nursing facility was sent to ED per ED record concerning possible kidney stone not further specified.  Patient denied any particular complaint in ED.  She had a abnormal CT of the abdomen and pelvis with large cyst.  Patient was found to be in A-fib with RVR.  Hospitalist service was called for observation of the patient.  I discussed with ED attending Dr. Golden.     I have seen and examined patient in ED room 11.  Patient resting in bed on room air denies any particular complaint.  On questioning why she is in the ER she states I do not know.  On questioning patient denies any fall injury trauma fever chills headache chest pain shortness of breath back pain abdominal pain URI UTI-like symptoms, constipation diarrhea nausea vomiting, bleeding in particular.    Daily assessment 8/11/2023  On evaluation the patient is currently lying in bed.  Is stable.  Denies any abdominal pain no nausea or vomiting on evaluation.  No hematuria is noted.  The patient vital signs are stable patient is afebrile.      Review of Systems   Constitutional:  Positive for fatigue. Negative for chills and fever.   HENT: Negative.  Negative for congestion.    Eyes: Negative.    Respiratory: Negative.  Negative for cough and shortness of breath.    Cardiovascular: Negative.  Negative for chest pain and palpitations.   Gastrointestinal:  Positive for abdominal pain, nausea and vomiting. Negative for abdominal distention.    Endocrine: Negative.    Genitourinary: Negative.    Musculoskeletal: Negative.    Skin: Negative.    Neurological:  Positive for weakness. Negative for dizziness.   Hematological: Negative.    Psychiatric/Behavioral: Negative.        All pertinent negatives and positives are as above. All other systems have been reviewed and are negative unless otherwise stated.     Objective    As of today 08/11/23  Temp:  [97.4 øF (36.3 øC)-97.8 øF (36.6 øC)] 97.8 øF (36.6 øC)  Heart Rate:  [] 103  Resp:  [16-20] 20  BP: (128-194)/() 131/67    Physical Exam  Vitals and nursing note reviewed.   HENT:      Head: Normocephalic and atraumatic.      Right Ear: External ear normal.      Left Ear: External ear normal.      Nose: Nose normal.      Mouth/Throat:      Mouth: Mucous membranes are moist.      Pharynx: Oropharynx is clear.   Eyes:      General: No scleral icterus.     Extraocular Movements: Extraocular movements intact.      Conjunctiva/sclera: Conjunctivae normal.      Pupils: Pupils are equal, round, and reactive to light.   Cardiovascular:      Rate and Rhythm: Normal rate and regular rhythm.      Heart sounds: No murmur heard.  Pulmonary:      Effort: Pulmonary effort is normal.      Breath sounds: Normal breath sounds.   Abdominal:      General: Bowel sounds are normal.      Palpations: Abdomen is soft.      Tenderness: There is abdominal tenderness.   Musculoskeletal:         General: Normal range of motion.      Cervical back: Normal range of motion and neck supple.   Skin:     General: Skin is warm and dry.      Capillary Refill: Capillary refill takes less than 2 seconds.   Neurological:      General: No focal deficit present.      Mental Status: She is alert and oriented to person, place, and time.      Motor: Weakness present.   Psychiatric:         Mood and Affect: Mood normal.         Behavior: Behavior normal.         aspirin, 81 mg, Oral, Daily  carvedilol, 12.5 mg, Oral, BID With  Meals  cefTRIAXone, 2,000 mg, Intravenous, Q24H  heparin (porcine), 5,000 Units, Subcutaneous, Q12H  lisinopril, 40 mg, Oral, Daily  rosuvastatin, 20 mg, Oral, Nightly  senna-docusate sodium, 2 tablet, Oral, BID  sodium chloride, 10 mL, Intravenous, Q12H  ticagrelor, 90 mg, Oral, BID         Results Review:  I have reviewed the labs, radiology results, and diagnostic studies.    Laboratory Data:   Results from last 7 days   Lab Units 08/11/23  1139 08/10/23  1722   SODIUM mmol/L 141 139   POTASSIUM mmol/L 3.9 4.5   CHLORIDE mmol/L 102 102   CO2 mmol/L 24.0 23.0   BUN mg/dL 20 20   CREATININE mg/dL 1.34* 1.43*   GLUCOSE mg/dL 111* 109*   CALCIUM mg/dL 8.8 8.7   BILIRUBIN mg/dL  --  0.2   ALK PHOS U/L  --  168*   ALT (SGPT) U/L  --  13   AST (SGOT) U/L  --  25   ANION GAP mmol/L 15.0 14.0     Estimated Creatinine Clearance: 33.3 mL/min (A) (by C-G formula based on SCr of 1.34 mg/dL (H)).  Results from last 7 days   Lab Units 08/11/23  1139   MAGNESIUM mg/dL 2.3         Results from last 7 days   Lab Units 08/11/23  1139 08/10/23  1722   WBC 10*3/mm3 8.35 10.98*   HEMOGLOBIN g/dL 11.4* 11.8*   HEMATOCRIT % 34.9 35.9   PLATELETS 10*3/mm3 228 244           Culture Data:   No results found for: BLOODCX  No results found for: URINECX  No results found for: RESPCX  No results found for: WOUNDCX  No results found for: STOOLCX  No components found for: BODYFLD    Radiology Data:   Imaging Results (Last 24 Hours)       Procedure Component Value Units Date/Time    US Guided Vascular Access [[id number]] Collected: 08/11/23 1413     Updated: 08/11/23 1630    Narrative:      Targeted grayscale ultrasound with compression of the right basilic vein  demonstrates patency.    IR Insert Midline Without Port Pump 5 Plus [[id number]] Resulted: 08/11/23 1335     Updated: 08/11/23 1338    Narrative:      This procedure was auto-finalized with no dictation required.    CT Abdomen Pelvis Without Contrast [[id number]] Collected: 08/10/23 2047      Updated: 08/10/23 2116    Narrative:      EXAM:  CT abdomen and pelvis without contrast.    COMPARISON:  CT abdomen and pelvis without contrast, January 9, 2023    HISTORY:  Flank pain, unspecified side.  Kidney stone suspected.    TECHNIQUE:  Axial images from the level of the diaphragms through the pelvis were performed  followed by 2D multiplanar reformats.    FINDINGS:  Small bilateral pleural effusions with mild bibasilar atelectasis.  Heart size  is grossly within normal limits.    Liver, spleen, pancreas, adrenal glands and left kidney are unremarkable for a  noncontrast exam.  Redemonstration of right-sided hydronephrosis which has the  appearance of UPJ obstruction.  This is grossly stable.  However, this could  also be due to a large renal cyst or parapelvic cysts.    No lymphadenopathy identified in the abdomen or pelvis by size criteria.    Bladder is unremarkable.  Uterus is small and atrophic which is not unexpected  for age.  There is colonic diverticulitis without evidence of acute  diverticulitis.  The appendix is not definitely seen.  No free fluid.    No acute osseous abnormality.      Impression:      1.  There is redemonstration of a large cystic structure in the region of the  right renal pelvis.  It is uncertain if this represents a large renal cyst or  peripelvic renal cysts or if this may represent a dilated renal pelvis in the  setting of UPJ obstruction.  There does appear to be a component of  hydronephrosis on today's exam with dilatation of renal calyces.  This was not  seen on the prior exam.  As a result, this represents UPJ obstruction, this is  presumably worsening.  Alternatively, this could represent a peripelvic cyst  with interval development of right-sided hydronephrosis due to ureteral  obstruction, stricture or lesion.  There is no discrete ureteral stone  identified.    2.  Small bilateral pleural effusions.    Additional findings as described above.            I have  reviewed the patient's current medications.     Assessment/Plan     Principal Problem:    Atrial fibrillation with rapid ventricular response  Active Problems:    Atrial fibrillation with RVR    A-fib    Impression/plan  A-fib with RVR  Cardiac monitoring  Cardizem drip  Check echocardiogram  Check TSH      Right hydronephrosis secondary to pelvic mass  Urology consult      Chronic kidney disease stage IIIb  Continue to monitor kidney function    Coronary artery disease with history of stent 2022    Diabetes mellitus type 2  Sliding scale insulin  Check A1c  ADA cardiac diet    Hypertension  Current blood pressure 131/67  O2 saturation 96%    Chronic anemia    Obesity with BMI of 34  Continue nutritional support    Medical Decision Making  Number and Complexity of problems: Acute medical problems as above.  Complex decision making.  Differential Diagnosis: Entertained considered and reflected in orders.     Conditions and Status:        Condition is worsening.     [facility] Data  External documents reviewed:   My EKG interpretation: Atrial fibrillation with rapid ventricular response and ST-T abnormalities  My CT interpretation: Large cystic lesion with hydronephrosis  Tests considered but not ordered:      Decision rules/scores evaluated (example [id number], [place], etc):      Discussed with: ED attending Dr. Nicholas and patient.   I have utilized all available immediate resources to obtain, update, or review the patient's current medications (including all prescriptions, over-the-counter products, herbals, cannabis/cannabidiol products, and vitamin/mineral/dietary (nutritional) supplements).             Care Planning  Shared decision making: ED attending Dr. Dowling and patient.  Code status and discussions: Patient decided for full code.  Patient decided that his son Darshan be his healthcare proxy     Disposition  Social Determinants of Health that impact treatment or disposition:   I expect the patient to be discharged to  nursing home in 2 to days.             I confirmed that the patient's Advance Care Plan is present, code status is documented, or surrogate decision maker is listed in the patient's medical record.      I have utilized all available immediate resources to obtain, update, or review the patient's current medications.      I discussed the patient's findings and my recommendations with: Patient and she agreed with above plan of care.    Reid Escobar, DO

## 2023-08-12 NOTE — PROGRESS NOTES
LOS: 1 day     Patient Care Team:  Sean Briones MD as PCP - General (Family Medicine)      Subjective     Right UPJ stone stone with hydronephrosis    Objective       Vital Signs  Temp:  [97.4 øF (36.3 øC)-97.8 øF (36.6 øC)] 97.6 øF (36.4 øC)  Heart Rate:  [] 111  Resp:  [14-20] 14  BP: (108-201)/() 108/62    Physical Exam:        General Appearance:  No acute distress resting     Respiratory:    UNLABORED RESPIRATIONS.     Abdomen:     SOFT.       Genitourinary: Urine clear     Rectal:     DEFERRED       Results Review:       Imaging Results (Last 24 Hours)       Procedure Component Value Units Date/Time    US Guided Vascular Access [[id number]] Collected: 08/11/23 1413     Updated: 08/11/23 1630    Narrative:      Targeted grayscale ultrasound with compression of the right basilic vein  demonstrates patency.    IR Insert Midline Without Port Pump 5 Plus [[id number]] Resulted: 08/11/23 1335     Updated: 08/11/23 1335    Narrative:      This procedure was auto-finalized with no dictation required.          Lab Results (last 24 hours)       Procedure Component Value Units Date/Time    Comprehensive Metabolic Panel [[id number]]  (Abnormal) Collected: 08/12/23 0526    Specimen: Blood Updated: 08/12/23 0603     Glucose 115 mg/dL      BUN 18 mg/dL      Creatinine 1.46 mg/dL      Sodium 139 mmol/L      Potassium 4.1 mmol/L      Comment: Slight hemolysis detected by analyzer. Results may be affected.        Chloride 102 mmol/L      CO2 25.0 mmol/L      Calcium 8.6 mg/dL      Total Protein 7.7 g/dL      Albumin 3.1 g/dL      ALT (SGPT) 14 U/L      AST (SGOT) 26 U/L      Comment: Slight hemolysis detected by analyzer. Results may be affected.        Alkaline Phosphatase 154 U/L      Total Bilirubin 0.2 mg/dL      Globulin 4.6 gm/dL      A/G Ratio 0.7 g/dL      BUN/Creatinine Ratio 12.3     Anion Gap 12.0 mmol/L      eGFR 35.8 mL/min/1.73     Narrative:      GFR Normal >60  Chronic Kidney Disease <60  Kidney  Failure <15    The GFR formula is only valid for adults with stable renal function between ages 18 and 70.    CBC (No Diff) [[id number]]  (Abnormal) Collected: 08/12/23 0526    Specimen: Blood Updated: 08/12/23 0543     WBC 9.05 10*3/mm3      RBC 3.81 10*6/mm3      Hemoglobin 11.0 g/dL      Hematocrit 35.7 %      MCV 93.7 fL      MCH 28.9 pg      MCHC 30.8 g/dL      RDW 14.5 %      RDW-SD 48.9 fl      MPV 9.1 fL      Platelets 263 10*3/mm3     POC Glucose Once [[id number]]  (Abnormal) Collected: 08/11/23 2123    Specimen: Blood Updated: 08/11/23 2140     Glucose 143 mg/dL      Comment: RN NotifiedOperator: [id number] PADILLA Bryan ID: [id number]       CRE Screen by PCR - Swab, Large Intestine, Rectum [[id number]] Collected: 08/11/23 1712    Specimen: Swab from Large Intestine, Rectum Updated: 08/11/23 1844     CRE SCREEN Not Detected     Comment: Test performed by real-time polymerase chain reaction (qPCR).        OXA 48 Strain Not Detected     IMP STRAIN Not Detected     VIM STRAIN Not Detected     NDM Strain Not Detected     KPC Strain Not Detected    POC Glucose Once [[id number]]  (Normal) Collected: 08/11/23 1737    Specimen: Blood Updated: 08/11/23 1801     Glucose 104 mg/dL      Comment: : [id number] BRYANT ARCESEAMeter ID: [id number]       Blood Culture - Blood, Arm, Right [[id number]] Collected: 08/11/23 1241    Specimen: Blood from Arm, Right Updated: 08/11/23 1309    Magnesium [[id number]]  (Normal) Collected: 08/11/23 1139    Specimen: Blood Updated: 08/11/23 1225     Magnesium 2.3 mg/dL     Basic Metabolic Panel [[id number]]  (Abnormal) Collected: 08/11/23 1139    Specimen: Blood Updated: 08/11/23 1225     Glucose 111 mg/dL      BUN 20 mg/dL      Creatinine 1.34 mg/dL      Sodium 141 mmol/L      Potassium 3.9 mmol/L      Comment: Slight hemolysis detected by analyzer. Results may be affected.        Chloride 102 mmol/L      CO2 24.0 mmol/L      Calcium 8.8 mg/dL      BUN/Creatinine Ratio  14.9     Anion Gap 15.0 mmol/L      eGFR 39.7 mL/min/1.73     Narrative:      GFR Normal >60  Chronic Kidney Disease <60  Kidney Failure <15    The GFR formula is only valid for adults with stable renal function between ages 18 and 70.    TSH [[id number]]  (Normal) Collected: 08/11/23 1139    Specimen: Blood Updated: 08/11/23 1216     TSH 3.500 uIU/mL     T4, Free [[id number]]  (Normal) Collected: 08/11/23 1139    Specimen: Blood Updated: 08/11/23 1216     Free T4 1.36 ng/dL     Narrative:      Results may be falsely increased if patient taking Biotin.      Hemoglobin A1c [[id number]]  (Abnormal) Collected: 08/11/23 1139    Specimen: Blood Updated: 08/11/23 1202     Hemoglobin A1C 7.90 %     Narrative:      Hemoglobin A1C Ranges:    Increased Risk for Diabetes  5.7% to 6.4%  Diabetes                     >= 6.5%  Diabetic Goal                < 7.0%    CBC (No Diff) [[id number]]  (Abnormal) Collected: 08/11/23 1139    Specimen: Blood Updated: 08/11/23 1155     WBC 8.35 10*3/mm3      RBC 3.83 10*6/mm3      Hemoglobin 11.4 g/dL      Hematocrit 34.9 %      MCV 91.1 fL      MCH 29.8 pg      MCHC 32.7 g/dL      RDW 14.6 %      RDW-SD 47.5 fl      MPV 8.9 fL      Platelets 228 10*3/mm3               I reviewed the patient's new clinical results.  I reviewed the patient's new imaging results and agree with the interpretation.  I reviewed the patient's other test results and agree with the interpretation        Assessment:    Right UPJ stone with hydro-    Plan:     Cystoscopy retrograde and J stent once medically cleared      Brenden Clark MD  08/12/23  07:40 CDT   LOS: 1 day     Patient Care Team:  Sean Briones MD as PCP - General (Family Medicine)                                              Results Review:       Imaging Results (Last 24 Hours)       Procedure Component Value Units Date/Time    US Guided Vascular Access [[id number]] Collected: 08/11/23 1413     Updated: 08/11/23 1630    Narrative:      Targeted  grayscale ultrasound with compression of the right basilic vein  demonstrates patency.    IR Insert Midline Without Port Pump 5 Plus [[id number]] Resulted: 08/11/23 1335     Updated: 08/11/23 1335    Narrative:      This procedure was auto-finalized with no dictation required.          Lab Results (last 24 hours)       Procedure Component Value Units Date/Time    Comprehensive Metabolic Panel [[id number]]  (Abnormal) Collected: 08/12/23 0526    Specimen: Blood Updated: 08/12/23 0603     Glucose 115 mg/dL      BUN 18 mg/dL      Creatinine 1.46 mg/dL      Sodium 139 mmol/L      Potassium 4.1 mmol/L      Comment: Slight hemolysis detected by analyzer. Results may be affected.        Chloride 102 mmol/L      CO2 25.0 mmol/L      Calcium 8.6 mg/dL      Total Protein 7.7 g/dL      Albumin 3.1 g/dL      ALT (SGPT) 14 U/L      AST (SGOT) 26 U/L      Comment: Slight hemolysis detected by analyzer. Results may be affected.        Alkaline Phosphatase 154 U/L      Total Bilirubin 0.2 mg/dL      Globulin 4.6 gm/dL      A/G Ratio 0.7 g/dL      BUN/Creatinine Ratio 12.3     Anion Gap 12.0 mmol/L      eGFR 35.8 mL/min/1.73     Narrative:      GFR Normal >60  Chronic Kidney Disease <60  Kidney Failure <15    The GFR formula is only valid for adults with stable renal function between ages 18 and 70.    CBC (No Diff) [[id number]]  (Abnormal) Collected: 08/12/23 0526    Specimen: Blood Updated: 08/12/23 0543     WBC 9.05 10*3/mm3      RBC 3.81 10*6/mm3      Hemoglobin 11.0 g/dL      Hematocrit 35.7 %      MCV 93.7 fL      MCH 28.9 pg      MCHC 30.8 g/dL      RDW 14.5 %      RDW-SD 48.9 fl      MPV 9.1 fL      Platelets 263 10*3/mm3     POC Glucose Once [[id number]]  (Abnormal) Collected: 08/11/23 2123    Specimen: Blood Updated: 08/11/23 2140     Glucose 143 mg/dL      Comment: RN NotifiedOperator: [id number] LEOBARDOWright Memorial HospitalFRANCESCA [facility] [place] ID: [id number]       CRE Screen by PCR - Swab, Large Intestine, Rectum [[id number]] Collected: 08/11/23 1712     Specimen: Swab from Large Intestine, Rectum Updated: 08/11/23 1844     CRE SCREEN Not Detected     Comment: Test performed by real-time polymerase chain reaction (qPCR).        OXA 48 Strain Not Detected     IMP STRAIN Not Detected     VIM STRAIN Not Detected     NDM Strain Not Detected     KPC Strain Not Detected    POC Glucose Once [[id number]]  (Normal) Collected: 08/11/23 1737    Specimen: Blood Updated: 08/11/23 1801     Glucose 104 mg/dL      Comment: : [id number] BRYANT SUBRAMANIANAMeter ID: [id number]       Blood Culture - Blood, Arm, Right [[id number]] Collected: 08/11/23 1241    Specimen: Blood from Arm, Right Updated: 08/11/23 1309    Magnesium [[id number]]  (Normal) Collected: 08/11/23 1139    Specimen: Blood Updated: 08/11/23 1225     Magnesium 2.3 mg/dL     Basic Metabolic Panel [[id number]]  (Abnormal) Collected: 08/11/23 1139    Specimen: Blood Updated: 08/11/23 1225     Glucose 111 mg/dL      BUN 20 mg/dL      Creatinine 1.34 mg/dL      Sodium 141 mmol/L      Potassium 3.9 mmol/L      Comment: Slight hemolysis detected by analyzer. Results may be affected.        Chloride 102 mmol/L      CO2 24.0 mmol/L      Calcium 8.8 mg/dL      BUN/Creatinine Ratio 14.9     Anion Gap 15.0 mmol/L      eGFR 39.7 mL/min/1.73     Narrative:      GFR Normal >60  Chronic Kidney Disease <60  Kidney Failure <15    The GFR formula is only valid for adults with stable renal function between ages 18 and 70.    TSH [[id number]]  (Normal) Collected: 08/11/23 1139    Specimen: Blood Updated: 08/11/23 1216     TSH 3.500 uIU/mL     T4, Free [[id number]]  (Normal) Collected: 08/11/23 1139    Specimen: Blood Updated: 08/11/23 1216     Free T4 1.36 ng/dL     Narrative:      Results may be falsely increased if patient taking Biotin.      Hemoglobin A1c [[id number]]  (Abnormal) Collected: 08/11/23 1139    Specimen: Blood Updated: 08/11/23 1202     Hemoglobin A1C 7.90 %     Narrative:      Hemoglobin A1C Ranges:    Increased Risk  for Diabetes  5.7% to 6.4%  Diabetes                     >= 6.5%  Diabetic Goal                < 7.0%    CBC (No Diff) [[id number]]  (Abnormal) Collected: 08/11/23 1139    Specimen: Blood Updated: 08/11/23 1155     WBC 8.35 10*3/mm3      RBC 3.83 10*6/mm3      Hemoglobin 11.4 g/dL      Hematocrit 34.9 %      MCV 91.1 fL      MCH 29.8 pg      MCHC 32.7 g/dL      RDW 14.6 %      RDW-SD 47.5 fl      MPV 8.9 fL      Platelets 228 10*3/mm3               I reviewed the patient's new clinical results.  I reviewed the patient's new imaging results and agree with the interpretation.  I reviewed the patient's other test results and agree with the interpretation        Assessment:    Right UPJ stone with hydronephrosis    Plan:     Cystoscopy retrograde double-J stent when medically clear      Brenden Clark MD  08/12/23  07:40 CDT  Progress

## 2023-08-12 NOTE — PROGRESS NOTES
[facility] Medicine Services  INPATIENT PROGRESS NOTE      Length of Stay: 1  Date of Admission: 8/10/2023  Primary Care Physician: Sean Briones MD    Subjective   Chief Complaint:   No complaints on day of exam  HPI:  Patient is a 82-year-old obese female with known past medical history of hospitalization in September 2022 for STEMI with intervention to LAD and RCA, as history of diabetes mellitus type 2 chronic kidney disease anemia hypertension CVA, resident of nursing facility was sent to ED per ED record concerning possible kidney stone not further specified.  Patient denied any particular complaint in ED.  She had a abnormal CT of the abdomen and pelvis with large cyst.  Patient was found to be in A-fib with RVR.  Hospitalist service was called for observation of the patient.  I discussed with ED attending Dr. Golden.     I have seen and examined patient in ED room 11.  Patient resting in bed on room air denies any particular complaint.  On questioning why she is in the ER she states I do not know.  On questioning patient denies any fall injury trauma fever chills headache chest pain shortness of breath back pain abdominal pain URI UTI-like symptoms, constipation diarrhea nausea vomiting, bleeding in particular.    Daily assessment 8/12/2023  Seen and examined.  Remains in atrial flutter.  Cardizem up to 10 mg.  On Coreg 12.5 mg twice a day.  Rate currently stable at 108.  Blood pressure is 166/74 O2 saturation 97% on room air.  Patient denies any complaints.  No chest pain headache or dizziness.  No nausea vomiting fevers or chills.  Seen by urology today.  Cystoscopy to be completed when medically cleared.          Review of Systems   Constitutional:  Positive for fatigue. Negative for chills and fever.   HENT: Negative.  Negative for congestion.    Eyes: Negative.    Respiratory: Negative.  Negative for cough and shortness of breath.    Cardiovascular:  Negative.  Negative for chest pain and palpitations.   Gastrointestinal:  Positive for abdominal pain, nausea and vomiting. Negative for abdominal distention.   Endocrine: Negative.    Genitourinary: Negative.    Musculoskeletal: Negative.    Skin: Negative.    Neurological:  Positive for weakness. Negative for dizziness.   Hematological: Negative.    Psychiatric/Behavioral: Negative.        All pertinent negatives and positives are as above. All other systems have been reviewed and are negative unless otherwise stated.     Objective    As of today 08/12/23  Temp:  [97.4 øF (36.3 øC)-98.1 øF (36.7 øC)] 98.1 øF (36.7 øC)  Heart Rate:  [] 108  Resp:  [14-22] 22  BP: (101-201)/() 166/74    Physical Exam  Vitals and nursing note reviewed.   HENT:      Head: Normocephalic and atraumatic.      Right Ear: External ear normal.      Left Ear: External ear normal.      Nose: Nose normal.      Mouth/Throat:      Mouth: Mucous membranes are moist.      Pharynx: Oropharynx is clear.   Eyes:      General: No scleral icterus.     Extraocular Movements: Extraocular movements intact.      Conjunctiva/sclera: Conjunctivae normal.      Pupils: Pupils are equal, round, and reactive to light.   Cardiovascular:      Rate and Rhythm: Tachycardia present. Rhythm irregular.      Heart sounds: No murmur heard.  Pulmonary:      Effort: Pulmonary effort is normal.      Breath sounds: Normal breath sounds.   Abdominal:      General: Bowel sounds are normal.      Palpations: Abdomen is soft.      Tenderness: There is abdominal tenderness.   Musculoskeletal:         General: Normal range of motion.      Cervical back: Normal range of motion and neck supple.   Skin:     General: Skin is warm and dry.      Capillary Refill: Capillary refill takes less than 2 seconds.   Neurological:      General: No focal deficit present.      Mental Status: She is alert and oriented to person, place, and time.      Motor: Weakness present.    Psychiatric:         Mood and Affect: Mood normal.         Behavior: Behavior normal.         aspirin, 81 mg, Oral, Daily  carvedilol, 12.5 mg, Oral, BID With Meals  cefTRIAXone, 2,000 mg, Intravenous, Q24H  heparin (porcine), 5,000 Units, Subcutaneous, Q12H  lisinopril, 40 mg, Oral, Daily  rosuvastatin, 20 mg, Oral, Nightly  senna-docusate sodium, 2 tablet, Oral, BID  sodium chloride, 10 mL, Intravenous, Q12H  ticagrelor, 90 mg, Oral, BID         Results Review:  I have reviewed the labs, radiology results, and diagnostic studies.    Laboratory Data:   Results from last 7 days   Lab Units 08/12/23  0526 08/11/23  1139 08/10/23  1722   SODIUM mmol/L 139 141 139   POTASSIUM mmol/L 4.1 3.9 4.5   CHLORIDE mmol/L 102 102 102   CO2 mmol/L 25.0 24.0 23.0   BUN mg/dL 18 20 20   CREATININE mg/dL 1.46* 1.34* 1.43*   GLUCOSE mg/dL 115* 111* 109*   CALCIUM mg/dL 8.6 8.8 8.7   BILIRUBIN mg/dL 0.2  --  0.2   ALK PHOS U/L 154*  --  168*   ALT (SGPT) U/L 14  --  13   AST (SGOT) U/L 26  --  25   ANION GAP mmol/L 12.0 15.0 14.0       Estimated Creatinine Clearance: 30.5 mL/min (A) (by C-G formula based on SCr of 1.46 mg/dL (H)).  Results from last 7 days   Lab Units 08/11/23  1139   MAGNESIUM mg/dL 2.3           Results from last 7 days   Lab Units 08/12/23  0526 08/11/23  1139 08/10/23  1722   WBC 10*3/mm3 9.05 8.35 10.98*   HEMOGLOBIN g/dL 11.0* 11.4* 11.8*   HEMATOCRIT % 35.7 34.9 35.9   PLATELETS 10*3/mm3 263 228 244             Culture Data:   Blood Culture   Date Value Ref Range Status   08/11/2023 No growth at 24 hours  Preliminary     No results found for: URINECX  No results found for: RESPCX  No results found for: WOUNDCX  No results found for: STOOLCX  No components found for: BODYFLD    Radiology Data:   Imaging Results (Last 24 Hours)       Procedure Component Value Units Date/Time    US Guided Vascular Access [[id number]] Collected: 08/11/23 1413     Updated: 08/11/23 1630    Narrative:      Targeted grayscale  ultrasound with compression of the right basilic vein  demonstrates patency.            I have reviewed the patient's current medications.     Assessment/Plan     Principal Problem:    Atrial fibrillation with rapid ventricular response  Active Problems:    Atrial fibrillation with RVR    A-fib    Impression/plan  A-fib with RVR  Cardiac monitoring  Cardizem drip  Check echocardiogram  Completed 8/11/2023    Left ventricular systolic function is moderately decreased. Left ventricular ejection fraction appears to be 36 - 40%.    Left ventricular wall thickness is consistent with mild concentric hypertrophy.    The following left ventricular wall segments are hypokinetic: mid anterior, apical anterior, apical lateral and mid inferolateral.    Left ventricular diastolic function was indeterminate.    The left atrial cavity is mildly dilated.    Estimated right ventricular systolic pressure from tricuspid regurgitation is normal (<35 mmHg).  TSH 3.5      Right hydronephrosis secondary to pelvic mass  Urology consult  Scheduled for cystoscopy when medically cleared.    Chronic kidney disease stage IIIb  BUN is 18 creatinine 1.46 and GFR is 35.8  Continue to monitor kidney function    Coronary artery disease with history of stent 2022    Diabetes mellitus type 2  Sliding scale insulin  A1c seven-point    ADA cardiac diet    Hypertension  Current blood pressure 131/67  O2 saturation 96%    Chronic anemia  H&H is 11 and 35.7.    Obesity with BMI of 34  Continue nutritional support    CODE STATUS full code  DVT prophylaxis heparin    Medical Decision Making  Number and Complexity of problems: Acute medical problems as above.  Complex decision making.  Differential Diagnosis: Entertained considered and reflected in orders.     Conditions and Status:        Condition is worsening.     [facility] Data  External documents reviewed:   My EKG interpretation: Atrial fibrillation with rapid ventricular response and ST-T abnormalities  My  CT interpretation: Large cystic lesion with hydronephrosis  Tests considered but not ordered:      Decision rules/scores evaluated (example [id number], Wells, etc):      Discussed with: ED attending Dr. Nicholas and patient.   I have utilized all available immediate resources to obtain, update, or review the patient's current medications (including all prescriptions, over-the-counter products, herbals, cannabis/cannabidiol products, and vitamin/mineral/dietary (nutritional) supplements).             Care Planning  Shared decision making: ED attending Dr. Dowling and patient.  Code status and discussions: Patient decided for full code.  Patient decided that his son Darshan be his healthcare proxy     Disposition  Social Determinants of Health that impact treatment or disposition:   I expect the patient to be discharged to nursing home in 2 to days.             I confirmed that the patient's Advance Care Plan is present, code status is documented, or surrogate decision maker is listed in the patient's medical record.      I have utilized all available immediate resources to obtain, update, or review the patient's current medications.      I discussed the patient's findings and my recommendations with: Patient and she agreed with above plan of care.    Reid Escobar, DO

## 2023-08-12 NOTE — PLAN OF CARE
Goal Outcome Evaluation:                   Pt intermittently confused. No co pain today. Heart rate cont elevated 120s. Cardiazem at 15mg and po cardiazem started.

## 2023-08-13 PROBLEM — I48.91 ATRIAL FIBRILLATION: Status: ACTIVE | Noted: 2023-08-13

## 2023-08-13 LAB
ALBUMIN SERPL-MCNC: 3.3 G/DL (ref 3.5–5.2)
ALBUMIN/GLOB SERPL: 0.8 G/DL
ALP SERPL-CCNC: 146 U/L (ref 39–117)
ALT SERPL W P-5'-P-CCNC: 14 U/L (ref 1–33)
ANION GAP SERPL CALCULATED.3IONS-SCNC: 13 MMOL/L (ref 5–15)
AST SERPL-CCNC: 24 U/L (ref 1–32)
BILIRUB SERPL-MCNC: 0.2 MG/DL (ref 0–1.2)
BUN SERPL-MCNC: 16 MG/DL (ref 8–23)
BUN/CREAT SERPL: 12 (ref 7–25)
CALCIUM SPEC-SCNC: 8.7 MG/DL (ref 8.6–10.5)
CHLORIDE SERPL-SCNC: 103 MMOL/L (ref 98–107)
CO2 SERPL-SCNC: 24 MMOL/L (ref 22–29)
CREAT SERPL-MCNC: 1.33 MG/DL (ref 0.57–1)
DEPRECATED RDW RBC AUTO: 47.8 FL (ref 37–54)
EGFRCR SERPLBLD CKD-EPI 2021: 40 ML/MIN/1.73
ERYTHROCYTE [DISTWIDTH] IN BLOOD BY AUTOMATED COUNT: 14.5 % (ref 12.3–15.4)
GLOBULIN UR ELPH-MCNC: 4.1 GM/DL
GLUCOSE BLDC GLUCOMTR-MCNC: 138 MG/DL (ref 70–130)
GLUCOSE BLDC GLUCOMTR-MCNC: 168 MG/DL (ref 70–130)
GLUCOSE SERPL-MCNC: 120 MG/DL (ref 65–99)
HCT VFR BLD AUTO: 33 % (ref 34–46.6)
HGB BLD-MCNC: 10.8 G/DL (ref 12–15.9)
MCH RBC QN AUTO: 29.9 PG (ref 26.6–33)
MCHC RBC AUTO-ENTMCNC: 32.7 G/DL (ref 31.5–35.7)
MCV RBC AUTO: 91.4 FL (ref 79–97)
PLATELET # BLD AUTO: 240 10*3/MM3 (ref 140–450)
PMV BLD AUTO: 8.9 FL (ref 6–12)
POTASSIUM SERPL-SCNC: 3.6 MMOL/L (ref 3.5–5.2)
PROT SERPL-MCNC: 7.4 G/DL (ref 6–8.5)
RBC # BLD AUTO: 3.61 10*6/MM3 (ref 3.77–5.28)
SODIUM SERPL-SCNC: 140 MMOL/L (ref 136–145)
WBC NRBC COR # BLD: 7.44 10*3/MM3 (ref 3.4–10.8)

## 2023-08-13 PROCEDURE — 97162 PT EVAL MOD COMPLEX 30 MIN: CPT

## 2023-08-13 PROCEDURE — 25010000002 HEPARIN (PORCINE) PER 1000 UNITS: Performed by: INTERNAL MEDICINE

## 2023-08-13 PROCEDURE — 82948 REAGENT STRIP/BLOOD GLUCOSE: CPT

## 2023-08-13 PROCEDURE — 25010000002 CEFTRIAXONE PER 250 MG: Performed by: INTERNAL MEDICINE

## 2023-08-13 PROCEDURE — 80053 COMPREHEN METABOLIC PANEL: CPT | Performed by: INTERNAL MEDICINE

## 2023-08-13 PROCEDURE — 85027 COMPLETE CBC AUTOMATED: CPT | Performed by: INTERNAL MEDICINE

## 2023-08-13 RX ADMIN — DILTIAZEM HYDROCHLORIDE 60 MG: 60 TABLET, FILM COATED ORAL at 06:46

## 2023-08-13 RX ADMIN — LISINOPRIL 40 MG: 40 TABLET ORAL at 08:30

## 2023-08-13 RX ADMIN — ROSUVASTATIN CALCIUM 20 MG: 10 TABLET, FILM COATED ORAL at 20:27

## 2023-08-13 RX ADMIN — DOCUSATE SODIUM 50 MG AND SENNOSIDES 8.6 MG 2 TABLET: 8.6; 5 TABLET, FILM COATED ORAL at 08:29

## 2023-08-13 RX ADMIN — ASPIRIN 81 MG: 81 TABLET, COATED ORAL at 08:29

## 2023-08-13 RX ADMIN — HEPARIN SODIUM 5000 UNITS: 5000 INJECTION INTRAVENOUS; SUBCUTANEOUS at 08:29

## 2023-08-13 RX ADMIN — HEPARIN SODIUM 5000 UNITS: 5000 INJECTION INTRAVENOUS; SUBCUTANEOUS at 20:30

## 2023-08-13 RX ADMIN — CEFTRIAXONE SODIUM 2000 MG: 2 INJECTION, POWDER, FOR SOLUTION INTRAMUSCULAR; INTRAVENOUS at 14:15

## 2023-08-13 RX ADMIN — Medication 10 ML: at 20:31

## 2023-08-13 RX ADMIN — DILTIAZEM HYDROCHLORIDE 60 MG: 60 TABLET, FILM COATED ORAL at 14:15

## 2023-08-13 RX ADMIN — CARVEDILOL 12.5 MG: 12.5 TABLET, FILM COATED ORAL at 18:02

## 2023-08-13 RX ADMIN — TICAGRELOR 90 MG: 90 TABLET ORAL at 08:30

## 2023-08-13 RX ADMIN — Medication 10 ML: at 08:30

## 2023-08-13 RX ADMIN — DILTIAZEM HYDROCHLORIDE 60 MG: 60 TABLET, FILM COATED ORAL at 20:30

## 2023-08-13 RX ADMIN — TICAGRELOR 90 MG: 90 TABLET ORAL at 20:27

## 2023-08-13 RX ADMIN — CARVEDILOL 12.5 MG: 12.5 TABLET, FILM COATED ORAL at 08:29

## 2023-08-13 NOTE — PROGRESS NOTES
"   LOS: 1 day   Patient Care Team:  Sean Briones MD as PCP - General (Family Medicine)    Subjective     Subjective:  Symptoms:  Stable.      History taken from: patient chart    Objective     Vital Signs  Temp:  [98.2 øF (36.8 øC)-98.5 øF (36.9 øC)] 98.2 øF (36.8 øC)  Heart Rate:  [] 107  Resp:  [16-22] 16  BP: (101-173)/() 146/77    Objective:  General Appearance:  Not in pain.    Vital signs: (most recent): Blood pressure 146/77, pulse 107, temperature 98.2 øF (36.8 øC), temperature source Oral, resp. rate 16, height 157.5 cm (62.01\"), weight 87.5 kg (192 lb 14.4 oz), SpO2 95 %.  No fever.    Output: Producing urine.    Lungs:  Normal effort and normal respiratory rate.    Abdomen: Abdomen is soft and non-distended.  There is no abdominal tenderness.     Neurological: Patient is alert.  (Confused to situation).    Skin:  Warm, dry and pale.              Results Review:    Lab Results (last 24 hours)       Procedure Component Value Units Date/Time    CBC (No Diff) [[id number]]  (Abnormal) Collected: 08/13/23 0734    Specimen: Blood Updated: 08/13/23 0750     WBC 7.44 10*3/mm3      RBC 3.61 10*6/mm3      Hemoglobin 10.8 g/dL      Hematocrit 33.0 %      MCV 91.4 fL      MCH 29.9 pg      MCHC 32.7 g/dL      RDW 14.5 %      RDW-SD 47.8 fl      MPV 8.9 fL      Platelets 240 10*3/mm3     Comprehensive Metabolic Panel [[id number]] Collected: 08/13/23 0734    Specimen: Blood Updated: 08/13/23 0746    POC Glucose Once [[id number]]  (Abnormal) Collected: 08/12/23 2201    Specimen: Blood Updated: 08/12/23 2222     Glucose 139 mg/dL      Comment: RN NotifiedOperator: [id number] LEOBARDORanken Jordan Pediatric Specialty HospitalFRANCESCA [facility] HealthYMeter ID: [id number]       POC Glucose Once [[id number]]  (Abnormal) Collected: 08/12/23 1122    Specimen: Blood Updated: 08/12/23 1719     Glucose 164 mg/dL      Comment: : [id number] PATTY VANMeter ID: [id number]       Urine Culture - Urine, Urine, Clean Catch [[id number]] Collected: 08/10/23 2040    " Specimen: Urine, Clean Catch Updated: 08/12/23 1515     Urine Culture 50,000 CFU/mL Mixed Daron Isolated    Narrative:      Specimen contains mixed organisms of questionable pathogenicity suggestive of contamination. If symptoms persist, suggest recollection.  Colonization of the urinary tract without infection is common. Treatment is discouraged unless the patient is symptomatic, pregnant, or undergoing an invasive urologic procedure.    Blood Culture - Blood, Arm, Right [[id number]]  (Normal) Collected: 08/11/23 1241    Specimen: Blood from Arm, Right Updated: 08/12/23 1315     Blood Culture No growth at 24 hours           Imaging Results (Last 24 Hours)       ** No results found for the last 24 hours. **             I reviewed the patient's new clinical results.  I reviewed the patient's new imaging results and agree with the interpretation.  I reviewed the patient's other test results and agree with the interpretation      Assessment & Plan       Atrial fibrillation with rapid ventricular response    Atrial fibrillation with RVR    A-fib      Assessment & Plan    Consulted to Urology for RIGHT UPJ stone, hydronephrosis, mixed daron on urine culture.   Estimated Creatinine Clearance: 30.5 mL/min (A) (by C-G formula based on SCr of 1.46 mg/dL (H)).    Plan:  RIGHT CRULLS when medically improved    MIKA Berg  08/13/23  08:00 CDT

## 2023-08-13 NOTE — PLAN OF CARE
Goal Outcome Evaluation:  Plan of Care Reviewed With: patient           Outcome Evaluation: Initial PT evaluation complete.  Patent is alert and very cooperative.  She requires SPV with bed mobility, CGA with transfers and gait, ambulating 25'x1 with FWW, slow marimar, good use of walker, fatigues quickly.  Patient is appropriate for return to SNF upon discharge.  Goals established, continue skilled I/P PT.      Anticipated Discharge Disposition (PT): skilled nursing facility

## 2023-08-13 NOTE — THERAPY EVALUATION
Patient Name: Magy Barton  : 1940    MRN: [id number]                              Today's Date: 2023       Admit Date: 8/10/2023    Visit Dx:     ICD-10-CM ICD-9-CM   1. Atrial fibrillation with rapid ventricular response  I48.91 427.31   2. Acquired renal cyst of right kidney  N28.1 593.2   3. UPJ obstruction, acquired  N13.5 593.4   4. Impaired functional mobility, balance, gait, and endurance [Z74.09 (ICD-10-CM)]  Z74.09 V49.89     Patient Active Problem List   Diagnosis    Urinary tract infection with hematuria    Colitis    Non-STEMI (non-ST elevated myocardial infarction)    Atrial fibrillation with rapid ventricular response    Atrial fibrillation with RVR    A-fib    Atrial fibrillation     Past Medical History:   Diagnosis Date    Cancer     leukemia    Coronary artery disease     Diabetes mellitus     Hypertension     Injury of back     Myocardial infarction     Stroke      Past Surgical History:   Procedure Laterality Date    CARDIAC CATHETERIZATION Left 2022    Procedure: Left Heart Cath;  Surgeon: Ayaz Wild DO;  Location: [facility] CATH INVASIVE LOCATION;  Service: Cardiology;  Laterality: Left;    CARDIAC CATHETERIZATION N/A 2022    Procedure: PERCUTANEOUS CORONARY INTERVENTION;  Surgeon: Ayaz Wild DO;  Location: [facility] CATH INVASIVE LOCATION;  Service: Cardiology;  Laterality: N/A;    CATARACT EXTRACTION      TRIGEMINAL NERVE DECOMPRESSION      TUBAL ABDOMINAL LIGATION      UVULOPALATOPHARYNGOPLASTY        General Information       Row Name 23 1450          Physical Therapy Time and Intention    Document Type evaluation  -       Row Name 23 1450          General Information    Patient Profile Reviewed yes  -CZ     Prior Level of Function independent:;all household mobility  -CZ       Row Name 23 1450          Living Environment    People in Home facility resident  -CZ       Row Name 23 1450          Home Main Entrance    Number of Stairs,  Main Entrance none  -CZ       Row Name 08/13/23 1450          Stairs Within Home, Primary    Stairs, Within Home, Primary SNF for rehab per patient.  Ambulates with FWW, reports she amblates without assistance.  -CZ     Number of Stairs, Within Home, Primary none  -CZ       Row Name 08/13/23 1450          Cognition    Orientation Status (Cognition) oriented x 4;verbal cues/prompts needed for orientation  -CZ       Row Name 08/13/23 1450          Safety Issues, Functional Mobility    Impairments Affecting Function (Mobility) strength;endurance/activity tolerance;balance  -CZ               User Key  (r) = Recorded By, (t) = Taken By, (c) = Cosigned By      Initials Name Provider Type    CZ Zhang Jean, PT Physical Therapist                   Mobility       Row Name 08/13/23 1450          Bed Mobility    Bed Mobility supine-sit;sit-supine;scooting/bridging  -CZ     Scooting/Bridging [place] (Bed Mobility) supervision  -CZ     Supine-Sit [place] (Bed Mobility) supervision  -CZ     Sit-Supine [place] (Bed Mobility) supervision  -CZ     Assistive Device (Bed Mobility) head of bed elevated;bed rails  -CZ       Row Name 08/13/23 1450          Sit-Stand Transfer    Sit-Stand [place] (Transfers) contact guard  -CZ     Assistive Device (Sit-Stand Transfers) walker, front-wheeled  -CZ       Row Name 08/13/23 1450          Gait/Stairs (Locomotion)    [place] Level (Gait) contact guard  -CZ     Assistive Device (Gait) walker, front-wheeled  -CZ     Distance in Feet (Gait) 25'x1.  -CZ               User Key  (r) = Recorded By, (t) = Taken By, (c) = Cosigned By      Initials Name Provider Type    CZ Zhang Jean, PT Physical Therapist                   Obj/Interventions       Row Name 08/13/23 1450          Range of Motion Comprehensive    General Range of Motion bilateral lower extremity ROM WFL  -CZ       Row Name 08/13/23 1450          Strength Comprehensive (MMT)    Comment, General Manual  Muscle Testing (MMT) Assessment LLE: 3+/5, RLE: 4-/5 grossly.  -CZ       Row Name 08/13/23 1450          Sensory Assessment (Somatosensory)    Sensory Assessment (Somatosensory) LE sensation intact  -CZ               User Key  (r) = Recorded By, (t) = Taken By, (c) = Cosigned By      Initials Name Provider Type    CZ Zhang Jean, PT Physical Therapist                   Goals/Plan       Row Name 08/13/23 1450          Bed Mobility Goal 1 (PT)    Activity/Assistive Device (Bed Mobility Goal 1, PT) sit to supine/supine to sit  -CZ     [place] Level/Cues Needed (Bed Mobility Goal 1, PT) modified independence  -CZ     Time Frame (Bed Mobility Goal 1, PT) by discharge  -CZ     Strategies/Barriers (Bed Mobility Goal 1, PT) HOB elevated as necessary.  -CZ     Progress/Outcomes (Bed Mobility Goal 1, PT) goal not met  -CZ       Row Name 08/13/23 1450          Transfer Goal 1 (PT)    Activity/Assistive Device (Transfer Goal 1, PT) sit-to-stand/stand-to-sit;bed-to-chair/chair-to-bed;walker, rolling  -CZ     [place] Level/Cues Needed (Transfer Goal 1, PT) modified independence  -CZ     Time Frame (Transfer Goal 1, PT) by discharge  -CZ     Progress/Outcome (Transfer Goal 1, PT) goal not met  -CZ       Row Name 08/13/23 1450          Gait Training Goal 1 (PT)    Activity/Assistive Device (Gait Training Goal 1, PT) walker, rolling  -CZ     [place] Level (Gait Training Goal 1, PT) modified independence  -CZ     Distance (Gait Training Goal 1, PT) 150' x 1.  -CZ     Time Frame (Gait Training Goal 1, PT) by discharge  -CZ     Strategies/Barriers (Gait Training Goal 1, PT) Fatigued easily during evaluation.  -CZ     Progress/Outcome (Gait Training Goal 1, PT) goal not met  -CZ       Row Name 08/13/23 1450          Problem Specific Goal 1 (PT)    Problem Specific Goal 1 (PT) Score 25/28 on Tinetti fall risk assessment.  -CZ     Time Frame (Problem Specific Goal 1, PT) by discharge  -CZ     Progress/Outcome (Problem  Specific Goal 1, PT) goal not met  -       Row Name 08/13/23 1450          Therapy Assessment/Plan (PT)    Planned Therapy Interventions (PT) balance training;bed mobility training;gait training;patient/family education;transfer training;stretching;strengthening;ROM (range of motion)  -               User Key  (r) = Recorded By, (t) = Taken By, (c) = Cosigned By      Initials Name Provider Type    CZ Zhang Jean, PT Physical Therapist                   Clinical Impression       Row Name 08/13/23 1450          Pain    Pretreatment Pain Rating 0/10 - no pain  -CZ     Posttreatment Pain Rating 0/10 - no pain  -       Row Name 08/13/23 1450          Plan of Care Review    Plan of Care Reviewed With patient  -     Outcome Evaluation Initial PT evaluation complete.  Patent is alert and very cooperative.  She requires SPV with bed mobility, CGA with transfers and gait, ambulating 25'x1 with FWW, slow marimar, good use of walker, fatigues quickly.  Patient is appropriate for return to SNF upon discharge.  Goals established, continue skilled I/P PT.  -       Row Name 08/13/23 1450          Therapy Assessment/Plan (PT)    Rehab Potential (PT) good, to achieve stated therapy goals  -     Criteria for Skilled Interventions Met (PT) yes;skilled treatment is necessary  -     Therapy Frequency (PT) other (see comments)  5-7 days/week.  -       Row Name 08/13/23 1450          Vital Signs    Post Systolic BP Rehab 143  -CZ     Post Treatment Diastolic BP 94  -CZ     Pretreatment Heart Rate (beats/min) 80  -CZ     Posttreatment Heart Rate (beats/min) 80  -CZ     Pre SpO2 (%) 99  -CZ     O2 Delivery Pre Treatment room air  -CZ     Post SpO2 (%) 98  -CZ     O2 Delivery Post Treatment room air  -CZ     Pre Patient Position Supine  -CZ     Post Patient Position Supine  -CZ       Row Name 08/13/23 145          Positioning and Restraints    Pre-Treatment Position in bed  -CZ     Post Treatment Position bed  -CZ     In  Bed supine;call light within reach;encouraged to call for assist;exit alarm on;with nsg  -CZ               User Key  (r) = Recorded By, (t) = Taken By, (c) = Cosigned By      Initials Name Provider Type    Zhang Waite, PT Physical Therapist                   Outcome Measures       Row Name 08/13/23 1450 08/13/23 1127       How much help from another person do you currently need...    Turning from your back to your side while in flat bed without using bedrails? 3  -CZ 3  -JA    Moving from lying on back to sitting on the side of a flat bed without bedrails? 3  -CZ 3  -JA    Moving to and from a bed to a chair (including a wheelchair)? 3  -CZ 2  -JA    Standing up from a chair using your arms (e.g., wheelchair, bedside chair)? 3  -CZ 2  -JA    Climbing 3-5 steps with a railing? 2  -CZ 2  -JA    To walk in hospital room? 3  -CZ 2  -JA    AM-PAC 6 Clicks Score (PT) 17  -CZ 14  -JA    Highest level of mobility 5 --> Static standing  -CZ 4 --> Transferred to chair/commode  -JA      Row Name 08/13/23 1450          Functional Assessment    Outcome Measure Options AM-PAC 6 Clicks Basic Mobility (PT)  -               User Key  (r) = Recorded By, (t) = Taken By, (c) = Cosigned By      Initials Name Provider Type    Brian Thurston, RN Registered Nurse    Zhang Waite, PT Physical Therapist                                 Physical Therapy Education       Title: PT OT SLP Therapies (In Progress)       Topic: Physical Therapy (In Progress)       Point: Mobility training (In Progress)       Learning Progress Summary             Patient Acceptance, E, NR by SNEHAL at 8/13/2023 1530    Comment: PT POC, hand placement with transfers, posture with gait.                         Point: Home exercise program (Not Started)       Learner Progress:  Not documented in this visit.              Point: Body mechanics (Not Started)       Learner Progress:  Not documented in this visit.              Point: Precautions (Not  Started)       Learner Progress:  Not documented in this visit.                              User Key       Initials Effective Dates Name Provider Type Discipline    CZ 07/11/23 -  Zhang Jean, PT Physical Therapist PT                  PT Recommendation and Plan  Planned Therapy Interventions (PT): balance training, bed mobility training, gait training, patient/family education, transfer training, stretching, strengthening, ROM (range of motion)  Plan of Care Reviewed With: patient  Outcome Evaluation: Initial PT evaluation complete.  Patent is alert and very cooperative.  She requires SPV with bed mobility, CGA with transfers and gait, ambulating 25'x1 with FWW, slow marimar, good use of walker, fatigues quickly.  Patient is appropriate for return to SNF upon discharge.  Goals established, continue skilled I/P PT.     Time Calculation:   PT Evaluation Complexity  History, PT Evaluation Complexity: 1-2 personal factors and/or comorbidities  Examination of Body Systems (PT Eval Complexity): total of 3 or more elements  Clinical Presentation (PT Evaluation Complexity): evolving  Clinical Decision Making (PT Evaluation Complexity): moderate complexity  Overall Complexity (PT Evaluation Complexity): moderate complexity     PT Charges       Row Name 08/13/23 1533             Time Calculation    Start Time 1450  -CZ      Stop Time 1529  -CZ      Time Calculation (min) 39 min  -CZ      PT Received On 08/13/23  -CZ      PT Goal Re-Cert Due Date 08/26/23  -CZ         Untimed Charges    PT Eval/Re-eval Minutes 39  -CZ         Total Minutes    Untimed Charges Total Minutes 39  -CZ       Total Minutes 39  -CZ                User Key  (r) = Recorded By, (t) = Taken By, (c) = Cosigned By      Initials Name Provider Type    CZ Zhang Jean, PT Physical Therapist                  Therapy Charges for Today       Code Description Service Date Service Provider Modifiers Qty    21485796516 HC PT EVAL MOD COMPLEXITY 3 8/13/2023  Zhang Jean, PT GP 1            PT G-Codes  Outcome Measure Options: AM-PAC 6 Clicks Basic Mobility (PT)  AM-PAC 6 Clicks Score (PT): 17  PT Discharge Summary  Anticipated Discharge Disposition (PT): skilled nursing facility    Zhang Jean, PT  8/13/2023

## 2023-08-13 NOTE — PROGRESS NOTES
[facility] Medicine Services  INPATIENT PROGRESS NOTE      Length of Stay: 1  Date of Admission: 8/10/2023  Primary Care Physician: Sean Briones MD    Subjective   Chief Complaint:   No complaints on day of exam  HPI:  Patient is a 82-year-old obese female with known past medical history of hospitalization in September 2022 for STEMI with intervention to LAD and RCA, as history of diabetes mellitus type 2 chronic kidney disease anemia hypertension CVA, resident of nursing facility was sent to ED per ED record concerning possible kidney stone not further specified.  Patient denied any particular complaint in ED.  She had a abnormal CT of the abdomen and pelvis with large cyst.  Patient was found to be in A-fib with RVR.  Hospitalist service was called for observation of the patient.  I discussed with ED attending Dr. Golden.     I have seen and examined patient in ED room 11.  Patient resting in bed on room air denies any particular complaint.  On questioning why she is in the ER she states I do not know.  On questioning patient denies any fall injury trauma fever chills headache chest pain shortness of breath back pain abdominal pain URI UTI-like symptoms, constipation diarrhea nausea vomiting, bleeding in particular.    Daily assessment 8/13/2023  On evaluation in the morning the patient is doing much better remains in atrial fibrillation.  Now off Cardizem drip.  Heart rate is in the 90s.  Current blood pressure 147/76 O2 saturation currently stable.  Patient denies any new problems or complaints.  Continues to improve.  Has been seen by urology procedure will be completed when patient is stabilized which probably should be in the morning.      Review of Systems   Constitutional:  Positive for fatigue. Negative for chills and fever.   HENT: Negative.  Negative for congestion.    Eyes: Negative.    Respiratory:  Positive for shortness of breath. Negative for  cough.    Cardiovascular: Negative.  Negative for chest pain and palpitations.   Gastrointestinal:  Negative for abdominal distention.   Endocrine: Negative.    Genitourinary: Negative.    Musculoskeletal: Negative.    Skin: Negative.    Neurological:  Positive for weakness. Negative for dizziness.   Hematological: Negative.    Psychiatric/Behavioral: Negative.        All pertinent negatives and positives are as above. All other systems have been reviewed and are negative unless otherwise stated.     Objective    As of today 08/13/23  Temp:  [97.4 øF (36.3 øC)-98.6 øF (37 øC)] 97.4 øF (36.3 øC)  Heart Rate:  [] 81  Resp:  [16-22] 16  BP: (131-173)/() 151/98    Physical Exam  Vitals and nursing note reviewed.   HENT:      Head: Normocephalic and atraumatic.      Right Ear: External ear normal.      Left Ear: External ear normal.      Nose: Nose normal.      Mouth/Throat:      Mouth: Mucous membranes are moist.      Pharynx: Oropharynx is clear.   Eyes:      General: No scleral icterus.     Extraocular Movements: Extraocular movements intact.      Conjunctiva/sclera: Conjunctivae normal.      Pupils: Pupils are equal, round, and reactive to light.   Cardiovascular:      Rate and Rhythm: Tachycardia present. Rhythm irregular.      Heart sounds: No murmur heard.  Pulmonary:      Effort: Pulmonary effort is normal.      Breath sounds: Normal breath sounds.   Abdominal:      General: Bowel sounds are normal.      Palpations: Abdomen is soft.      Tenderness: There is abdominal tenderness.   Musculoskeletal:         General: Normal range of motion.      Cervical back: Normal range of motion and neck supple.   Skin:     General: Skin is warm and dry.      Capillary Refill: Capillary refill takes less than 2 seconds.   Neurological:      General: No focal deficit present.      Mental Status: She is alert and oriented to person, place, and time.      Motor: Weakness present.   Psychiatric:         Mood and Affect:  Mood normal.         Behavior: Behavior normal.         aspirin, 81 mg, Oral, Daily  carvedilol, 12.5 mg, Oral, BID With Meals  cefTRIAXone, 2,000 mg, Intravenous, Q24H  dilTIAZem, 60 mg, Oral, Q8H  heparin (porcine), 5,000 Units, Subcutaneous, Q12H  lisinopril, 40 mg, Oral, Daily  rosuvastatin, 20 mg, Oral, Nightly  senna-docusate sodium, 2 tablet, Oral, BID  sodium chloride, 10 mL, Intravenous, Q12H  ticagrelor, 90 mg, Oral, BID         Results Review:  I have reviewed the labs, radiology results, and diagnostic studies.    Laboratory Data:   Results from last 7 days   Lab Units 08/13/23  0734 08/12/23  0526 08/11/23  1139 08/10/23  1722   SODIUM mmol/L 140 139 141 139   POTASSIUM mmol/L 3.6 4.1 3.9 4.5   CHLORIDE mmol/L 103 102 102 102   CO2 mmol/L 24.0 25.0 24.0 23.0   BUN mg/dL 16 18 20 20   CREATININE mg/dL 1.33* 1.46* 1.34* 1.43*   GLUCOSE mg/dL 120* 115* 111* 109*   CALCIUM mg/dL 8.7 8.6 8.8 8.7   BILIRUBIN mg/dL 0.2 0.2  --  0.2   ALK PHOS U/L 146* 154*  --  168*   ALT (SGPT) U/L 14 14  --  13   AST (SGOT) U/L 24 26  --  25   ANION GAP mmol/L 13.0 12.0 15.0 14.0       Estimated Creatinine Clearance: 33.7 mL/min (A) (by C-G formula based on SCr of 1.33 mg/dL (H)).  Results from last 7 days   Lab Units 08/11/23  1139   MAGNESIUM mg/dL 2.3           Results from last 7 days   Lab Units 08/13/23  0734 08/12/23  0526 08/11/23  1139 08/10/23  1722   WBC 10*3/mm3 7.44 9.05 8.35 10.98*   HEMOGLOBIN g/dL 10.8* 11.0* 11.4* 11.8*   HEMATOCRIT % 33.0* 35.7 34.9 35.9   PLATELETS 10*3/mm3 240 263 228 244             Culture Data:   Blood Culture   Date Value Ref Range Status   08/11/2023 No growth at 24 hours  Preliminary     Urine Culture   Date Value Ref Range Status   08/10/2023 50,000 CFU/mL Mixed Tg Isolated  Final     No results found for: RESPCX  No results found for: WOUNDCX  No results found for: STOOLCX  No components found for: BODYFLD    Radiology Data:   Imaging Results (Last 24 Hours)       ** No results  found for the last 24 hours. **            I have reviewed the patient's current medications.     Assessment/Plan     Principal Problem:    Atrial fibrillation with rapid ventricular response  Active Problems:    Atrial fibrillation with RVR    A-fib    Impression/plan  A-fib with RVR improving.  Heart rate in the 90s.  Cardiac monitoring  Cardizem drip has been discontinued.  Check echocardiogram  Completed 8/11/2023    Left ventricular systolic function is moderately decreased. Left ventricular ejection fraction appears to be 36 - 40%.    Left ventricular wall thickness is consistent with mild concentric hypertrophy.    The following left ventricular wall segments are hypokinetic: mid anterior, apical anterior, apical lateral and mid inferolateral.    Left ventricular diastolic function was indeterminate.    The left atrial cavity is mildly dilated.    Estimated right ventricular systolic pressure from tricuspid regurgitation is normal (<35 mmHg).  TSH 3.5      Right hydronephrosis secondary to pelvic mass  Has been seen by neurology today and plan for procedure soon    Chronic kidney disease stage IIIb  BUN is 16 creatinine 1.33 GFR is now 40.  In proving.  Continue to monitor kidney function    Coronary artery disease with history of stent 2022    Diabetes mellitus type 2  Sliding scale insulin  A1c seven-point    ADA cardiac diet    Hypertension  Remained stable.    Chronic anemia  H&H is 10.8 and hematocrit is 33.  Remains stable we will continue to monitor    Obesity with BMI of 34  Continue nutritional support    CODE STATUS full code  DVT prophylaxis heparin    Medical Decision Making  Number and Complexity of problems: Acute medical problems as above.  Complex decision making.  Differential Diagnosis: Entertained considered and reflected in orders.     Conditions and Status:        Condition is worsening.     [facility] Data  External documents reviewed:   My EKG interpretation: Atrial fibrillation with rapid  ventricular response and ST-T abnormalities  My CT interpretation: Large cystic lesion with hydronephrosis  Tests considered but not ordered:      Decision rules/scores evaluated (example [id number], [place], etc):      Discussed with: ED attending Dr. Nicholas and patient.   I have utilized all available immediate resources to obtain, update, or review the patient's current medications (including all prescriptions, over-the-counter products, herbals, cannabis/cannabidiol products, and vitamin/mineral/dietary (nutritional) supplements).             Care Planning  Shared decision making: ED attending Dr. Dowling and patient.  Code status and discussions: Patient decided for full code.  Patient decided that his son Darshan be his healthcare proxy     Disposition  Social Determinants of Health that impact treatment or disposition:   I expect the patient to be discharged to nursing home in 2 to days.             I confirmed that the patient's Advance Care Plan is present, code status is documented, or surrogate decision maker is listed in the patient's medical record.      I have utilized all available immediate resources to obtain, update, or review the patient's current medications.      I discussed the patient's findings and my recommendations with: Patient and she agreed with above plan of care.    Reid Escobar, DO

## 2023-08-14 LAB
GLUCOSE BLDC GLUCOMTR-MCNC: 125 MG/DL (ref 70–130)
GLUCOSE BLDC GLUCOMTR-MCNC: 146 MG/DL (ref 70–130)
GLUCOSE BLDC GLUCOMTR-MCNC: 151 MG/DL (ref 70–130)
GLUCOSE BLDC GLUCOMTR-MCNC: 223 MG/DL (ref 70–130)

## 2023-08-14 PROCEDURE — 25010000002 HEPARIN (PORCINE) PER 1000 UNITS: Performed by: INTERNAL MEDICINE

## 2023-08-14 PROCEDURE — 25010000002 CEFTRIAXONE PER 250 MG: Performed by: INTERNAL MEDICINE

## 2023-08-14 PROCEDURE — 82948 REAGENT STRIP/BLOOD GLUCOSE: CPT

## 2023-08-14 PROCEDURE — 97530 THERAPEUTIC ACTIVITIES: CPT

## 2023-08-14 PROCEDURE — 97110 THERAPEUTIC EXERCISES: CPT

## 2023-08-14 RX ADMIN — DILTIAZEM HYDROCHLORIDE 60 MG: 60 TABLET, FILM COATED ORAL at 21:11

## 2023-08-14 RX ADMIN — DILTIAZEM HYDROCHLORIDE 60 MG: 60 TABLET, FILM COATED ORAL at 06:20

## 2023-08-14 RX ADMIN — TICAGRELOR 90 MG: 90 TABLET ORAL at 21:11

## 2023-08-14 RX ADMIN — Medication 10 ML: at 08:57

## 2023-08-14 RX ADMIN — HEPARIN SODIUM 5000 UNITS: 5000 INJECTION INTRAVENOUS; SUBCUTANEOUS at 21:11

## 2023-08-14 RX ADMIN — Medication 10 ML: at 21:12

## 2023-08-14 RX ADMIN — ROSUVASTATIN CALCIUM 20 MG: 10 TABLET, FILM COATED ORAL at 21:11

## 2023-08-14 RX ADMIN — DILTIAZEM HYDROCHLORIDE 60 MG: 60 TABLET, FILM COATED ORAL at 13:31

## 2023-08-14 RX ADMIN — CARVEDILOL 12.5 MG: 12.5 TABLET, FILM COATED ORAL at 18:03

## 2023-08-14 RX ADMIN — LISINOPRIL 40 MG: 40 TABLET ORAL at 08:57

## 2023-08-14 RX ADMIN — TICAGRELOR 90 MG: 90 TABLET ORAL at 08:57

## 2023-08-14 RX ADMIN — CARVEDILOL 12.5 MG: 12.5 TABLET, FILM COATED ORAL at 08:57

## 2023-08-14 RX ADMIN — HEPARIN SODIUM 5000 UNITS: 5000 INJECTION INTRAVENOUS; SUBCUTANEOUS at 08:57

## 2023-08-14 RX ADMIN — ASPIRIN 81 MG: 81 TABLET, COATED ORAL at 08:56

## 2023-08-14 RX ADMIN — CEFTRIAXONE SODIUM 2000 MG: 2 INJECTION, POWDER, FOR SOLUTION INTRAMUSCULAR; INTRAVENOUS at 13:31

## 2023-08-14 NOTE — PLAN OF CARE
Goal Outcome Evaluation:  Plan of Care Reviewed With: patient           Outcome Evaluation: pt resting in bed. assessed BP and HR with dynamap, HR elevated and BP appears elevated. checked BP manually and appears to be lower than on dynamap. nsg ok's bed exercises only secondary to elevated HR. pt is scooting in bed modified independently. completes LE therex with no difficulty although she fatigues. repositioned for comfort. all needs in reach.      Anticipated Discharge Disposition (PT): skilled nursing facility

## 2023-08-14 NOTE — THERAPY TREATMENT NOTE
Patient Name: Magy Barton  : 1940    MRN: [id number]                              Today's Date: 2023     Physical Therapy Treatment Note    Admit Date: 8/10/2023    Visit Dx:     ICD-10-CM ICD-9-CM   1. Atrial fibrillation with rapid ventricular response  I48.91 427.31   2. Acquired renal cyst of right kidney  N28.1 593.2   3. UPJ obstruction, acquired  N13.5 593.4   4. Impaired functional mobility, balance, gait, and endurance [Z74.09 (ICD-10-CM)]  Z74.09 V49.89     Patient Active Problem List   Diagnosis    Urinary tract infection with hematuria    Colitis    Non-STEMI (non-ST elevated myocardial infarction)    Atrial fibrillation with rapid ventricular response    Atrial fibrillation with RVR    A-fib    Atrial fibrillation     Past Medical History:   Diagnosis Date    Cancer     leukemia    Coronary artery disease     Diabetes mellitus     Hypertension     Injury of back     Myocardial infarction     Stroke      Past Surgical History:   Procedure Laterality Date    CARDIAC CATHETERIZATION Left 2022    Procedure: Left Heart Cath;  Surgeon: Ayaz Wild DO;  Location: [facility] CATH INVASIVE LOCATION;  Service: Cardiology;  Laterality: Left;    CARDIAC CATHETERIZATION N/A 2022    Procedure: PERCUTANEOUS CORONARY INTERVENTION;  Surgeon: Ayaz Wild DO;  Location: [facility] CATH INVASIVE LOCATION;  Service: Cardiology;  Laterality: N/A;    CATARACT EXTRACTION      TRIGEMINAL NERVE DECOMPRESSION      TUBAL ABDOMINAL LIGATION      UVULOPALATOPHARYNGOPLASTY        General Information       Row Name 23 1314          Physical Therapy Time and Intention    Document Type therapy note (daily note)  -     Mode of Treatment individual therapy;physical therapy  -       Row Name 23 1314          General Information    Patient Profile Reviewed yes  -       Row Name 23 1314          Cognition    Orientation Status (Cognition) oriented to;person;place;situation  -       Row Name  08/14/23 1314          Safety Issues, Functional Mobility    Impairments Affecting Function (Mobility) strength;endurance/activity tolerance;balance  -               User Key  (r) = Recorded By, (t) = Taken By, (c) = Cosigned By      Initials Name Provider Type    Divya Michael PTA Physical Therapist Assistant                   Mobility       Row Name 08/14/23 1342          Bed Mobility    Bed Mobility scooting/bridging;rolling left;rolling right  -     Rolling Left Cat[place] (Bed Mobility) standby assist  -     Rolling Right [place] (Bed Mobility) standby assist  -     Scooting/Bridging Cat[place] (Bed Mobility) standby assist  -     Assistive Device (Bed Mobility) head of bed elevated;bed rails  -       Row Name 08/14/23 1342          Bed-Chair Transfer    Bed-Chair Cat[place] (Transfers) unable to assess  -       Row Name 08/14/23 1342          Sit-Stand Transfer    Sit-Stand [place] (Transfers) unable to assess  -       Row Name 08/14/23 1342          Gait/Stairs (Locomotion)    Catron Level (Gait) unable to assess  -               User Key  (r) = Recorded By, (t) = Taken By, (c) = Cosigned By      Initials Name Provider Type     Divya Sandhu PTA Physical Therapist Assistant                   Obj/Interventions       Row Name 08/14/23 1337          Motor Skills    Therapeutic Exercise hip;knee;ankle  -       Row Name 08/14/23 1337          Hip (Therapeutic Exercise)    Hip (Therapeutic Exercise) AROM (active range of motion)  -     Hip AROM (Therapeutic Exercise) bilateral;aBduction;aDduction;supine;15 repititions  -       Row Name 08/14/23 1337          Knee (Therapeutic Exercise)    Knee (Therapeutic Exercise) isometric exercises;AROM (active range of motion)  -     Knee AROM (Therapeutic Exercise) bilateral;heel slides;15 repititions;supine  -     Knee Isometrics (Therapeutic Exercise) bilateral;quad sets;10 repetitions;2 sets;supine;5 second hold   -       Row Name 08/14/23 1337          Ankle (Therapeutic Exercise)    Ankle (Therapeutic Exercise) AROM (active range of motion)  -     Ankle AROM (Therapeutic Exercise) bilateral;20 repititions;supine;dorsiflexion;plantarflexion  -               User Key  (r) = Recorded By, (t) = Taken By, (c) = Cosigned By      Initials Name Provider Type     Divya Sandhu PTA Physical Therapist Assistant                   Goals/Plan       Row Name 08/14/23 1341          Bed Mobility Goal 1 (PT)    Activity/Assistive Device (Bed Mobility Goal 1, PT) sit to supine/supine to sit  -     [place] Level/Cues Needed (Bed Mobility Goal 1, PT) modified independence  -     Time Frame (Bed Mobility Goal 1, PT) by discharge  -     Strategies/Barriers (Bed Mobility Goal 1, PT) HOB elevated as necessary.  -     Progress/Outcomes (Bed Mobility Goal 1, PT) goal not met  -       Row Name 08/14/23 1341          Transfer Goal 1 (PT)    Activity/Assistive Device (Transfer Goal 1, PT) sit-to-stand/stand-to-sit;bed-to-chair/chair-to-bed;walker, rolling  -     [place] Level/Cues Needed (Transfer Goal 1, PT) modified independence  -     Time Frame (Transfer Goal 1, PT) by discharge  -     Progress/Outcome (Transfer Goal 1, PT) goal not met  -       Row Name 08/14/23 1341          Gait Training Goal 1 (PT)    Activity/Assistive Device (Gait Training Goal 1, PT) walker, rolling  -     [place] Level (Gait Training Goal 1, PT) modified independence  -     Distance (Gait Training Goal 1, PT) 150' x 1.  -     Time Frame (Gait Training Goal 1, PT) by discharge  -     Strategies/Barriers (Gait Training Goal 1, PT) Fatigued easily during evaluation.  -     Progress/Outcome (Gait Training Goal 1, PT) goal not met  -       Row Name 08/14/23 1341          Problem Specific Goal 1 (PT)    Problem Specific Goal 1 (PT) Score 25/28 on Tinetti fall risk assessment.  -     Time Frame (Problem Specific Goal 1, PT)  by discharge  -     Progress/Outcome (Problem Specific Goal 1, PT) goal not met  -               User Key  (r) = Recorded By, (t) = Taken By, (c) = Cosigned By      Initials Name Provider Type     Divya Sandhu PTA Physical Therapist Assistant                   Clinical Impression       Row Name 08/14/23 1316          Pain    Pretreatment Pain Rating 0/10 - no pain  -     Posttreatment Pain Rating 0/10 - no pain  -       Row Name 08/14/23 1316          Plan of Care Review    Plan of Care Reviewed With patient  -     Outcome Evaluation pt resting in bed. assessed BP and HR with dynamap, HR elevated and BP appears elevated. checked BP manually and appears to be lower than on dynamap. nsg ok's bed exercises only secondary to elevated HR. pt is scooting in bed modified independently. completes LE therex with no difficulty although she fatigues. repositioned for comfort. all needs in reach.  -       Row Name 08/14/23 1316          Therapy Assessment/Plan (PT)    Rehab Potential (PT) good, to achieve stated therapy goals  -     Criteria for Skilled Interventions Met (PT) yes;skilled treatment is necessary  -     Therapy Frequency (PT) other (see comments)  -       Row Name 08/14/23 1316          Vital Signs    Pre Systolic BP Rehab 167  -MH     Pre Treatment Diastolic   -MH     Intra Systolic BP Rehab 152  -MH     Intra Treatment Diastolic BP 56  -MH     Pretreatment Heart Rate (beats/min) 118  -MH     Posttreatment Heart Rate (beats/min) 117  -MH     O2 Delivery Pre Treatment room air  -     O2 Delivery Intra Treatment room air  -MH     Pre Patient Position Supine  -MH     Post Patient Position Supine  -       Row Name 08/14/23 1316          Positioning and Restraints    Pre-Treatment Position in bed  -MH     Post Treatment Position bed  -MH     In Bed notified nsg;fowlers;call light within reach;encouraged to call for assist;exit alarm on  -               User Key  (r) = Recorded By,  (t) = Taken By, (c) = Cosigned By      Initials Name Provider Type     Divya Sandhu PTA Physical Therapist Assistant                   Outcome Measures       Row Name 08/14/23 1342 08/14/23 0856       How much help from another person do you currently need...    Turning from your back to your side while in flat bed without using bedrails? 3  -MH 3  -LB    Moving from lying on back to sitting on the side of a flat bed without bedrails? 3  -MH 3  -LB    Moving to and from a bed to a chair (including a wheelchair)? 3  -MH 3  -LB    Standing up from a chair using your arms (e.g., wheelchair, bedside chair)? 3  -MH 3  -LB    Climbing 3-5 steps with a railing? 2  -MH 2  -LB    To walk in hospital room? 3  -MH 3  -LB    AM-PAC 6 Clicks Score (PT) 17  -MH 17  -LB    Highest level of mobility 5 --> Static standing  -MH 5 --> Static standing  -LB              User Key  (r) = Recorded By, (t) = Taken By, (c) = Cosigned By      Initials Name Provider Type     Divya Sandhu PTA Physical Therapist Assistant    Jaye Giordano RN Registered Nurse                                 Physical Therapy Education       Title: PT OT SLP Therapies (In Progress)       Topic: Physical Therapy (In Progress)       Point: Mobility training (In Progress)       Learning Progress Summary             Patient Acceptance, E, NR by  at 8/13/2023 1530    Comment: PT POC, hand placement with transfers, posture with gait.                         Point: Home exercise program (Not Started)       Learner Progress:  Not documented in this visit.              Point: Body mechanics (Not Started)       Learner Progress:  Not documented in this visit.              Point: Precautions (Not Started)       Learner Progress:  Not documented in this visit.                              User Key       Initials Effective Dates Name Provider Type Discipline     07/11/23 -  Zhang Jean, PT Physical Therapist PT                  PT Recommendation and  Plan     Plan of Care Reviewed With: patient  Outcome Evaluation: pt resting in bed. assessed BP and HR with dynamap, HR elevated and BP appears elevated. checked BP manually and appears to be lower than on dynamap. nsg ok's bed exercises only secondary to elevated HR. pt is scooting in bed modified independently. completes LE therex with no difficulty although she fatigues. repositioned for comfort. all needs in reach.     Time Calculation:         PT Charges       Row Name 08/14/23 1315             Time Calculation    Start Time 1310  -      Stop Time 1345  -      Time Calculation (min) 35 min  -MH      PT Received On 08/14/23  -         Time Calculation- PT    Total Timed Code Minutes- PT 35 minute(s)  -MH         Timed Charges    95947 - PT Therapeutic Exercise Minutes 20  -MH      30832 - PT Therapeutic Activity Minutes 15  -MH         Total Minutes    Timed Charges Total Minutes 35  -MH       Total Minutes 35  -MH                User Key  (r) = Recorded By, (t) = Taken By, (c) = Cosigned By      Initials Name Provider Type     Divya Sandhu PTA Physical Therapist Assistant                  Therapy Charges for Today       Code Description Service Date Service Provider Modifiers Qty    66482896805  PT THER PROC EA 15 MIN 8/14/2023 Divya Sandhu PTA GP 1    35303737202 HC PT THERAPEUTIC ACT EA 15 MIN 8/14/2023 Divya Sandhu PTA GP 1            PT G-Codes  Outcome Measure Options: AM-PAC 6 Clicks Basic Mobility (PT)  AM-PAC 6 Clicks Score (PT): 17  PT Discharge Summary  Anticipated Discharge Disposition (PT): skilled nursing facility    Divya Sandhu PTA  8/14/2023

## 2023-08-14 NOTE — PLAN OF CARE
Goal Outcome Evaluation:               Pt stable at this time.  No complaints of pain or discomfort. Will continue with care plan.

## 2023-08-14 NOTE — DISCHARGE PLACEMENT REQUEST
"Magy Smith (82 y.o. Female)       Date of Birth   1940    Social Security Number       Address   [address] [place] [address] [address] [place] [address]    Home Phone   [phone]    MRN   [id number]       [place]   [employer]    Marital Status                               Admission Date   8/10/23    Admission Type   Emergency    Admitting Provider   Behroozi, Saeid, MD    Attending Provider   Behroozi, Saeid, MD    Department, Room/Bed   [address], 316/[address]       Discharge Date       Discharge Disposition       Discharge Destination                                 Attending Provider: Behroozi, Saeid, MD    Allergies: Sulfa Antibiotics, Atorvastatin, Levofloxacin, Meclizine, Morphine    Isolation: None   Infection: None   Code Status: CPR    Ht: 157.5 cm (62.01\")   Wt: 84.6 kg (186 lb 6.4 oz)    Admission Cmt: None   Principal Problem: Atrial fibrillation with rapid ventricular response [I48.91]                   Active Insurance as of 8/10/2023       Primary Coverage       Payor Plan Insurance Group Employer/Plan Group    MEDICARE MEDICARE A & B        Payor Plan Address Payor Plan Phone Number Payor Plan Fax Number Effective Dates    [address] [phone]  12/1/1998 - None Entered    [facility] [id number]         Subscriber Name Subscriber Birth Date Member ID       MAGY SMITH 1940 [id number]               Secondary Coverage       Payor Plan Insurance Group Employer/Plan Group    [place]        Payor Plan Address Payor Plan Phone Number Payor Plan Fax Number Effective Dates    [address] [phone]  2/27/2022 - None Entered    [facility] [id number]         Subscriber Name Subscriber Birth Date Member ID       MAGY SMITH 1940 [id number]                     Emergency Contacts        (Rel.) Home Phone Work Phone Mobile Phone    MickDarshanGabriele (Son) [phone] -- [phone]    JULIO LICONA (Relative) " [phone] -- [phone]    Miriam Urena (Relative) [phone] -- [phone]    HENRRY KELLEY (Sister) [phone] -- [phone]              Insurance Information                  MEDICARE/MEDICARE A & B Phone: [phone]    Subscriber: Magy Barton Subscriber#: [id number]    Group#: -- Precert#: --        [place] Phone: [phone]    Subscriber: Magy Barton Subscriber#: [id number]    Group#: -- Precert#: --

## 2023-08-14 NOTE — PROGRESS NOTES
[facility]  INPATIENT PROGRESS NOTE    Length of Stay: 2  Date of Admission: 8/10/2023  Primary Care Physician: Sean Briones MD    Subjective   Chief Complaint: Possible kidney stone  HPI: No specific complaints.    As of today, 08/14/23  Review of Systems   Constitutional:  Negative for appetite change, chills, fatigue, fever and unexpected weight change.   Respiratory:  Negative for cough, choking, chest tightness, shortness of breath and wheezing.    Cardiovascular:  Negative for chest pain, palpitations and leg swelling.   Gastrointestinal:  Negative for abdominal pain, blood in stool, constipation, diarrhea, nausea and vomiting.   Genitourinary:  Negative for dysuria, flank pain and hematuria.   Neurological:  Negative for dizziness, seizures, syncope, speech difficulty, weakness, light-headedness, numbness and headaches.   Hematological:  Does not bruise/bleed easily.      All pertinent negatives and positives are as above. All other systems have been reviewed and are negative unless otherwise stated.    Objective    Temp:  [97.3 øF (36.3 øC)-98.2 øF (36.8 øC)] 97.8 øF (36.6 øC)  Heart Rate:  [] 80  Resp:  [16-18] 18  BP: (133-181)/(74-94) 152/86    AM-PAC 6 Clicks Score (PT): 17 (08/14/23 1342)    As of today, 08/14/23  Physical Exam  Vitals reviewed.   Constitutional:       Appearance: She is well-developed.   HENT:      Head: Normocephalic and atraumatic.   Eyes:      Pupils: Pupils are equal, round, and reactive to light.   Cardiovascular:      Rate and Rhythm: Normal rate and regular rhythm.      Heart sounds: Normal heart sounds. No murmur heard.    No friction rub. No gallop.   Pulmonary:      Effort: Pulmonary effort is normal. No respiratory distress.      Breath sounds: Normal breath sounds. No wheezing or rales.   Chest:      Chest wall: No tenderness.   Abdominal:      General: Bowel sounds are normal. There is no  distension.      Palpations: Abdomen is soft.      Tenderness: There is no abdominal tenderness. There is no guarding.   Musculoskeletal:      Cervical back: Normal range of motion and neck supple.   Skin:     General: Skin is warm and dry.   Psychiatric:         Behavior: Behavior normal.         Thought Content: Thought content normal.         Results Review:  I have reviewed the labs, radiology results, and diagnostic studies.    Laboratory Data:   Results from last 7 days   Lab Units 08/13/23  0734 08/12/23  0526 08/11/23  1139 08/10/23  1722   SODIUM mmol/L 140 139 141 139   POTASSIUM mmol/L 3.6 4.1 3.9 4.5   CHLORIDE mmol/L 103 102 102 102   CO2 mmol/L 24.0 25.0 24.0 23.0   BUN mg/dL 16 18 20 20   CREATININE mg/dL 1.33* 1.46* 1.34* 1.43*   GLUCOSE mg/dL 120* 115* 111* 109*   CALCIUM mg/dL 8.7 8.6 8.8 8.7   BILIRUBIN mg/dL 0.2 0.2  --  0.2   ALK PHOS U/L 146* 154*  --  168*   ALT (SGPT) U/L 14 14  --  13   AST (SGOT) U/L 24 26  --  25   ANION GAP mmol/L 13.0 12.0 15.0 14.0     Estimated Creatinine Clearance: 32.9 mL/min (A) (by C-G formula based on SCr of 1.33 mg/dL (H)).  Results from last 7 days   Lab Units 08/11/23  1139   MAGNESIUM mg/dL 2.3         Results from last 7 days   Lab Units 08/13/23  0734 08/12/23  0526 08/11/23  1139 08/10/23  1722   WBC 10*3/mm3 7.44 9.05 8.35 10.98*   HEMOGLOBIN g/dL 10.8* 11.0* 11.4* 11.8*   HEMATOCRIT % 33.0* 35.7 34.9 35.9   PLATELETS 10*3/mm3 240 263 228 244           Culture Data:   No results found for: BLOODCX  No results found for: URINECX  No results found for: RESPCX  No results found for: WOUNDCX  No results found for: STOOLCX  No components found for: BODYFLD    Radiology Data:   Imaging Results (Last 24 Hours)       ** No results found for the last 24 hours. **            I have utilized all available immediate resources to obtain, update, or review the patient's current medications (including all prescriptions, over-the-counter products, herbals,  cannabis/cannabidiol products, and vitamin/mineral/dietary (nutritional) supplements).       Assessment/Plan     Active Hospital Problems    Diagnosis     **Atrial fibrillation with rapid ventricular response     Atrial fibrillation     A-fib     Atrial fibrillation with RVR        Plan:  1.  Atrial fibrillation: Patient had rapid ventricular response, but has been weaned off Cardizem infusion.  Much better.  Continue current treatment.  2.  Right-sided hydronephrosis: Urology following.  Procedure planned and stable.  3.  CKD stage IIIb: Creatinine improving.  4.  Coronary artery disease: Continue current treatment.  5.  Diabetes mellitus: Continue sliding scale insulin.  6.  Hypertension: Continue current treatment.  7.  DVT prophylaxis: Heparin.      Medical Decision Making  Number and Complexity of problems: Multiple highly complex medical problems    Conditions and Status:        Condition is unchanged.     [facility] Data  External documents reviewed:       Discussed with: Patient     Treatment Plan  as above    Care Planning  Shared decision making: Pressure agreeable to plan of care  Code status and discussions: Full code    Disposition  Social Determinants of Health that impact treatment or disposition: None  I expect the patient to be discharged to home in 4-5 days.       The patient was evaluated during the global COVID-19 pandemic, and the diagnosis was suspected/considered upon their initial presentation.  Evaluation, treatment, and testing were consistent with current guidelines for patients who present with complaints or symptoms that may be related to COVID-19.    I confirmed that the patient's Advance Care Plan is present, code status is documented, or surrogate decision maker is listed in the patient's medical record.        This document has been electronically signed by Duran Kamara MD on August 14, 2023 14:38 CDT

## 2023-08-14 NOTE — SIGNIFICANT NOTE
08/14/23 0840   OTHER   Discipline physical therapy assistant   Rehab Time/Intention   Session Not Performed patient/family declined treatment  (pt is sleeping. somewhat arouses. states that she is sleeping good. wants to keep sleeping. will attempt to check back later.)   Therapy Assessment/Plan (PT)   Criteria for Skilled Interventions Met (PT) yes;skilled treatment is necessary

## 2023-08-15 LAB
ANION GAP SERPL CALCULATED.3IONS-SCNC: 10 MMOL/L (ref 5–15)
BASOPHILS # BLD AUTO: 0.1 10*3/MM3 (ref 0–0.2)
BASOPHILS NFR BLD AUTO: 1.3 % (ref 0–1.5)
BUN SERPL-MCNC: 12 MG/DL (ref 8–23)
BUN/CREAT SERPL: 9.3 (ref 7–25)
CALCIUM SPEC-SCNC: 9 MG/DL (ref 8.6–10.5)
CHLORIDE SERPL-SCNC: 105 MMOL/L (ref 98–107)
CO2 SERPL-SCNC: 26 MMOL/L (ref 22–29)
CREAT SERPL-MCNC: 1.29 MG/DL (ref 0.57–1)
DEPRECATED RDW RBC AUTO: 48.3 FL (ref 37–54)
EGFRCR SERPLBLD CKD-EPI 2021: 41.5 ML/MIN/1.73
EOSINOPHIL # BLD AUTO: 0.19 10*3/MM3 (ref 0–0.4)
EOSINOPHIL NFR BLD AUTO: 2.5 % (ref 0.3–6.2)
ERYTHROCYTE [DISTWIDTH] IN BLOOD BY AUTOMATED COUNT: 14.6 % (ref 12.3–15.4)
GLUCOSE BLDC GLUCOMTR-MCNC: 131 MG/DL (ref 70–130)
GLUCOSE BLDC GLUCOMTR-MCNC: 131 MG/DL (ref 70–130)
GLUCOSE BLDC GLUCOMTR-MCNC: 133 MG/DL (ref 70–130)
GLUCOSE BLDC GLUCOMTR-MCNC: 151 MG/DL (ref 70–130)
GLUCOSE BLDC GLUCOMTR-MCNC: 177 MG/DL (ref 70–130)
GLUCOSE SERPL-MCNC: 144 MG/DL (ref 65–99)
HCT VFR BLD AUTO: 34.3 % (ref 34–46.6)
HGB BLD-MCNC: 11.4 G/DL (ref 12–15.9)
IMM GRANULOCYTES # BLD AUTO: 0.05 10*3/MM3 (ref 0–0.05)
IMM GRANULOCYTES NFR BLD AUTO: 0.7 % (ref 0–0.5)
LYMPHOCYTES # BLD AUTO: 1.27 10*3/MM3 (ref 0.7–3.1)
LYMPHOCYTES NFR BLD AUTO: 16.9 % (ref 19.6–45.3)
MCH RBC QN AUTO: 30.3 PG (ref 26.6–33)
MCHC RBC AUTO-ENTMCNC: 33.2 G/DL (ref 31.5–35.7)
MCV RBC AUTO: 91.2 FL (ref 79–97)
MONOCYTES # BLD AUTO: 0.68 10*3/MM3 (ref 0.1–0.9)
MONOCYTES NFR BLD AUTO: 9 % (ref 5–12)
NEUTROPHILS NFR BLD AUTO: 5.24 10*3/MM3 (ref 1.7–7)
NEUTROPHILS NFR BLD AUTO: 69.6 % (ref 42.7–76)
NRBC BLD AUTO-RTO: 0 /100 WBC (ref 0–0.2)
PLATELET # BLD AUTO: 211 10*3/MM3 (ref 140–450)
PMV BLD AUTO: 9.1 FL (ref 6–12)
POTASSIUM SERPL-SCNC: 3.5 MMOL/L (ref 3.5–5.2)
RBC # BLD AUTO: 3.76 10*6/MM3 (ref 3.77–5.28)
SODIUM SERPL-SCNC: 141 MMOL/L (ref 136–145)
WBC NRBC COR # BLD: 7.53 10*3/MM3 (ref 3.4–10.8)

## 2023-08-15 PROCEDURE — 97116 GAIT TRAINING THERAPY: CPT

## 2023-08-15 PROCEDURE — 25010000002 HEPARIN (PORCINE) PER 1000 UNITS: Performed by: INTERNAL MEDICINE

## 2023-08-15 PROCEDURE — 82948 REAGENT STRIP/BLOOD GLUCOSE: CPT

## 2023-08-15 PROCEDURE — 85025 COMPLETE CBC W/AUTO DIFF WBC: CPT | Performed by: HOSPITALIST

## 2023-08-15 PROCEDURE — 97530 THERAPEUTIC ACTIVITIES: CPT

## 2023-08-15 PROCEDURE — 80048 BASIC METABOLIC PNL TOTAL CA: CPT | Performed by: HOSPITALIST

## 2023-08-15 PROCEDURE — 25010000002 CEFTRIAXONE PER 250 MG: Performed by: INTERNAL MEDICINE

## 2023-08-15 PROCEDURE — 97110 THERAPEUTIC EXERCISES: CPT

## 2023-08-15 RX ORDER — SODIUM CHLORIDE 9 MG/ML
40 INJECTION, SOLUTION INTRAVENOUS AS NEEDED
Status: DISCONTINUED | OUTPATIENT
Start: 2023-08-15 | End: 2023-08-16 | Stop reason: HOSPADM

## 2023-08-15 RX ORDER — SODIUM CHLORIDE 0.9 % (FLUSH) 0.9 %
10 SYRINGE (ML) INJECTION AS NEEDED
Status: DISCONTINUED | OUTPATIENT
Start: 2023-08-15 | End: 2023-08-16 | Stop reason: HOSPADM

## 2023-08-15 RX ORDER — SODIUM CHLORIDE 0.9 % (FLUSH) 0.9 %
10 SYRINGE (ML) INJECTION EVERY 12 HOURS SCHEDULED
Status: DISCONTINUED | OUTPATIENT
Start: 2023-08-15 | End: 2023-08-16 | Stop reason: HOSPADM

## 2023-08-15 RX ADMIN — Medication 10 ML: at 21:17

## 2023-08-15 RX ADMIN — ROSUVASTATIN CALCIUM 20 MG: 10 TABLET, FILM COATED ORAL at 21:06

## 2023-08-15 RX ADMIN — Medication 10 ML: at 08:15

## 2023-08-15 RX ADMIN — CARVEDILOL 12.5 MG: 12.5 TABLET, FILM COATED ORAL at 08:14

## 2023-08-15 RX ADMIN — HEPARIN SODIUM 5000 UNITS: 5000 INJECTION INTRAVENOUS; SUBCUTANEOUS at 08:14

## 2023-08-15 RX ADMIN — LISINOPRIL 40 MG: 40 TABLET ORAL at 08:14

## 2023-08-15 RX ADMIN — DILTIAZEM HYDROCHLORIDE 60 MG: 60 TABLET, FILM COATED ORAL at 05:37

## 2023-08-15 RX ADMIN — TICAGRELOR 90 MG: 90 TABLET ORAL at 08:14

## 2023-08-15 RX ADMIN — DILTIAZEM HYDROCHLORIDE 60 MG: 60 TABLET, FILM COATED ORAL at 21:07

## 2023-08-15 RX ADMIN — HEPARIN SODIUM 5000 UNITS: 5000 INJECTION INTRAVENOUS; SUBCUTANEOUS at 21:07

## 2023-08-15 RX ADMIN — CARVEDILOL 12.5 MG: 12.5 TABLET, FILM COATED ORAL at 17:05

## 2023-08-15 RX ADMIN — Medication 10 ML: at 21:08

## 2023-08-15 RX ADMIN — ASPIRIN 81 MG: 81 TABLET, COATED ORAL at 08:14

## 2023-08-15 RX ADMIN — DILTIAZEM HYDROCHLORIDE 60 MG: 60 TABLET, FILM COATED ORAL at 13:00

## 2023-08-15 RX ADMIN — CEFTRIAXONE SODIUM 2000 MG: 2 INJECTION, POWDER, FOR SOLUTION INTRAMUSCULAR; INTRAVENOUS at 13:00

## 2023-08-15 RX ADMIN — TICAGRELOR 90 MG: 90 TABLET ORAL at 21:07

## 2023-08-15 NOTE — PLAN OF CARE
Problem: Fall Injury Risk  Goal: Absence of Fall and Fall-Related Injury  Outcome: Ongoing, Progressing  Intervention: Promote Injury-Free Environment  Recent Flowsheet Documentation  Taken 8/15/2023 1500 by Mary Vogt RN  Safety Promotion/Fall Prevention: safety round/check completed  Taken 8/15/2023 1300 by Mary Vogt RN  Safety Promotion/Fall Prevention: safety round/check completed  Taken 8/15/2023 1100 by Mary Vogt RN  Safety Promotion/Fall Prevention: safety round/check completed  Taken 8/15/2023 0900 by Mary Vogt RN  Safety Promotion/Fall Prevention: safety round/check completed  Taken 8/15/2023 0700 by Mary Vogt RN  Safety Promotion/Fall Prevention: safety round/check completed     Problem: Dysrhythmia  Goal: Normalized Cardiac Rhythm  Outcome: Ongoing, Progressing     Problem: Skin Injury Risk Increased  Goal: Skin Health and Integrity  Outcome: Ongoing, Progressing     Problem: Diabetes Comorbidity  Goal: Blood Glucose Level Within Targeted Range  Outcome: Ongoing, Progressing     Problem: Hypertension Comorbidity  Goal: Blood Pressure in Desired Range  Outcome: Ongoing, Progressing     Problem: Adjustment to Illness (Sepsis/Septic Shock)  Goal: Optimal Coping  Outcome: Ongoing, Progressing     Problem: Bleeding (Sepsis/Septic Shock)  Goal: Absence of Bleeding  Outcome: Ongoing, Progressing     Problem: Glycemic Control Impaired (Sepsis/Septic Shock)  Goal: Blood Glucose Level Within Desired Range  Outcome: Ongoing, Progressing     Problem: Infection Progression (Sepsis/Septic Shock)  Goal: Absence of Infection Signs and Symptoms  Outcome: Ongoing, Progressing     Problem: Nutrition Impaired (Sepsis/Septic Shock)  Goal: Optimal Nutrition Intake  Outcome: Ongoing, Progressing   Goal Outcome Evaluation:   Continue IV abx. Patient working well with PT/OT today. Crulls procedure when medically stable.

## 2023-08-15 NOTE — THERAPY TREATMENT NOTE
Acute Care - Physical Therapy Treatment Note  [facility]     Patient Name: Magy Barton  : 1940  MRN: [id number]  Today's Date: 8/15/2023      Visit Dx:     ICD-10-CM ICD-9-CM   1. Atrial fibrillation with rapid ventricular response  I48.91 427.31   2. Acquired renal cyst of right kidney  N28.1 593.2   3. UPJ obstruction, acquired  N13.5 593.4   4. Impaired functional mobility, balance, gait, and endurance [Z74.09 (ICD-10-CM)]  Z74.09 V49.89     Patient Active Problem List   Diagnosis    Urinary tract infection with hematuria    Colitis    Non-STEMI (non-ST elevated myocardial infarction)    Atrial fibrillation with rapid ventricular response    Atrial fibrillation with RVR    A-fib    Atrial fibrillation     Past Medical History:   Diagnosis Date    Cancer     leukemia    Coronary artery disease     Diabetes mellitus     Hypertension     Injury of back     Myocardial infarction     Stroke      Past Surgical History:   Procedure Laterality Date    CARDIAC CATHETERIZATION Left 2022    Procedure: Left Heart Cath;  Surgeon: Ayaz Wild DO;  Location: [facility] CATH INVASIVE LOCATION;  Service: Cardiology;  Laterality: Left;    CARDIAC CATHETERIZATION N/A 2022    Procedure: PERCUTANEOUS CORONARY INTERVENTION;  Surgeon: Ayaz Wild DO;  Location: [facility] CATH INVASIVE LOCATION;  Service: Cardiology;  Laterality: N/A;    CATARACT EXTRACTION      TRIGEMINAL NERVE DECOMPRESSION      TUBAL ABDOMINAL LIGATION      UVULOPALATOPHARYNGOPLASTY       PT Assessment (last 12 hours)       PT Evaluation and Treatment       Row Name 08/15/23 1134          Physical Therapy Time and Intention    Subjective Information no complaints  -LN     Document Type therapy note (daily note)  -LN     Mode of Treatment individual therapy;physical therapy  -LN     Comment per telemetry a-fib controlled  -LN       Row Name 08/15/23 1133          General Information    Patient Profile Reviewed yes  -LN     Equipment  Currently Used at Home none  -LN       Row Name 08/15/23 1134          Home Use of Assistive/Adaptive Equipment    Equipment Currently Used at Home wheelchair  -LN       Row Name 08/15/23 1134          Pain    Pretreatment Pain Rating 0/10 - no pain  -LN     Posttreatment Pain Rating 0/10 - no pain  -LN       Row Name 08/15/23 1134          Cognition    Orientation Status (Cognition) oriented to;person;place;situation  -LN       Row Name 08/15/23 1134          Range of Motion Comprehensive    General Range of Motion bilateral lower extremity ROM WFL  -LN       Row Name 08/15/23 1134          Bed Mobility    Bed Mobility scooting/bridging;rolling left;rolling right  -LN     Rolling Left [place] (Bed Mobility) --  -LN     Rolling Right [place] (Bed Mobility) --  -LN     Scooting/Bridging [place] (Bed Mobility) --  -LN     Supine-Sit [place] (Bed Mobility) modified independence  -LN     Sit-Supine [place] (Bed Mobility) not tested  -LN     Assistive Device (Bed Mobility) head of bed elevated;bed rails  -LN       Row Name 08/15/23 1134          Bed-Chair Transfer    Bed-Chair [place] (Transfers) minimum assist (75% patient effort);contact guard  -LN     Assistive Device (Bed-Chair Transfers) walker, front-wheeled  -LN       Row Name 08/15/23 1134          Sit-Stand Transfer    Sit-Stand [place] (Transfers) contact guard;verbal cues  -LN     Assistive Device (Sit-Stand Transfers) walker, front-wheeled  -LN       Row Name 08/15/23 1134          Stand-Sit Transfer    Stand-Sit [place] (Transfers) contact guard;verbal cues  -LN     Assistive Device (Stand-Sit Transfers) walker, front-wheeled  -LN       Row Name 08/15/23 1134          Gait/Stairs (Locomotion)    [place] Level (Gait) contact guard;minimum assist (75% patient effort)  -LN     Assistive Device (Gait) walker, front-wheeled  -LN     Distance in Feet (Gait) 44  -LN     Deviations/Abnormal Patterns (Gait) gait speed  decreased  -LN     Bilateral Gait Deviations forward flexed posture  -LN       Row Name 08/15/23 1134          Safety Issues, Functional Mobility    Impairments Affecting Function (Mobility) strength;endurance/activity tolerance;balance  -LN       Row Name 08/15/23 1134          Hip (Therapeutic Exercise)    Hip (Therapeutic Exercise) AROM (active range of motion)  -LN     Hip AROM (Therapeutic Exercise) bilateral;flexion;aBduction;aDduction  -LN       Row Name 08/15/23 1134          Knee (Therapeutic Exercise)    Knee (Therapeutic Exercise) AROM (active range of motion)  -LN     Knee AROM (Therapeutic Exercise) bilateral;LAQ (long arc quad)  -LN       Row Name 08/15/23 1134          Ankle (Therapeutic Exercise)    Ankle (Therapeutic Exercise) AROM (active range of motion)  -LN     Ankle AROM (Therapeutic Exercise) bilateral;dorsiflexion;plantarflexion  -LN       Row Name 08/15/23 1134          Respiratory WDL    Respiratory WDL X;breath sounds  -LN     Rhythm/Pattern, Respiratory shortness of breath  with exertion  -LN     Expansion/Accessory Muscles/Retractions no retractions;no use of accessory muscles  -LN     Mucous Membranes pink;intact;moist  -LN     Cough Frequency infrequent  -LN     Cough Type productive  -LN       Row Name 08/15/23 1134          Breath Sounds    Breath Sounds All Fields  -LN     All Lung Fields Breath Sounds clear;equal bilaterally  -LN       Row Name 08/15/23 1134          Skin WDL    Skin WDL X  -LN     Skin Color/Characteristics pale;redness blanchable  -LN     Skin Temperature warm  -LN     Skin Moisture dry  -LN     Skin Elasticity slow return to original state  -LN     Skin Integrity intact  -LN       Row Name 08/15/23 1134          Coping    Observed Emotional State calm;cooperative  -LN     Verbalized Emotional State acceptance  -LN     Family/Support Persons family  -LN     Involvement in Care not present at bedside  -LN       Row Name 08/15/23 1134          Plan of Care Review     Plan of Care Reviewed With patient  -LN     Outcome Evaluation sup-sit mod ind,sit-stand-sit cga of 1 and vc's for hand placement;amb 44' with rw and  min/cga of 1-easily distracted and needs redirection with rx  -LN       Row Name 08/15/23 1134          Vital Signs    Pre Systolic BP Rehab 150  -LN     Pre Treatment Diastolic BP 80  -LN     Post Systolic BP Rehab 138  -LN     Post Treatment Diastolic BP 68  -LN     Pretreatment Heart Rate (beats/min) 89  per telemetry  -LN     Intratreatment Heart Rate (beats/min) 112  -LN     Posttreatment Heart Rate (beats/min) 80  -LN     Pre SpO2 (%) 94  -LN     O2 Delivery Pre Treatment room air  -LN     Post SpO2 (%) 97  -LN     Pre Patient Position Supine  -LN     Intra Patient Position Standing  -LN     Post Patient Position Sitting  -LN       Row Name 08/15/23 1134          Positioning and Restraints    Post Treatment Position chair  -LN     In Chair notified nsg;sitting;call light within reach;encouraged to call for assist;exit alarm on  -LN       Row Name 08/15/23 1130          Therapy Assessment/Plan (PT)    Rehab Potential (PT) good, to achieve stated therapy goals  -LN     Criteria for Skilled Interventions Met (PT) yes;skilled treatment is necessary  -LN     Therapy Frequency (PT) other (see comments)  -LN       Row Name 08/15/23 113          Bed Mobility Goal 1 (PT)    Activity/Assistive Device (Bed Mobility Goal 1, PT) sit to supine/supine to sit  -LN     [place] Level/Cues Needed (Bed Mobility Goal 1, PT) modified independence  -LN     Time Frame (Bed Mobility Goal 1, PT) by discharge  -LN     Strategies/Barriers (Bed Mobility Goal 1, PT) HOB elevated as necessary.  -LN     Progress/Outcomes (Bed Mobility Goal 1, PT) goal partially met  -LN       Row Name 08/15/23 1130          Transfer Goal 1 (PT)    Activity/Assistive Device (Transfer Goal 1, PT) sit-to-stand/stand-to-sit;bed-to-chair/chair-to-bed;walker, rolling  -LN     [place] Level/Cues Needed  (Transfer Goal 1, PT) modified independence  -LN     Time Frame (Transfer Goal 1, PT) by discharge  -LN     Progress/Outcome (Transfer Goal 1, PT) goal not met  -LN       Row Name 08/15/23 1134          Gait Training Goal 1 (PT)    Activity/Assistive Device (Gait Training Goal 1, PT) walker, rolling  -LN     [place] Level (Gait Training Goal 1, PT) modified independence  -LN     Distance (Gait Training Goal 1, PT) 150' x 1.  -LN     Time Frame (Gait Training Goal 1, PT) by discharge  -LN     Strategies/Barriers (Gait Training Goal 1, PT) Fatigued easily during evaluation.  -LN     Progress/Outcome (Gait Training Goal 1, PT) goal not met  -LN       Row Name 08/15/23 1134          Problem Specific Goal 1 (PT)    Problem Specific Goal 1 (PT) Score 25/28 on Tinetti fall risk assessment.  -LN     Time Frame (Problem Specific Goal 1, PT) by discharge  -LN     Progress/Outcome (Problem Specific Goal 1, PT) goal not met  -LN               User Key  (r) = Recorded By, (t) = Taken By, (c) = Cosigned By      Initials Name Provider Type    Mila Glass, PTA Physical Therapist Assistant                    Physical Therapy Education       Title: PT OT SLP Therapies (In Progress)       Topic: Physical Therapy (In Progress)       Point: Mobility training (In Progress)       Learning Progress Summary             Patient Acceptance, E, NR by SNEHAL at 8/13/2023 1530    Comment: PT POC, hand placement with transfers, posture with gait.                         Point: Home exercise program (Not Started)       Learner Progress:  Not documented in this visit.              Point: Body mechanics (Not Started)       Learner Progress:  Not documented in this visit.              Point: Precautions (Not Started)       Learner Progress:  Not documented in this visit.                              User Key       Initials Effective Dates Name Provider Type Discipline    SNEHAL 07/11/23 -  Zhang Jean, PT Physical Therapist PT                   PT Recommendation and Plan  Anticipated Discharge Disposition (PT): skilled nursing facility  Therapy Frequency (PT): other (see comments)  Plan of Care Reviewed With: patient  Outcome Evaluation: sup-sit mod ind,sit-stand-sit cga of 1 and vc's for hand placement;amb 44' with rw and  min/cga of 1-easily distracted and needs redirection with rx   Outcome Measures       Row Name 08/15/23 1134             How much help from another person do you currently need...    Turning from your back to your side while in flat bed without using bedrails? 4  -LN      Moving from lying on back to sitting on the side of a flat bed without bedrails? 4  -LN      Moving to and from a bed to a chair (including a wheelchair)? 3  -LN      Standing up from a chair using your arms (e.g., wheelchair, bedside chair)? 3  -LN      Climbing 3-5 steps with a railing? 2  -LN      To walk in hospital room? 3  -LN      AM-PAC 6 Clicks Score (PT) 19  -LN         Functional Assessment    Outcome Measure Options AM-PAC 6 Clicks Basic Mobility (PT)  -LN                User Key  (r) = Recorded By, (t) = Taken By, (c) = Cosigned By      Initials Name Provider Type    LN Mila Yates PTA Physical Therapist Assistant                     Time Calculation:    PT Charges       Row Name 08/15/23 1306             Time Calculation    Start Time 1134  -LN      Stop Time 1215  -LN      Time Calculation (min) 41 min  -LN      PT Received On 08/15/23  -LN         Time Calculation- PT    Total Timed Code Minutes- PT 41 minute(s)  -LN                User Key  (r) = Recorded By, (t) = Taken By, (c) = Cosigned By      Initials Name Provider Type    Mila Glass PTA Physical Therapist Assistant                  Therapy Charges for Today       Code Description Service Date Service Provider Modifiers Qty    01321393036 HC GAIT TRAINING EA 15 MIN 8/15/2023 Mila Yates PTA GP 1    64856382936 HC PT THER PROC EA 15 MIN 8/15/2023 Mila Yates PTA GP 1     [id number]  PT THERAPEUTIC ACT EA 15 MIN 8/15/2023 Mila Yates, PTA GP 1            PT G-Codes  Outcome Measure Options: AM-PAC 6 Clicks Basic Mobility (PT)  AM-PAC 6 Clicks Score (PT): 19    Mila Yates PTA  8/15/2023

## 2023-08-15 NOTE — PLAN OF CARE
Goal Outcome Evaluation:  Plan of Care Reviewed With: patient           Outcome Evaluation: sup-sit mod ind,sit-stand-sit cga of 1 and vc's for hand placement;amb 44' with rw and  min/cga of 1-easily distracted and needs redirection with rx      Anticipated Discharge Disposition (PT): skilled nursing facility

## 2023-08-15 NOTE — PROGRESS NOTES
[facility] Medicine Services  INPATIENT PROGRESS NOTE    Length of Stay: 3  Date of Admission: 8/10/2023  Primary Care Physician: Sean Briones MD    Subjective   Chief Complaint: Possible kidney stone  HPI: No specific complaints.  States her mental status is much better today.  Still very fuzzy about the early part of her hospital stay.    As of today, 08/15/23  Review of Systems   Constitutional:  Negative for appetite change, chills, fatigue, fever and unexpected weight change.   Respiratory:  Negative for cough, choking, chest tightness, shortness of breath and wheezing.    Cardiovascular:  Negative for chest pain, palpitations and leg swelling.   Gastrointestinal:  Negative for abdominal pain, blood in stool, constipation, diarrhea, nausea and vomiting.   Genitourinary:  Negative for dysuria, flank pain and hematuria.   Neurological:  Negative for dizziness, seizures, syncope, speech difficulty, weakness, light-headedness, numbness and headaches.   Hematological:  Does not bruise/bleed easily.      All pertinent negatives and positives are as above. All other systems have been reviewed and are negative unless otherwise stated.    Objective    Temp:  [97 øF (36.1 øC)-98 øF (36.7 øC)] 97.7 øF (36.5 øC)  Heart Rate:  [] 115  Resp:  [18] 18  BP: (132-168)/(64-92) 142/68    AM-PAC 6 Clicks Score (PT): 19 (08/15/23 1134)    As of today, 08/15/23  Physical Exam  Vitals reviewed.   Constitutional:       Appearance: She is well-developed.   HENT:      Head: Normocephalic and atraumatic.   Eyes:      Pupils: Pupils are equal, round, and reactive to light.   Cardiovascular:      Rate and Rhythm: Normal rate and regular rhythm.      Heart sounds: Normal heart sounds. No murmur heard.    No friction rub. No gallop.   Pulmonary:      Effort: Pulmonary effort is normal. No respiratory distress.      Breath sounds: Normal breath sounds. No wheezing or rales.    Chest:      Chest wall: No tenderness.   Abdominal:      General: Bowel sounds are normal. There is no distension.      Palpations: Abdomen is soft.      Tenderness: There is no abdominal tenderness. There is no guarding.   Musculoskeletal:      Cervical back: Normal range of motion and neck supple.   Skin:     General: Skin is warm and dry.   Psychiatric:         Behavior: Behavior normal.         Thought Content: Thought content normal.         Results Review:  I have reviewed the labs, radiology results, and diagnostic studies.    Laboratory Data:   Results from last 7 days   Lab Units 08/15/23  0959 08/13/23  0734 08/12/23  0526 08/11/23  1139 08/10/23  1722   SODIUM mmol/L 141 140 139   < > 139   POTASSIUM mmol/L 3.5 3.6 4.1   < > 4.5   CHLORIDE mmol/L 105 103 102   < > 102   CO2 mmol/L 26.0 24.0 25.0   < > 23.0   BUN mg/dL 12 16 18   < > 20   CREATININE mg/dL 1.29* 1.33* 1.46*   < > 1.43*   GLUCOSE mg/dL 144* 120* 115*   < > 109*   CALCIUM mg/dL 9.0 8.7 8.6   < > 8.7   BILIRUBIN mg/dL  --  0.2 0.2  --  0.2   ALK PHOS U/L  --  146* 154*  --  168*   ALT (SGPT) U/L  --  14 14  --  13   AST (SGOT) U/L  --  24 26  --  25   ANION GAP mmol/L 10.0 13.0 12.0   < > 14.0    < > = values in this interval not displayed.       Estimated Creatinine Clearance: 33.4 mL/min (A) (by C-G formula based on SCr of 1.29 mg/dL (H)).  Results from last 7 days   Lab Units 08/11/23  1139   MAGNESIUM mg/dL 2.3           Results from last 7 days   Lab Units 08/15/23  0959 08/13/23  0734 08/12/23  0526 08/11/23  1139 08/10/23  1722   WBC 10*3/mm3 7.53 7.44 9.05 8.35 10.98*   HEMOGLOBIN g/dL 11.4* 10.8* 11.0* 11.4* 11.8*   HEMATOCRIT % 34.3 33.0* 35.7 34.9 35.9   PLATELETS 10*3/mm3 211 240 263 228 244             Culture Data:   No results found for: BLOODCX  No results found for: URINECX  No results found for: RESPCX  No results found for: WOUNDCX  No results found for: STOOLCX  No components found for: BODYFLD    Radiology Data:    Imaging Results (Last 24 Hours)       ** No results found for the last 24 hours. **            I have utilized all available immediate resources to obtain, update, or review the patient's current medications (including all prescriptions, over-the-counter products, herbals, cannabis/cannabidiol products, and vitamin/mineral/dietary (nutritional) supplements).       Assessment/Plan     Active Hospital Problems    Diagnosis     **Atrial fibrillation with rapid ventricular response     Atrial fibrillation     A-fib     Atrial fibrillation with RVR        Plan:  1.  Atrial fibrillation: Patient had rapid ventricular response, but has been weaned off Cardizem infusion.  Much better.  Continue current treatment.  2.  Right-sided hydronephrosis: Urology following.  Patient currently hemodynamically stable for procedure at urology's convenience.  3.  CKD stage IIIb: Creatinine improving.  4.  Coronary artery disease: Continue current treatment.  5.  Diabetes mellitus: Continue sliding scale insulin.  6.  Hypertension: Continue current treatment.  7.  DVT prophylaxis: Heparin.      Medical Decision Making  Number and Complexity of problems: Multiple highly complex medical problems    Conditions and Status:        Condition is unchanged.     MDM Data  External documents reviewed:       Discussed with: Patient     Treatment Plan  as above    Care Planning  Shared decision making: Pressure agreeable to plan of care  Code status and discussions: Full code    Disposition  Social Determinants of Health that impact treatment or disposition: None  I expect the patient to be discharged to home in 4-5 days.       The patient was evaluated during the global COVID-19 pandemic, and the diagnosis was suspected/considered upon their initial presentation.  Evaluation, treatment, and testing were consistent with current guidelines for patients who present with complaints or symptoms that may be related to COVID-19.    I confirmed that the  patient's Advance Care Plan is present, code status is documented, or surrogate decision maker is listed in the patient's medical record.        This document has been electronically signed by Duarn Kamara MD on August 15, 2023 13:45 CDT

## 2023-08-16 VITALS
SYSTOLIC BLOOD PRESSURE: 156 MMHG | OXYGEN SATURATION: 94 % | HEART RATE: 73 BPM | WEIGHT: 181.6 LBS | RESPIRATION RATE: 18 BRPM | DIASTOLIC BLOOD PRESSURE: 83 MMHG | HEIGHT: 62 IN | BODY MASS INDEX: 33.42 KG/M2 | TEMPERATURE: 97.4 F

## 2023-08-16 PROBLEM — I50.22 CHRONIC SYSTOLIC HEART FAILURE: Status: ACTIVE | Noted: 2023-08-16

## 2023-08-16 LAB
ANION GAP SERPL CALCULATED.3IONS-SCNC: 15 MMOL/L (ref 5–15)
BACTERIA SPEC AEROBE CULT: NORMAL
BASOPHILS # BLD AUTO: 0.14 10*3/MM3 (ref 0–0.2)
BASOPHILS NFR BLD AUTO: 1.5 % (ref 0–1.5)
BUN SERPL-MCNC: 12 MG/DL (ref 8–23)
BUN/CREAT SERPL: 10 (ref 7–25)
CALCIUM SPEC-SCNC: 9 MG/DL (ref 8.6–10.5)
CHLORIDE SERPL-SCNC: 102 MMOL/L (ref 98–107)
CO2 SERPL-SCNC: 22 MMOL/L (ref 22–29)
CREAT SERPL-MCNC: 1.2 MG/DL (ref 0.57–1)
DEPRECATED RDW RBC AUTO: 46.6 FL (ref 37–54)
EGFRCR SERPLBLD CKD-EPI 2021: 45.3 ML/MIN/1.73
EOSINOPHIL # BLD AUTO: 0.24 10*3/MM3 (ref 0–0.4)
EOSINOPHIL NFR BLD AUTO: 2.6 % (ref 0.3–6.2)
ERYTHROCYTE [DISTWIDTH] IN BLOOD BY AUTOMATED COUNT: 14.5 % (ref 12.3–15.4)
GLUCOSE BLDC GLUCOMTR-MCNC: 121 MG/DL (ref 70–130)
GLUCOSE BLDC GLUCOMTR-MCNC: 154 MG/DL (ref 70–130)
GLUCOSE SERPL-MCNC: 132 MG/DL (ref 65–99)
HCT VFR BLD AUTO: 34.6 % (ref 34–46.6)
HGB BLD-MCNC: 11.3 G/DL (ref 12–15.9)
IMM GRANULOCYTES # BLD AUTO: 0.06 10*3/MM3 (ref 0–0.05)
IMM GRANULOCYTES NFR BLD AUTO: 0.7 % (ref 0–0.5)
LYMPHOCYTES # BLD AUTO: 1.7 10*3/MM3 (ref 0.7–3.1)
LYMPHOCYTES NFR BLD AUTO: 18.8 % (ref 19.6–45.3)
MCH RBC QN AUTO: 29.5 PG (ref 26.6–33)
MCHC RBC AUTO-ENTMCNC: 32.7 G/DL (ref 31.5–35.7)
MCV RBC AUTO: 90.3 FL (ref 79–97)
MONOCYTES # BLD AUTO: 1.09 10*3/MM3 (ref 0.1–0.9)
MONOCYTES NFR BLD AUTO: 12 % (ref 5–12)
NEUTROPHILS NFR BLD AUTO: 5.83 10*3/MM3 (ref 1.7–7)
NEUTROPHILS NFR BLD AUTO: 64.4 % (ref 42.7–76)
NRBC BLD AUTO-RTO: 0 /100 WBC (ref 0–0.2)
PLATELET # BLD AUTO: 220 10*3/MM3 (ref 140–450)
PMV BLD AUTO: 9.3 FL (ref 6–12)
POTASSIUM SERPL-SCNC: 3.3 MMOL/L (ref 3.5–5.2)
RBC # BLD AUTO: 3.83 10*6/MM3 (ref 3.77–5.28)
SARS-COV-2 RNA RESP QL NAA+PROBE: NOT DETECTED
SODIUM SERPL-SCNC: 139 MMOL/L (ref 136–145)
WBC NRBC COR # BLD: 9.06 10*3/MM3 (ref 3.4–10.8)

## 2023-08-16 PROCEDURE — 25010000002 HEPARIN (PORCINE) PER 1000 UNITS: Performed by: INTERNAL MEDICINE

## 2023-08-16 PROCEDURE — 82948 REAGENT STRIP/BLOOD GLUCOSE: CPT

## 2023-08-16 PROCEDURE — 87635 SARS-COV-2 COVID-19 AMP PRB: CPT | Performed by: HOSPITALIST

## 2023-08-16 PROCEDURE — 80048 BASIC METABOLIC PNL TOTAL CA: CPT | Performed by: HOSPITALIST

## 2023-08-16 PROCEDURE — 85025 COMPLETE CBC W/AUTO DIFF WBC: CPT | Performed by: HOSPITALIST

## 2023-08-16 RX ORDER — DILTIAZEM HYDROCHLORIDE 60 MG/1
60 TABLET, FILM COATED ORAL EVERY 8 HOURS SCHEDULED
Qty: 90 TABLET | Refills: 0 | Status: SHIPPED | OUTPATIENT
Start: 2023-08-16

## 2023-08-16 RX ADMIN — Medication 10 ML: at 08:42

## 2023-08-16 RX ADMIN — HEPARIN SODIUM 5000 UNITS: 5000 INJECTION INTRAVENOUS; SUBCUTANEOUS at 08:41

## 2023-08-16 RX ADMIN — DILTIAZEM HYDROCHLORIDE 60 MG: 60 TABLET, FILM COATED ORAL at 06:00

## 2023-08-16 RX ADMIN — TICAGRELOR 90 MG: 90 TABLET ORAL at 08:41

## 2023-08-16 RX ADMIN — LISINOPRIL 40 MG: 40 TABLET ORAL at 08:41

## 2023-08-16 RX ADMIN — ASPIRIN 81 MG: 81 TABLET, COATED ORAL at 08:41

## 2023-08-16 RX ADMIN — CARVEDILOL 12.5 MG: 12.5 TABLET, FILM COATED ORAL at 08:41

## 2023-08-16 NOTE — CONSULTS
"Adult Nutrition  Assessment/PES    Patient Name:  Magy Barton  YOB: 1940  MRN: [id number]  Admit Date:  8/10/2023    Assessment Date:  8/16/2023    Comments:  RD staff seeing for length of stay.  Pt reports appetite is okay but lower than usual.  Pt denies chewing/swallowing difficulty, food allergies, or change in wt.  Discussed supplement with pt as average intake is 38% for 12 meals.  Pt did not want a supplement because she has \"no problem in eating\".  Per notes, pt was waiting procedure by urology but now has d/c orders.  RD would recommend supplement to help maintain nutritional status.       Active Hospital Problems:  Active Hospital Problems    Diagnosis  POA    **Atrial fibrillation with rapid ventricular response [I48.91]  Yes    Chronic systolic heart failure [I50.22]  Unknown    Atrial fibrillation [I48.91]  Yes    A-fib [I48.91]  Yes    Atrial fibrillation with RVR [I48.91]  Yes      Resolved Hospital Problems   No resolved problems to display.     Past Medical History:   Diagnosis Date    Cancer 2009    leukemia    Coronary artery disease     Diabetes mellitus     Hypertension     Injury of back     Myocardial infarction     Stroke         Reason for Assessment       Row Name 08/16/23 1249          Reason for Assessment    Reason For Assessment per organizational policy  LOS                    Nutrition/Diet History       Row Name 08/16/23 1249          Nutrition/Diet History    Typical Intake (Food/Fluid/EN/PN) pt reports appetite is okay but lower than usual.  Reports she is sometimes picky about the foods she eats     Factors Affecting Nutritional Intake appetite                    Labs/Tests/Procedures/Meds       Row Name 08/16/23 1250          Labs/Procedures/Meds    Lab Results Reviewed reviewed, pertinent     Lab Results Comments K 3.3, Glu 154, Cr 1.2, A1c 7.9, Alb 3.3        Diagnostic Tests/Procedures    Diagnostic Test/Procedure Reviewed reviewed        Medications    " Pertinent Medications Reviewed reviewed, pertinent     Pertinent Medications Comments crestor, pericolace                      Estimated/Assessed Needs - Anthropometrics       Row Name 08/16/23 1252 08/16/23 0602       Anthropometrics    Weight -- 82.4 kg (181 lb 9.6 oz)    Weight for Calculation 82.1 kg (181 lb) --       Estimated/Assessed Needs    Additional Documentation KCAL/KG (Group);Protein Requirements (Group);Fluid Requirements (Group) --       KCAL/KG    KCAL/KG 18 Kcal/Kg (kcal) --    18 Kcal/Kg (kcal) 1477.818 --       Protein Requirements    Weight Used For Protein Calculations 82.1 kg (181 lb) --    Est Protein Requirement Amount (gms/kg) 0.8 gm protein --    Estimated Protein Requirements (gms/day) 65.68 --       Fluid Requirements    Fluid Requirements (mL/day) 1500 --    RDA Method (mL) 1500 --                   Nutrition Prescription Ordered       Row Name 08/16/23 1252          Nutrition Prescription PO    Current PO Diet Regular     Common Modifiers Cardiac;Consistent Carbohydrate                    Evaluation of Received Nutrient/Fluid Intake       Row Name 08/16/23 1252          PO Evaluation    Number of Meals 12     % PO Intake 38                       Problem/Interventions:   Problem 1       Row Name 08/16/23 1254          Nutrition Diagnoses Problem 1    Problem 1 Inadequate Nutrient Intake     Etiology (related to) Factors Affecting Nutrition     Appetite Fair;Poor     Signs/Symptoms (evidenced by) PO Intake     Percent (%) intake recorded 38 %     Over number of meals 12                          Intervention Goal       Row Name 08/16/23 1254          Intervention Goal    General Maintain nutrition;Meet nutritional needs for age/condition     PO Meet estimated needs                    Nutrition Intervention       Row Name 08/16/23 1254          Nutrition Intervention    RD/Tech Action Follow Tx progress;Care plan reviewd;Encourage intake;Recommend/ordered     Recommended/Ordered Supplement                       Education/Evaluation       Row Name 08/16/23 1255          Education    Education Education topics;Advised regarding habits/behavior     Education Topics Basic nutrition     Advised Regarding Habits/Behavior Use supplement        Monitor/Evaluation    Monitor Per protocol;PO intake;Pertinent labs;Weight     Education Follow-up Reinforce PRN                     Electronically signed by:  Bertha Hdz RD  08/16/23 12:55 CDT

## 2023-08-16 NOTE — PLAN OF CARE
Problem: Adult Inpatient Plan of Care  Goal: Plan of Care Review  Outcome: Ongoing, Progressing  Flowsheets (Taken 8/16/2023 0138)  Progress: improving  Plan of Care Reviewed With: patient  Outcome Evaluation: VSS throughout shift, pt in chronic Afib on tele with HR controlled. Pt ambulating with walker to bathroom. pt on room air to maintain O2 SATs > 92%. Pt awaiting scheduling of CRULLS procedure from fellows. pt medically stable.

## 2023-08-16 NOTE — DISCHARGE SUMMARY
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]  Acquired renal cyst of right kidney [N28.1]  A-fib [I48.91]  Atrial fibrillation [I48.91]       Final Discharge Diagnoses:  Active Hospital Problems    Diagnosis     **Atrial fibrillation with rapid ventricular response     Chronic systolic heart failure     Atrial fibrillation     A-fib     Atrial fibrillation with RVR        Consults:   Consults       Date and Time Order Name Status Description    8/11/2023  7:24 AM Inpatient Urology Consult Completed     8/10/2023 10:03 PM Hospitalist (on-call MD unless specified)            Pertinent Test Results:   Lab Results (most recent)       Procedure Component Value Units Date/Time    POC Glucose Once [[id number]]  (Abnormal) Collected: 08/16/23 1025    Specimen: Blood Updated: 08/16/23 1108     Glucose 154 mg/dL      Comment: RN NotifiedOperator: [id number] JARRED AMORYMeter ID: [id number]       Basic Metabolic Panel [[id number]]  (Abnormal) Collected: 08/16/23 0530    Specimen: Blood Updated: 08/16/23 0641     Glucose 132 mg/dL      BUN 12 mg/dL      Creatinine 1.20 mg/dL      Sodium 139 mmol/L      Potassium 3.3 mmol/L      Chloride 102 mmol/L      CO2 22.0 mmol/L      Calcium 9.0 mg/dL      BUN/Creatinine Ratio 10.0     Anion Gap 15.0 mmol/L      eGFR 45.3 mL/min/1.73     Narrative:      GFR Normal >60  Chronic Kidney Disease <60  Kidney Failure <15    The GFR formula is only valid for adults with stable renal function between ages 18 and 70.    POC Glucose Once [[id number]]  (Normal) Collected: 08/16/23 0550    Specimen: Blood Updated: 08/16/23 0638     Glucose 121 mg/dL       Comment: RN NotifiedOperator: [id number] MARIANA Madrigal ID: [id number]       CBC & Differential [[id number]]  (Abnormal) Collected: 08/16/23 0530    Specimen: Blood Updated: 08/16/23 0623    Narrative:      The following orders were created for panel order CBC & Differential.  Procedure                               Abnormality         Status                     ---------                               -----------         ------                     CBC Auto Differential[[id number]]        Abnormal            Final result                 Please view results for these tests on the individual orders.    CBC Auto Differential [[id number]]  (Abnormal) Collected: 08/16/23 0530    Specimen: Blood Updated: 08/16/23 0623     WBC 9.06 10*3/mm3      RBC 3.83 10*6/mm3      Hemoglobin 11.3 g/dL      Hematocrit 34.6 %      MCV 90.3 fL      MCH 29.5 pg      MCHC 32.7 g/dL      RDW 14.5 %      RDW-SD 46.6 fl      MPV 9.3 fL      Platelets 220 10*3/mm3      Neutrophil % 64.4 %      Lymphocyte % 18.8 %      Monocyte % 12.0 %      Eosinophil % 2.6 %      Basophil % 1.5 %      Immature Grans % 0.7 %      Neutrophils, Absolute 5.83 10*3/mm3      Lymphocytes, Absolute 1.70 10*3/mm3      Monocytes, Absolute 1.09 10*3/mm3      Eosinophils, Absolute 0.24 10*3/mm3      Basophils, Absolute 0.14 10*3/mm3      Immature Grans, Absolute 0.06 10*3/mm3      nRBC 0.0 /100 WBC     Blood Culture - Blood, Arm, Right [[id number]]  (Normal) Collected: 08/11/23 1241    Specimen: Blood from Arm, Right Updated: 08/15/23 1315     Blood Culture No growth at 4 days    Basic Metabolic Panel [[id number]]  (Abnormal) Collected: 08/15/23 0959    Specimen: Blood Updated: 08/15/23 1037     Glucose 144 mg/dL      BUN 12 mg/dL      Creatinine 1.29 mg/dL      Sodium 141 mmol/L      Potassium 3.5 mmol/L      Chloride 105 mmol/L      CO2 26.0 mmol/L      Calcium 9.0 mg/dL      BUN/Creatinine Ratio 9.3     Anion Gap 10.0 mmol/L      eGFR 41.5 mL/min/1.73     Narrative:       GFR Normal >60  Chronic Kidney Disease <60  Kidney Failure <15    The GFR formula is only valid for adults with stable renal function between ages 18 and 70.    CBC & Differential [[id number]]  (Abnormal) Collected: 08/15/23 0959    Specimen: Blood Updated: 08/15/23 1005    Narrative:      The following orders were created for panel order CBC & Differential.  Procedure                               Abnormality         Status                     ---------                               -----------         ------                     CBC Auto Differential[[id number]]        Abnormal            Final result                 Please view results for these tests on the individual orders.    CBC Auto Differential [[id number]]  (Abnormal) Collected: 08/15/23 0959    Specimen: Blood Updated: 08/15/23 1005     WBC 7.53 10*3/mm3      RBC 3.76 10*6/mm3      Hemoglobin 11.4 g/dL      Hematocrit 34.3 %      MCV 91.2 fL      MCH 30.3 pg      MCHC 33.2 g/dL      RDW 14.6 %      RDW-SD 48.3 fl      MPV 9.1 fL      Platelets 211 10*3/mm3      Neutrophil % 69.6 %      Lymphocyte % 16.9 %      Monocyte % 9.0 %      Eosinophil % 2.5 %      Basophil % 1.3 %      Immature Grans % 0.7 %      Neutrophils, Absolute 5.24 10*3/mm3      Lymphocytes, Absolute 1.27 10*3/mm3      Monocytes, Absolute 0.68 10*3/mm3      Eosinophils, Absolute 0.19 10*3/mm3      Basophils, Absolute 0.10 10*3/mm3      Immature Grans, Absolute 0.05 10*3/mm3      nRBC 0.0 /100 WBC     Comprehensive Metabolic Panel [[id number]]  (Abnormal) Collected: 08/13/23 0734    Specimen: Blood Updated: 08/13/23 0806     Glucose 120 mg/dL      BUN 16 mg/dL      Creatinine 1.33 mg/dL      Sodium 140 mmol/L      Potassium 3.6 mmol/L      Chloride 103 mmol/L      CO2 24.0 mmol/L      Calcium 8.7 mg/dL      Total Protein 7.4 g/dL      Albumin 3.3 g/dL      ALT (SGPT) 14 U/L      AST (SGOT) 24 U/L      Alkaline Phosphatase 146 U/L      Total Bilirubin 0.2 mg/dL      Globulin 4.1 gm/dL       A/G Ratio 0.8 g/dL      BUN/Creatinine Ratio 12.0     Anion Gap 13.0 mmol/L      eGFR 40.0 mL/min/1.73     Narrative:      GFR Normal >60  Chronic Kidney Disease <60  Kidney Failure <15    The GFR formula is only valid for adults with stable renal function between ages 18 and 70.    CBC (No Diff) [[id number]]  (Abnormal) Collected: 08/13/23 0734    Specimen: Blood Updated: 08/13/23 0750     WBC 7.44 10*3/mm3      RBC 3.61 10*6/mm3      Hemoglobin 10.8 g/dL      Hematocrit 33.0 %      MCV 91.4 fL      MCH 29.9 pg      MCHC 32.7 g/dL      RDW 14.5 %      RDW-SD 47.8 fl      MPV 8.9 fL      Platelets 240 10*3/mm3     Urine Culture - Urine, Urine, Clean Catch [[id number]] Collected: 08/10/23 2040    Specimen: Urine, Clean Catch Updated: 08/12/23 1515     Urine Culture 50,000 CFU/mL Mixed Tg Isolated    Narrative:      Specimen contains mixed organisms of questionable pathogenicity suggestive of contamination. If symptoms persist, suggest recollection.  Colonization of the urinary tract without infection is common. Treatment is discouraged unless the patient is symptomatic, pregnant, or undergoing an invasive urologic procedure.    Comprehensive Metabolic Panel [[id number]]  (Abnormal) Collected: 08/12/23 0526    Specimen: Blood Updated: 08/12/23 0603     Glucose 115 mg/dL      BUN 18 mg/dL      Creatinine 1.46 mg/dL      Sodium 139 mmol/L      Potassium 4.1 mmol/L      Comment: Slight hemolysis detected by analyzer. Results may be affected.        Chloride 102 mmol/L      CO2 25.0 mmol/L      Calcium 8.6 mg/dL      Total Protein 7.7 g/dL      Albumin 3.1 g/dL      ALT (SGPT) 14 U/L      AST (SGOT) 26 U/L      Comment: Slight hemolysis detected by analyzer. Results may be affected.        Alkaline Phosphatase 154 U/L      Total Bilirubin 0.2 mg/dL      Globulin 4.6 gm/dL      A/G Ratio 0.7 g/dL      BUN/Creatinine Ratio 12.3     Anion Gap 12.0 mmol/L      eGFR 35.8 mL/min/1.73     Narrative:      GFR Normal >60  Chronic  Kidney Disease <60  Kidney Failure <15    The GFR formula is only valid for adults with stable renal function between ages 18 and 70.    CBC (No Diff) [[id number]]  (Abnormal) Collected: 08/12/23 0526    Specimen: Blood Updated: 08/12/23 0543     WBC 9.05 10*3/mm3      RBC 3.81 10*6/mm3      Hemoglobin 11.0 g/dL      Hematocrit 35.7 %      MCV 93.7 fL      MCH 28.9 pg      MCHC 30.8 g/dL      RDW 14.5 %      RDW-SD 48.9 fl      MPV 9.1 fL      Platelets 263 10*3/mm3     CRE Screen by PCR - Swab, Large Intestine, Rectum [[id number]] Collected: 08/11/23 1712    Specimen: Swab from Large Intestine, Rectum Updated: 08/11/23 1844     CRE SCREEN Not Detected     Comment: Test performed by real-time polymerase chain reaction (qPCR).        OXA 48 Strain Not Detected     IMP STRAIN Not Detected     VIM STRAIN Not Detected     NDM Strain Not Detected     KPC Strain Not Detected    Magnesium [[id number]]  (Normal) Collected: 08/11/23 1139    Specimen: Blood Updated: 08/11/23 1225     Magnesium 2.3 mg/dL     TSH [[id number]]  (Normal) Collected: 08/11/23 1139    Specimen: Blood Updated: 08/11/23 1216     TSH 3.500 uIU/mL     T4, Free [[id number]]  (Normal) Collected: 08/11/23 1139    Specimen: Blood Updated: 08/11/23 1216     Free T4 1.36 ng/dL     Narrative:      Results may be falsely increased if patient taking Biotin.      Hemoglobin A1c [[id number]]  (Abnormal) Collected: 08/11/23 1139    Specimen: Blood Updated: 08/11/23 1202     Hemoglobin A1C 7.90 %     Narrative:      Hemoglobin A1C Ranges:    Increased Risk for Diabetes  5.7% to 6.4%  Diabetes                     >= 6.5%  Diabetic Goal                < 7.0%    High Sensitivity Troponin T 2Hr [[id number]]  (Abnormal) Collected: 08/10/23 2249    Specimen: Blood Updated: 08/10/23 2313     HS Troponin T 79 ng/L      Troponin T Delta 8 ng/L     Narrative:      High Sensitive Troponin T Reference Range:  <10.0 ng/L- Negative Female for AMI  <15.0 ng/L- Negative Male for AMI  >=10  - Abnormal Female indicating possible myocardial injury.  >=15 - Abnormal Male indicating possible myocardial injury.   Clinicians would have to utilize clinical acumen, EKG, Troponin, and serial changes to determine if it is an Acute Myocardial Infarction or myocardial injury due to an underlying chronic condition.         High Sensitivity Troponin T [[id number]]  (Abnormal) Collected: 08/10/23 1722    Specimen: Blood Updated: 08/10/23 2222     HS Troponin T 71 ng/L     Narrative:      High Sensitive Troponin T Reference Range:  <10.0 ng/L- Negative Female for AMI  <15.0 ng/L- Negative Male for AMI  >=10 - Abnormal Female indicating possible myocardial injury.  >=15 - Abnormal Male indicating possible myocardial injury.   Clinicians would have to utilize clinical acumen, EKG, Troponin, and serial changes to determine if it is an Acute Myocardial Infarction or myocardial injury due to an underlying chronic condition.         BNP [[id number]]  (Abnormal) Collected: 08/10/23 1722    Specimen: Blood Updated: 08/10/23 2220     proBNP 9,468.0 pg/mL     Narrative:      Among patients with dyspnea, NT-proBNP is highly sensitive for the detection of acute congestive heart failure. In addition NT-proBNP of <300 pg/ml effectively rules out acute congestive heart failure with 99% negative predictive value.      Urinalysis With Microscopic If Indicated (No Culture) - Urine, Clean Catch [[id number]]  (Abnormal) Collected: 08/10/23 2040    Specimen: Urine, Clean Catch Updated: 08/10/23 2048     Color, UA Yellow     Appearance, UA Cloudy     pH, UA 7.0     Specific Gravity, UA 1.009     Glucose, UA Negative     Ketones, UA 15 mg/dL (1+)     Bilirubin, UA Negative     Blood, UA Negative     Protein, UA Trace     Leuk Esterase, UA Trace     Nitrite, UA Negative     Urobilinogen, UA 0.2 E.U./dL    Urinalysis, Microscopic Only - Urine, Clean Catch [[id number]]  (Abnormal) Collected: 08/10/23 2040    Specimen: Urine, Clean Catch Updated:  08/10/23 2048     RBC, UA 0-2 /HPF      WBC, UA 0-2 /HPF      Bacteria, UA 1+ /HPF      Squamous Epithelial Cells, UA 3-5 /HPF      Hyaline Casts, UA None Seen /LPF      Methodology Automated Microscopy    [place] Draw [[id number]] Collected: 08/10/23 1722    Specimen: Blood Updated: 08/10/23 1832    Narrative:      The following orders were created for panel order [place] Draw.  Procedure                               Abnormality         Status                     ---------                               -----------         ------                     Green Top (Gel)[[id number]]                                  Final result               Lavender Top[[id number]]                                     Final result               Gold Top - SST[[id number]]                                   Final result               Light Blue Top[[id number]]                                   Final result                 Please view results for these tests on the individual orders.    Green Top (Gel) [[id number]] Collected: 08/10/23 1722    Specimen: Blood Updated: 08/10/23 1832     Extra Tube Hold for add-ons.     Comment: Auto resulted.       Lavender Top [[id number]] Collected: 08/10/23 1722    Specimen: Blood Updated: 08/10/23 1832     Extra Tube hold for add-on     Comment: Auto resulted       Gold Top - SST [[id number]] Collected: 08/10/23 1722    Specimen: Blood Updated: 08/10/23 1832     Extra Tube Hold for add-ons.     Comment: Auto resulted.       Light Blue Top [[id number]] Collected: 08/10/23 1722    Specimen: Blood Updated: 08/10/23 1832     Extra Tube Hold for add-ons.     Comment: Auto resulted       Lipase [[id number]]  (Normal) Collected: 08/10/23 1722    Specimen: Blood Updated: 08/10/23 1827     Lipase 19 U/L     Lactic Acid, Plasma [[id number]]  (Normal) Collected: 08/10/23 1722    Specimen: Blood Updated: 08/10/23 1821     Lactate 1.2 mmol/L           Imaging Results (Most Recent)       Procedure Component Value Units Date/Time     US Guided Vascular Access [[id number]] Collected: 08/11/23 1413     Updated: 08/11/23 1630    Narrative:      Targeted grayscale ultrasound with compression of the right basilic vein  demonstrates patency.    IR Insert Midline Without Port Pump 5 Plus [[id number]] Resulted: 08/11/23 1335     Updated: 08/11/23 1335    Narrative:      This procedure was auto-finalized with no dictation required.    CT Abdomen Pelvis Without Contrast [[id number]] Collected: 08/10/23 2049     Updated: 08/10/23 2116    Narrative:      EXAM:  CT abdomen and pelvis without contrast.    COMPARISON:  CT abdomen and pelvis without contrast, January 9, 2023    HISTORY:  Flank pain, unspecified side.  Kidney stone suspected.    TECHNIQUE:  Axial images from the level of the diaphragms through the pelvis were performed  followed by 2D multiplanar reformats.    FINDINGS:  Small bilateral pleural effusions with mild bibasilar atelectasis.  Heart size  is grossly within normal limits.    Liver, spleen, pancreas, adrenal glands and left kidney are unremarkable for a  noncontrast exam.  Redemonstration of right-sided hydronephrosis which has the  appearance of UPJ obstruction.  This is grossly stable.  However, this could  also be due to a large renal cyst or parapelvic cysts.    No lymphadenopathy identified in the abdomen or pelvis by size criteria.    Bladder is unremarkable.  Uterus is small and atrophic which is not unexpected  for age.  There is colonic diverticulitis without evidence of acute  diverticulitis.  The appendix is not definitely seen.  No free fluid.    No acute osseous abnormality.      Impression:      1.  There is redemonstration of a large cystic structure in the region of the  right renal pelvis.  It is uncertain if this represents a large renal cyst or  peripelvic renal cysts or if this may represent a dilated renal pelvis in the  setting of UPJ obstruction.  There does appear to be a component of  hydronephrosis on today's  "exam with dilatation of renal calyces.  This was not  seen on the prior exam.  As a result, this represents UPJ obstruction, this is  presumably worsening.  Alternatively, this could represent a peripelvic cyst  with interval development of right-sided hydronephrosis due to ureteral  obstruction, stricture or lesion.  There is no discrete ureteral stone  identified.    2.  Small bilateral pleural effusions.    Additional findings as described above.            Chief Complaint on Day of Discharge: None    Hospital Course:  The patient is a 82 y.o. female who presented to [facility] with possible kidney stone and confusion.  Patient was found to have A-fib with RVR and was started on Cardizem infusion.  Echocardiography was completed.  Patient was found to have right-sided hydronephrosis and CT scan was done and had a cystic type pelvic mass.  Urology was consulted and plan for CRULLS procedure was made, but patient needed to be stabilized.  Patient stabilized nicely, but was on aspirin and Brilinta.  After conversation with Dr. Clark, it was decided that patient would be discharged and will follow-up with Dr. Moss in the office.  Discussed with cardiology regarding anticoagulation as patient is high risk based on [id number] score.  Plan will be made for anticoagulation and close cardiology follow-up.  She was discharged home in good condition    Condition on Discharge: Hemodynamically stable    Physical Exam on Discharge:  /83 (BP Location: Right arm, Patient Position: Lying)   Pulse 73   Temp 97.4 øF (36.3 øC) (Temporal)   Resp 18   Ht 157.5 cm (62.01\")   Wt 82.4 kg (181 lb 9.6 oz)   SpO2 94%   BMI 33.21 kg/mý   Physical Exam  Vitals reviewed.   Constitutional:       Appearance: She is well-developed.   HENT:      Head: Normocephalic and atraumatic.   Eyes:      Pupils: Pupils are equal, round, and reactive to light.   Cardiovascular:      Rate and Rhythm: Normal rate and " regular rhythm.      Heart sounds: Normal heart sounds. No murmur heard.    No friction rub. No gallop.   Pulmonary:      Effort: Pulmonary effort is normal. No respiratory distress.      Breath sounds: Normal breath sounds. No wheezing or rales.   Chest:      Chest wall: No tenderness.   Abdominal:      General: Bowel sounds are normal. There is no distension.      Palpations: Abdomen is soft.      Tenderness: There is no abdominal tenderness. There is no guarding.   Musculoskeletal:      Cervical back: Normal range of motion and neck supple.   Skin:     General: Skin is warm and dry.   Psychiatric:         Behavior: Behavior normal.         Thought Content: Thought content normal.         Discharge Disposition:  Skilled Nursing Facility (DC - External)    Discharge Medications:     Discharge Medications        New Medications        Instructions Start Date   apixaban 5 MG tablet tablet  Commonly known as: ELIQUIS   5 mg, Oral, 2 Times Daily      dilTIAZem 60 MG tablet  Commonly known as: CARDIZEM   60 mg, Oral, Every 8 Hours Scheduled             Continue These Medications        Instructions Start Date   acetaminophen 325 MG tablet  Commonly known as: TYLENOL   650 mg, Oral, Every 6 Hours PRN      albuterol (2.5 MG/3ML) 0.083% nebulizer solution  Commonly known as: PROVENTIL   2.5 mg, Nebulization, Every 6 Hours PRN      BASAGLAR KWIKPEN 100 UNIT/ML injection pen   28 Units, Subcutaneous, Nightly      carBAMazepine 200 MG tablet  Commonly known as: TEGretol   200 mg, Oral, 2 Times Daily      carvedilol 12.5 MG tablet  Commonly known as: COREG   12.5 mg, Oral, 2 Times Daily With Meals      DOCUSATE SODIUM PO   200 mg, Oral, 2 Times Daily      Glucagon 1 MG injection  Commonly known as: GLUCAGEN   1 mg, Subcutaneous, Once As Needed      lisinopril 40 MG tablet  Commonly known as: PRINIVIL,ZESTRIL   40 mg, Oral, Daily      melatonin 3 MG tablet   3 mg, Oral, Nightly, For insomnia      Mirabegron ER 50 MG tablet  sustained-release 24 hour 24 hr tablet  Commonly known as: MYRBETRIQ   50 mg, Oral, Daily      nitroglycerin 0.4 MG SL tablet  Commonly known as: NITROSTAT   No dose, route, or frequency recorded.      polyethylene glycol 17 GM/SCOOP powder  Commonly known as: MIRALAX   17 g, Oral, Daily PRN      rosuvastatin 20 MG tablet  Commonly known as: CRESTOR   20 mg, Oral, Nightly      ticagrelor 90 MG tablet tablet  Commonly known as: BRILINTA   90 mg, Oral, 2 Times Daily      venlafaxine 37.5 MG tablet  Commonly known as: EFFEXOR   37.5 mg, Oral, 2 Times Daily             Stop These Medications      amLODIPine 10 MG tablet  Commonly known as: NORVASC     ASPIR-81 PO              Discharge Diet:   Diet Instructions       Diet: Cardiac Diets, Diabetic Diets; Healthy Heart (2-3 Na+); Regular Texture (IDDSI 7); Thin (IDDSI 0); Consistent Carbohydrate      Discharge Diet:  Cardiac Diets  Diabetic Diets       Cardiac Diet: Healthy Heart (2-3 Na+)    Texture: Regular Texture (IDDSI 7)    Fluid Consistency: Thin (IDDSI 0)    Diabetic Diet: Consistent Carbohydrate            Activity at Discharge:   Activity Instructions       Activity as Tolerated              Follow-up Appointments:   Future Appointments   Date Time Provider Department Center   1/17/2024  9:15 AM Matt Black MD [facility] None       Test Results Pending at Discharge:   Pending Labs       Order Current Status    Blood Culture - Blood, Arm, Right Preliminary result            The patient has current or prior documentation of left ventricular ejection fraction (LVEF) less than or equal to 40%, or moderate or severely depressed left ventricular systolic function. ; The patient was prescribed or already taking an Angiotensin-Converting Enzyme (ACE) Inhibitor or Angiotensin Receptor Blocker (ARB) and The patient was prescribed or already taking a beta-blocker.            This document has been electronically signed by Duran Kamara MD on August 16, 2023 12:06  CDT      Time: 35 minutes

## 2023-08-16 NOTE — NURSING NOTE
Attempted to call report to [place]. Left my name and the unit phone number after 11 minutes of holding for the nurse.

## 2023-08-17 ENCOUNTER — TELEPHONE (OUTPATIENT)
Dept: CARDIOLOGY | Facility: CLINIC | Age: 83
End: 2023-08-17
Payer: MEDICARE

## 2023-08-17 NOTE — TELEPHONE ENCOUNTER
Attempted to contact patient per Kim Elkins. Patient was unavailable.    ----- Message from MIKA Soriano sent at 8/16/2023  2:47 PM CDT -----  Contact: [phone]  Yes. You can add her on 7 or 8th. THink we still have openings     ----- Message -----  From: Ifrah Baxter MA  Sent: 8/16/2023   2:18 PM CDT  To: MIKA Soriano    Heoumou what would you advise?    ----- Message -----  From: Cristina Newton RegSched Rep  Sent: 8/16/2023   2:14 PM CDT  To: Ifrah Baxter MA      ----- Message -----  From: Cooper Loera  Sent: 8/16/2023   2:06 PM CDT  To: [facility] Cardiology [facility]    Patient needs a 3 week hospital follow up with Kmi Elkins please. Patient is a resident at [facility].

## 2023-08-24 ENCOUNTER — NURSING HOME (OUTPATIENT)
Dept: FAMILY MEDICINE CLINIC | Facility: CLINIC | Age: 83
End: 2023-08-24
Payer: MEDICARE

## 2023-08-24 DIAGNOSIS — E78.2 MIXED HYPERLIPIDEMIA: ICD-10-CM

## 2023-08-24 DIAGNOSIS — I63.9 CEREBROVASCULAR ACCIDENT (CVA), UNSPECIFIED MECHANISM: ICD-10-CM

## 2023-08-24 DIAGNOSIS — I48.11 LONGSTANDING PERSISTENT ATRIAL FIBRILLATION: Primary | ICD-10-CM

## 2023-08-24 DIAGNOSIS — G50.0 TRIGEMINAL NEURALGIA: ICD-10-CM

## 2023-08-24 DIAGNOSIS — I50.22 CHRONIC SYSTOLIC HEART FAILURE: ICD-10-CM

## 2023-08-24 DIAGNOSIS — Z79.4 TYPE 2 DIABETES MELLITUS WITHOUT COMPLICATION, WITH LONG-TERM CURRENT USE OF INSULIN: ICD-10-CM

## 2023-08-24 DIAGNOSIS — R53.81 DEBILITY: ICD-10-CM

## 2023-08-24 DIAGNOSIS — R26.9 GAIT DISORDER: ICD-10-CM

## 2023-08-24 DIAGNOSIS — E11.9 TYPE 2 DIABETES MELLITUS WITHOUT COMPLICATION, WITH LONG-TERM CURRENT USE OF INSULIN: ICD-10-CM

## 2023-08-24 PROCEDURE — 99308 SBSQ NF CARE LOW MDM 20: CPT | Performed by: FAMILY MEDICINE

## 2023-08-25 ENCOUNTER — HOSPITAL ENCOUNTER (OUTPATIENT)
Facility: HOSPITAL | Age: 83
Setting detail: OBSERVATION
Discharge: NURSING, REHAB OR LONG-TERM CARE | End: 2023-08-28
Attending: FAMILY MEDICINE | Admitting: HOSPITALIST
Payer: MEDICARE

## 2023-08-25 ENCOUNTER — APPOINTMENT (OUTPATIENT)
Dept: GENERAL RADIOLOGY | Facility: HOSPITAL | Age: 83
End: 2023-08-25
Payer: MEDICARE

## 2023-08-25 DIAGNOSIS — I48.91 ATRIAL FIBRILLATION WITH RVR: ICD-10-CM

## 2023-08-25 DIAGNOSIS — Z74.09 IMPAIRED FUNCTIONAL MOBILITY, BALANCE, GAIT, AND ENDURANCE: ICD-10-CM

## 2023-08-25 DIAGNOSIS — Z78.9 IMPAIRED MOBILITY AND ADLS: ICD-10-CM

## 2023-08-25 DIAGNOSIS — I50.9 ACUTE ON CHRONIC CONGESTIVE HEART FAILURE, UNSPECIFIED HEART FAILURE TYPE: Primary | ICD-10-CM

## 2023-08-25 DIAGNOSIS — Z74.09 IMPAIRED MOBILITY AND ADLS: ICD-10-CM

## 2023-08-25 LAB
ALBUMIN SERPL-MCNC: 3.5 G/DL (ref 3.5–5.2)
ALBUMIN/GLOB SERPL: 0.9 G/DL
ALP SERPL-CCNC: 169 U/L (ref 39–117)
ALT SERPL W P-5'-P-CCNC: 13 U/L (ref 1–33)
ANION GAP SERPL CALCULATED.3IONS-SCNC: 16 MMOL/L (ref 5–15)
ARTERIAL PATENCY WRIST A: POSITIVE
AST SERPL-CCNC: 27 U/L (ref 1–32)
ATMOSPHERIC PRESS: 748 MMHG
BASE EXCESS BLDA CALC-SCNC: -1.2 MMOL/L (ref 0–2)
BASOPHILS # BLD AUTO: 0.14 10*3/MM3 (ref 0–0.2)
BASOPHILS NFR BLD AUTO: 1.2 % (ref 0–1.5)
BDY SITE: ABNORMAL
BILIRUB SERPL-MCNC: 0.3 MG/DL (ref 0–1.2)
BUN SERPL-MCNC: 33 MG/DL (ref 8–23)
BUN/CREAT SERPL: 20.5 (ref 7–25)
CALCIUM SPEC-SCNC: 8.7 MG/DL (ref 8.6–10.5)
CARBAMAZEPINE SERPL-MCNC: 13.2 MCG/ML (ref 4–12)
CHLORIDE SERPL-SCNC: 102 MMOL/L (ref 98–107)
CK SERPL-CCNC: 38 U/L (ref 20–180)
CO2 SERPL-SCNC: 19 MMOL/L (ref 22–29)
CRE SCREEN PCR: NOT DETECTED
CREAT SERPL-MCNC: 1.61 MG/DL (ref 0.57–1)
DEPRECATED RDW RBC AUTO: 47.7 FL (ref 37–54)
EGFRCR SERPLBLD CKD-EPI 2021: 31.8 ML/MIN/1.73
EOSINOPHIL # BLD AUTO: 0.06 10*3/MM3 (ref 0–0.4)
EOSINOPHIL NFR BLD AUTO: 0.5 % (ref 0.3–6.2)
ERYTHROCYTE [DISTWIDTH] IN BLOOD BY AUTOMATED COUNT: 14.6 % (ref 12.3–15.4)
GAS FLOW AIRWAY: 3 LPM
GEN 5 2HR TROPONIN T REFLEX: 27 NG/L
GLOBULIN UR ELPH-MCNC: 3.8 GM/DL
GLUCOSE BLDC GLUCOMTR-MCNC: 148 MG/DL (ref 70–130)
GLUCOSE SERPL-MCNC: 180 MG/DL (ref 65–99)
HBA1C MFR BLD: 7.6 % (ref 4.8–5.6)
HCO3 BLDA-SCNC: 22.6 MMOL/L (ref 20–26)
HCT VFR BLD AUTO: 33.8 % (ref 34–46.6)
HGB BLD-MCNC: 11.3 G/DL (ref 12–15.9)
HOLD SPECIMEN: NORMAL
IMM GRANULOCYTES # BLD AUTO: 0.08 10*3/MM3 (ref 0–0.05)
IMM GRANULOCYTES NFR BLD AUTO: 0.7 % (ref 0–0.5)
IMP STRAIN: NOT DETECTED
KPC STRAIN: NOT DETECTED
LYMPHOCYTES # BLD AUTO: 1.05 10*3/MM3 (ref 0.7–3.1)
LYMPHOCYTES NFR BLD AUTO: 9 % (ref 19.6–45.3)
Lab: ABNORMAL
MAGNESIUM SERPL-MCNC: 2.3 MG/DL (ref 1.6–2.4)
MAGNESIUM SERPL-MCNC: 2.3 MG/DL (ref 1.6–2.4)
MCH RBC QN AUTO: 30.2 PG (ref 26.6–33)
MCHC RBC AUTO-ENTMCNC: 33.4 G/DL (ref 31.5–35.7)
MCV RBC AUTO: 90.4 FL (ref 79–97)
MODALITY: ABNORMAL
MONOCYTES # BLD AUTO: 0.83 10*3/MM3 (ref 0.1–0.9)
MONOCYTES NFR BLD AUTO: 7.1 % (ref 5–12)
NDM STRAIN: NOT DETECTED
NEUTROPHILS NFR BLD AUTO: 81.5 % (ref 42.7–76)
NEUTROPHILS NFR BLD AUTO: 9.57 10*3/MM3 (ref 1.7–7)
NRBC BLD AUTO-RTO: 0 /100 WBC (ref 0–0.2)
NT-PROBNP SERPL-MCNC: ABNORMAL PG/ML (ref 0–1800)
OXA 48 STRAIN: NOT DETECTED
PCO2 BLDA: 33.7 MM HG (ref 35–45)
PH BLDA: 7.43 PH UNITS (ref 7.35–7.45)
PLATELET # BLD AUTO: 264 10*3/MM3 (ref 140–450)
PMV BLD AUTO: 10.4 FL (ref 6–12)
PO2 BLDA: 106 MM HG (ref 83–108)
POTASSIUM SERPL-SCNC: 5.1 MMOL/L (ref 3.5–5.2)
PROT SERPL-MCNC: 7.3 G/DL (ref 6–8.5)
QT INTERVAL: 346 MS
QTC INTERVAL: 497 MS
RBC # BLD AUTO: 3.74 10*6/MM3 (ref 3.77–5.28)
SAO2 % BLDCOA: 99 % (ref 94–99)
SODIUM SERPL-SCNC: 137 MMOL/L (ref 136–145)
T4 FREE SERPL-MCNC: 1.1 NG/DL (ref 0.93–1.7)
TROPONIN T DELTA: 1 NG/L
TROPONIN T SERPL HS-MCNC: 26 NG/L
TSH SERPL DL<=0.05 MIU/L-ACNC: 1.4 UIU/ML (ref 0.27–4.2)
VENTILATOR MODE: ABNORMAL
VIM STRAIN: NOT DETECTED
WBC NRBC COR # BLD: 11.73 10*3/MM3 (ref 3.4–10.8)
WHOLE BLOOD HOLD COAG: NORMAL

## 2023-08-25 PROCEDURE — 36415 COLL VENOUS BLD VENIPUNCTURE: CPT

## 2023-08-25 PROCEDURE — 83036 HEMOGLOBIN GLYCOSYLATED A1C: CPT | Performed by: HOSPITALIST

## 2023-08-25 PROCEDURE — 83735 ASSAY OF MAGNESIUM: CPT | Performed by: FAMILY MEDICINE

## 2023-08-25 PROCEDURE — 85025 COMPLETE CBC W/AUTO DIFF WBC: CPT | Performed by: FAMILY MEDICINE

## 2023-08-25 PROCEDURE — 82550 ASSAY OF CK (CPK): CPT | Performed by: FAMILY MEDICINE

## 2023-08-25 PROCEDURE — 82948 REAGENT STRIP/BLOOD GLUCOSE: CPT

## 2023-08-25 PROCEDURE — 71045 X-RAY EXAM CHEST 1 VIEW: CPT

## 2023-08-25 PROCEDURE — 25010000002 FUROSEMIDE PER 20 MG: Performed by: FAMILY MEDICINE

## 2023-08-25 PROCEDURE — 93010 ELECTROCARDIOGRAM REPORT: CPT | Performed by: INTERNAL MEDICINE

## 2023-08-25 PROCEDURE — 84443 ASSAY THYROID STIM HORMONE: CPT | Performed by: HOSPITALIST

## 2023-08-25 PROCEDURE — 80156 ASSAY CARBAMAZEPINE TOTAL: CPT | Performed by: FAMILY MEDICINE

## 2023-08-25 PROCEDURE — 63710000001 INSULIN DETEMIR PER 5 UNITS: Performed by: INTERNAL MEDICINE

## 2023-08-25 PROCEDURE — 84439 ASSAY OF FREE THYROXINE: CPT | Performed by: HOSPITALIST

## 2023-08-25 PROCEDURE — 96375 TX/PRO/DX INJ NEW DRUG ADDON: CPT

## 2023-08-25 PROCEDURE — 36600 WITHDRAWAL OF ARTERIAL BLOOD: CPT

## 2023-08-25 PROCEDURE — 80053 COMPREHEN METABOLIC PANEL: CPT | Performed by: FAMILY MEDICINE

## 2023-08-25 PROCEDURE — 87081 CULTURE SCREEN ONLY: CPT | Performed by: HOSPITALIST

## 2023-08-25 PROCEDURE — G0378 HOSPITAL OBSERVATION PER HR: HCPCS

## 2023-08-25 PROCEDURE — 82803 BLOOD GASES ANY COMBINATION: CPT

## 2023-08-25 PROCEDURE — 84484 ASSAY OF TROPONIN QUANT: CPT | Performed by: FAMILY MEDICINE

## 2023-08-25 PROCEDURE — 99284 EMERGENCY DEPT VISIT MOD MDM: CPT

## 2023-08-25 PROCEDURE — 83735 ASSAY OF MAGNESIUM: CPT | Performed by: HOSPITALIST

## 2023-08-25 PROCEDURE — 83880 ASSAY OF NATRIURETIC PEPTIDE: CPT | Performed by: FAMILY MEDICINE

## 2023-08-25 PROCEDURE — 93005 ELECTROCARDIOGRAM TRACING: CPT | Performed by: FAMILY MEDICINE

## 2023-08-25 RX ORDER — FUROSEMIDE 10 MG/ML
80 INJECTION INTRAMUSCULAR; INTRAVENOUS ONCE
Status: COMPLETED | OUTPATIENT
Start: 2023-08-25 | End: 2023-08-25

## 2023-08-25 RX ORDER — VENLAFAXINE 37.5 MG/1
37.5 TABLET ORAL 2 TIMES DAILY WITH MEALS
Status: DISCONTINUED | OUTPATIENT
Start: 2023-08-25 | End: 2023-08-28 | Stop reason: HOSPADM

## 2023-08-25 RX ORDER — LISINOPRIL 20 MG/1
40 TABLET ORAL DAILY
Status: DISCONTINUED | OUTPATIENT
Start: 2023-08-25 | End: 2023-08-28 | Stop reason: HOSPADM

## 2023-08-25 RX ORDER — GLUCAGON 1 MG/ML
1 KIT INJECTION
Status: DISCONTINUED | OUTPATIENT
Start: 2023-08-25 | End: 2023-08-28 | Stop reason: HOSPADM

## 2023-08-25 RX ORDER — AMOXICILLIN 250 MG
2 CAPSULE ORAL 2 TIMES DAILY
Status: DISCONTINUED | OUTPATIENT
Start: 2023-08-25 | End: 2023-08-28 | Stop reason: HOSPADM

## 2023-08-25 RX ORDER — DEXTROSE MONOHYDRATE 25 G/50ML
25 INJECTION, SOLUTION INTRAVENOUS
Status: DISCONTINUED | OUTPATIENT
Start: 2023-08-25 | End: 2023-08-28 | Stop reason: HOSPADM

## 2023-08-25 RX ORDER — ONDANSETRON 4 MG/1
4 TABLET, FILM COATED ORAL EVERY 6 HOURS PRN
Status: DISCONTINUED | OUTPATIENT
Start: 2023-08-25 | End: 2023-08-28 | Stop reason: HOSPADM

## 2023-08-25 RX ORDER — NICOTINE POLACRILEX 4 MG
15 LOZENGE BUCCAL
Status: DISCONTINUED | OUTPATIENT
Start: 2023-08-25 | End: 2023-08-28 | Stop reason: HOSPADM

## 2023-08-25 RX ORDER — NITROGLYCERIN 0.4 MG/1
0.4 TABLET SUBLINGUAL
Status: DISCONTINUED | OUTPATIENT
Start: 2023-08-25 | End: 2023-08-28 | Stop reason: HOSPADM

## 2023-08-25 RX ORDER — CALCIUM CARBONATE 500 MG/1
2 TABLET, CHEWABLE ORAL 2 TIMES DAILY PRN
Status: DISCONTINUED | OUTPATIENT
Start: 2023-08-25 | End: 2023-08-28 | Stop reason: HOSPADM

## 2023-08-25 RX ORDER — ROSUVASTATIN CALCIUM 10 MG/1
20 TABLET, COATED ORAL NIGHTLY
Status: DISCONTINUED | OUTPATIENT
Start: 2023-08-25 | End: 2023-08-28 | Stop reason: HOSPADM

## 2023-08-25 RX ORDER — FUROSEMIDE 10 MG/ML
40 INJECTION INTRAMUSCULAR; INTRAVENOUS EVERY 12 HOURS
Status: DISCONTINUED | OUTPATIENT
Start: 2023-08-26 | End: 2023-08-28 | Stop reason: HOSPADM

## 2023-08-25 RX ORDER — POLYETHYLENE GLYCOL 3350 17 G/17G
17 POWDER, FOR SOLUTION ORAL DAILY PRN
Status: DISCONTINUED | OUTPATIENT
Start: 2023-08-25 | End: 2023-08-28 | Stop reason: HOSPADM

## 2023-08-25 RX ORDER — SODIUM CHLORIDE 9 MG/ML
40 INJECTION, SOLUTION INTRAVENOUS AS NEEDED
Status: DISCONTINUED | OUTPATIENT
Start: 2023-08-25 | End: 2023-08-28 | Stop reason: HOSPADM

## 2023-08-25 RX ORDER — ONDANSETRON 2 MG/ML
4 INJECTION INTRAMUSCULAR; INTRAVENOUS EVERY 6 HOURS PRN
Status: DISCONTINUED | OUTPATIENT
Start: 2023-08-25 | End: 2023-08-28 | Stop reason: HOSPADM

## 2023-08-25 RX ORDER — INSULIN ASPART 100 [IU]/ML
0-14 INJECTION, SOLUTION INTRAVENOUS; SUBCUTANEOUS
Status: DISCONTINUED | OUTPATIENT
Start: 2023-08-25 | End: 2023-08-28 | Stop reason: HOSPADM

## 2023-08-25 RX ORDER — BISACODYL 10 MG
10 SUPPOSITORY, RECTAL RECTAL DAILY PRN
Status: DISCONTINUED | OUTPATIENT
Start: 2023-08-25 | End: 2023-08-28 | Stop reason: HOSPADM

## 2023-08-25 RX ORDER — LORAZEPAM 0.5 MG/1
0.5 TABLET ORAL EVERY 8 HOURS PRN
Status: DISCONTINUED | OUTPATIENT
Start: 2023-08-25 | End: 2023-08-28 | Stop reason: HOSPADM

## 2023-08-25 RX ORDER — FUROSEMIDE 10 MG/ML
40 INJECTION INTRAMUSCULAR; INTRAVENOUS EVERY 12 HOURS
Status: DISCONTINUED | OUTPATIENT
Start: 2023-08-25 | End: 2023-08-25

## 2023-08-25 RX ORDER — ACETAMINOPHEN 325 MG/1
650 TABLET ORAL EVERY 4 HOURS PRN
Status: DISCONTINUED | OUTPATIENT
Start: 2023-08-25 | End: 2023-08-28 | Stop reason: HOSPADM

## 2023-08-25 RX ORDER — CARVEDILOL 12.5 MG/1
12.5 TABLET ORAL 2 TIMES DAILY WITH MEALS
Status: DISCONTINUED | OUTPATIENT
Start: 2023-08-25 | End: 2023-08-28 | Stop reason: HOSPADM

## 2023-08-25 RX ORDER — LANOLIN ALCOHOL/MO/W.PET/CERES
3 CREAM (GRAM) TOPICAL NIGHTLY
Status: DISCONTINUED | OUTPATIENT
Start: 2023-08-25 | End: 2023-08-28 | Stop reason: HOSPADM

## 2023-08-25 RX ORDER — SODIUM CHLORIDE 0.9 % (FLUSH) 0.9 %
10 SYRINGE (ML) INJECTION AS NEEDED
Status: DISCONTINUED | OUTPATIENT
Start: 2023-08-25 | End: 2023-08-28 | Stop reason: HOSPADM

## 2023-08-25 RX ORDER — DILTIAZEM HYDROCHLORIDE 5 MG/ML
10 INJECTION INTRAVENOUS ONCE
Status: COMPLETED | OUTPATIENT
Start: 2023-08-25 | End: 2023-08-25

## 2023-08-25 RX ORDER — SODIUM CHLORIDE 0.9 % (FLUSH) 0.9 %
10 SYRINGE (ML) INJECTION EVERY 12 HOURS SCHEDULED
Status: DISCONTINUED | OUTPATIENT
Start: 2023-08-25 | End: 2023-08-28 | Stop reason: HOSPADM

## 2023-08-25 RX ORDER — BISACODYL 5 MG/1
5 TABLET, DELAYED RELEASE ORAL DAILY PRN
Status: DISCONTINUED | OUTPATIENT
Start: 2023-08-25 | End: 2023-08-28 | Stop reason: HOSPADM

## 2023-08-25 RX ORDER — DILTIAZEM HYDROCHLORIDE 60 MG/1
60 TABLET, FILM COATED ORAL EVERY 8 HOURS SCHEDULED
Status: DISCONTINUED | OUTPATIENT
Start: 2023-08-25 | End: 2023-08-28 | Stop reason: HOSPADM

## 2023-08-25 RX ADMIN — DILTIAZEM HYDROCHLORIDE 60 MG: 60 TABLET, FILM COATED ORAL at 23:31

## 2023-08-25 RX ADMIN — TICAGRELOR 90 MG: 90 TABLET ORAL at 20:51

## 2023-08-25 RX ADMIN — Medication 10 ML: at 20:52

## 2023-08-25 RX ADMIN — VENLAFAXINE 37.5 MG: 37.5 TABLET ORAL at 16:25

## 2023-08-25 RX ADMIN — INSULIN DETEMIR 15 UNITS: 100 INJECTION, SOLUTION SUBCUTANEOUS at 21:45

## 2023-08-25 RX ADMIN — APIXABAN 5 MG: 5 TABLET, FILM COATED ORAL at 16:25

## 2023-08-25 RX ADMIN — FUROSEMIDE 80 MG: 10 INJECTION, SOLUTION INTRAMUSCULAR; INTRAVENOUS at 12:38

## 2023-08-25 RX ADMIN — DILTIAZEM HYDROCHLORIDE 60 MG: 60 TABLET, FILM COATED ORAL at 16:25

## 2023-08-25 RX ADMIN — DOCUSATE SODIUM 50 MG AND SENNOSIDES 8.6 MG 2 TABLET: 8.6; 5 TABLET, FILM COATED ORAL at 20:52

## 2023-08-25 RX ADMIN — CARVEDILOL 12.5 MG: 12.5 TABLET, FILM COATED ORAL at 16:25

## 2023-08-25 RX ADMIN — Medication 10 ML: at 15:14

## 2023-08-25 RX ADMIN — MELATONIN 3 MG: 3 TAB ORAL at 20:52

## 2023-08-25 RX ADMIN — ROSUVASTATIN CALCIUM 20 MG: 10 TABLET, FILM COATED ORAL at 20:52

## 2023-08-25 RX ADMIN — LISINOPRIL 40 MG: 20 TABLET ORAL at 16:25

## 2023-08-25 RX ADMIN — DILTIAZEM HYDROCHLORIDE 10 MG: 5 INJECTION INTRAVENOUS at 12:37

## 2023-08-25 NOTE — ED NOTES
Nursing report ED to floor  Magy Barton  82 y.o.  female    HPI:   Chief Complaint   Patient presents with    Shortness of Breath       Admitting doctor:   Reid Escobar DO    Consulting provider(s):  Consults       Date and Time Order Name Status Description    8/25/2023 12:31 PM Hospitalist (on-call MD unless specified)      8/11/2023  7:24 AM Inpatient Urology Consult Completed              Admitting diagnosis:   The primary encounter diagnosis was Acute on chronic congestive heart failure, unspecified heart failure type. A diagnosis of Atrial fibrillation with RVR was also pertinent to this visit.    Code status:   Current Code Status       Date Active Code Status Order ID Comments User Context       8/25/2023 1249 CPR (Attempt to Resuscitate) [id number]  Reid Escobar DO ED        Question Answer    Code Status (Patient has no pulse and is not breathing) CPR (Attempt to Resuscitate)    Medical Interventions (Patient has pulse or is breathing) Full Support    Level Of Support Discussed With Patient                    Allergies:   Sulfa antibiotics, Atorvastatin, Levofloxacin, Meclizine, and Morphine    Intake and Output  No intake or output data in the 24 hours ending 08/25/23 1409    Weight:       08/25/23  0730   Weight: 82.6 kg (182 lb)       Most recent vitals:   Vitals:    08/25/23 1245 08/25/23 1300 08/25/23 1404 08/25/23 1407   BP:  (!) 174/109 (!) 161/116    BP Location:       Patient Position:       Pulse: 92 102 114    Resp:       Temp:       TempSrc:       SpO2: 94% 94%  96%   Weight:       Height:         Oxygen Therapy: 3L Nasal Cannula    Active LDAs/IV Access:   Lines, Drains & Airways       Active LDAs       Name Placement date Placement time Site Days    Peripheral IV 08/25/23 0747 Posterior;Right Hand 08/25/23  0747  Hand  less than 1    Peripheral IV 08/25/23 0750 Anterior;Left Forearm 08/25/23  0750  Forearm  less than 1                    Labs (abnormal labs have a star):    Labs Reviewed   COMPREHENSIVE METABOLIC PANEL - Abnormal; Notable for the following components:       Result Value    Glucose 180 (*)     BUN 33 (*)     Creatinine 1.61 (*)     CO2 19.0 (*)     Alkaline Phosphatase 169 (*)     Anion Gap 16.0 (*)     eGFR 31.8 (*)     All other components within normal limits    Narrative:     GFR Normal >60  Chronic Kidney Disease <60  Kidney Failure <15    The GFR formula is only valid for adults with stable renal function between ages 18 and 70.   CBC WITH AUTO DIFFERENTIAL - Abnormal; Notable for the following components:    WBC 11.73 (*)     RBC 3.74 (*)     Hemoglobin 11.3 (*)     Hematocrit 33.8 (*)     Neutrophil % 81.5 (*)     Lymphocyte % 9.0 (*)     Immature Grans % 0.7 (*)     Neutrophils, Absolute 9.57 (*)     Immature Grans, Absolute 0.08 (*)     All other components within normal limits   BNP (IN-HOUSE) - Abnormal; Notable for the following components:    proBNP 11,536.0 (*)     All other components within normal limits    Narrative:     Among patients with dyspnea, NT-proBNP is highly sensitive for the detection of acute congestive heart failure. In addition NT-proBNP of <300 pg/ml effectively rules out acute congestive heart failure with 99% negative predictive value.     TROPONIN - Abnormal; Notable for the following components:    HS Troponin T 26 (*)     All other components within normal limits    Narrative:     High Sensitive Troponin T Reference Range:  <10.0 ng/L- Negative Female for AMI  <15.0 ng/L- Negative Male for AMI  >=10 - Abnormal Female indicating possible myocardial injury.  >=15 - Abnormal Male indicating possible myocardial injury.   Clinicians would have to utilize clinical acumen, EKG, Troponin, and serial changes to determine if it is an Acute Myocardial Infarction or myocardial injury due to an underlying chronic condition.        CARBAMAZEPINE LEVEL, TOTAL - Abnormal; Notable for the following components:    Carbamazepine Level 13.2 (*)      All other components within normal limits   BLOOD GAS, ARTERIAL - Abnormal; Notable for the following components:    pCO2, Arterial 33.7 (*)     Base Excess, Arterial -1.2 (*)     All other components within normal limits   HIGH SENSITIVITIY TROPONIN T 2HR - Abnormal; Notable for the following components:    HS Troponin T 27 (*)     All other components within normal limits    Narrative:     High Sensitive Troponin T Reference Range:  <10.0 ng/L- Negative Female for AMI  <15.0 ng/L- Negative Male for AMI  >=10 - Abnormal Female indicating possible myocardial injury.  >=15 - Abnormal Male indicating possible myocardial injury.   Clinicians would have to utilize clinical acumen, EKG, Troponin, and serial changes to determine if it is an Acute Myocardial Infarction or myocardial injury due to an underlying chronic condition.        HEMOGLOBIN A1C - Abnormal; Notable for the following components:    Hemoglobin A1C 7.60 (*)     All other components within normal limits    Narrative:     Hemoglobin A1C Ranges:    Increased Risk for Diabetes  5.7% to 6.4%  Diabetes                     >= 6.5%  Diabetic Goal                < 7.0%   CK - Normal   MAGNESIUM - Normal   MAGNESIUM - Normal   TSH+FREE T4 - Normal   BLOOD GAS, ARTERIAL   POCT GLUCOSE FINGERSTICK   POCT GLUCOSE FINGERSTICK   POCT GLUCOSE FINGERSTICK   CBC AND DIFFERENTIAL    Narrative:     The following orders were created for panel order CBC & Differential.  Procedure                               Abnormality         Status                     ---------                               -----------         ------                     CBC Auto Differential[[id number]]        Abnormal            Final result                 Please view results for these tests on the individual orders.   EXTRA TUBES    Narrative:     The following orders were created for panel order Extra Tubes.  Procedure                               Abnormality         Status                      ---------                               -----------         ------                     Gold Top - SST[[id number]]                                   Final result               Light Blue Top[[id number]]                                   Final result                 Please view results for these tests on the individual orders.   GOLD TOP - SST   LIGHT BLUE TOP       Meds given in ED:   Medications   venlafaxine (EFFEXOR) tablet 37.5 mg (has no administration in time range)   ticagrelor (BRILINTA) tablet 90 mg (has no administration in time range)   rosuvastatin (CRESTOR) tablet 20 mg (has no administration in time range)   melatonin tablet 3 mg (has no administration in time range)   lisinopril (PRINIVIL,ZESTRIL) tablet 40 mg (has no administration in time range)   insulin detemir (LEVEMIR) injection 28 Units (has no administration in time range)   dilTIAZem (CARDIZEM) tablet 60 mg (has no administration in time range)   carvedilol (COREG) tablet 12.5 mg (has no administration in time range)   apixaban (ELIQUIS) tablet 5 mg (has no administration in time range)   sodium chloride 0.9 % flush 10 mL (has no administration in time range)   sodium chloride 0.9 % flush 10 mL (has no administration in time range)   sodium chloride 0.9 % infusion 40 mL (has no administration in time range)   sennosides-docusate (PERICOLACE) 8.6-50 MG per tablet 2 tablet (has no administration in time range)     And   polyethylene glycol (MIRALAX) packet 17 g (has no administration in time range)     And   bisacodyl (DULCOLAX) EC tablet 5 mg (has no administration in time range)     And   bisacodyl (DULCOLAX) suppository 10 mg (has no administration in time range)   nitroglycerin (NITROSTAT) SL tablet 0.4 mg (has no administration in time range)   Potassium Replacement - Follow Nurse / BPA Driven Protocol (has no administration in time range)   Magnesium Cardiology Dose Replacement - Follow Nurse / BPA Driven Protocol (has no administration in  time range)   Phosphorus Replacement - Follow Nurse / BPA Driven Protocol (has no administration in time range)   Calcium Replacement - Follow Nurse / BPA Driven Protocol (has no administration in time range)   acetaminophen (TYLENOL) tablet 650 mg (has no administration in time range)   LORazepam (ATIVAN) tablet 0.5 mg (has no administration in time range)   ondansetron (ZOFRAN) tablet 4 mg (has no administration in time range)     Or   ondansetron (ZOFRAN) injection 4 mg (has no administration in time range)   calcium carbonate (TUMS) chewable tablet 500 mg (200 mg elemental) (has no administration in time range)   dextrose (GLUTOSE) oral gel 15 g (has no administration in time range)   dextrose (D50W) (25 g/50 mL) IV injection 25 g (has no administration in time range)   glucagon HCl (Diagnostic) injection 1 mg (has no administration in time range)   Insulin Aspart (novoLOG) injection 0-14 Units (has no administration in time range)   furosemide (LASIX) injection 40 mg (has no administration in time range)   furosemide (LASIX) injection 80 mg (80 mg Intravenous Given 8/25/23 1238)   dilTIAZem (CARDIZEM) injection 10 mg (10 mg Intravenous Given 8/25/23 1237)           NIH Stroke Scale:       Isolation/Infection(s):  No active isolations   No active infections     COVID Testing  Collected No  Resulted N/A    Nursing report ED to floor:  Mental status: Alert; Confusion  Ambulatory status: x1  Precautions: Falls    ED nurse phone extentsion- [phone]

## 2023-08-25 NOTE — H&P
[facility] Medicine  HISTORY AND PHYSICAL      Date of Admission: 8/25/2023  Primary Care Physician: Sean Briones MD    Subjective     Chief Complaint:   Shortness of breath  History of Present Illness  The patient she is a 82-year-old female admitted to the hospital via the ER.  The patient comes in on day of admission complaining of shortness of breath.  Seen and evaluated in the ER by Dr. Golden.  Chest x-ray was completed which did show central pulmonary congestion.  Lasix was given in the ER with good results with urinary output.  EKG does show atrial fibrillation with RVR.  Patient was given dose of Cardizem IV 10 mg which did help the rate.  The patient does take Cardizem p.o. at home we will continue during the admission.  Echocardiogram will be ordered for evaluation for ejection fraction and structure of the heart and proper function.  The patient has no fevers or chills.  No chest pain.  She is complaining of some heart palpitations and shortness of breath only.  Troponin is noted to be mildly elevated initially at 26-second is 27 and her troponin T delta is 1.  These numbers are probably related to demand ischemia from CHF exacerbation and atrial fibrillation.        Review of Systems   Constitutional:  Positive for fatigue. Negative for chills and fever.   HENT: Negative.  Negative for congestion.    Eyes: Negative.    Respiratory:  Positive for shortness of breath. Negative for cough.    Cardiovascular:  Positive for palpitations. Negative for chest pain.   Gastrointestinal: Negative.  Negative for abdominal distention, abdominal pain, nausea and vomiting.   Endocrine: Negative.    Genitourinary: Negative.    Musculoskeletal: Negative.    Skin: Negative.    Neurological:  Positive for weakness. Negative for dizziness.   Hematological: Negative.    Psychiatric/Behavioral: Negative.        Otherwise complete ROS reviewed and negative except as  mentioned in the HPI.    Past Medical History:   Past Medical History:   Diagnosis Date    Cancer 2009    leukemia    Coronary artery disease     Diabetes mellitus     Hypertension     Injury of back     Myocardial infarction     Stroke      Past Surgical History:  Past Surgical History:   Procedure Laterality Date    CARDIAC CATHETERIZATION Left 9/18/2022    Procedure: Left Heart Cath;  Surgeon: Ayaz Wild DO;  Location: [facility] CATH INVASIVE LOCATION;  Service: Cardiology;  Laterality: Left;    CARDIAC CATHETERIZATION N/A 9/19/2022    Procedure: PERCUTANEOUS CORONARY INTERVENTION;  Surgeon: Ayaz Wild DO;  Location: [facility] CATH INVASIVE LOCATION;  Service: Cardiology;  Laterality: N/A;    CATARACT EXTRACTION      TRIGEMINAL NERVE DECOMPRESSION      TUBAL ABDOMINAL LIGATION      UVULOPALATOPHARYNGOPLASTY       Social History:  reports that she has never smoked. She has never used smokeless tobacco. She reports that she does not drink alcohol and does not use drugs.    Family History: family history is not on file.   Patient unable to provide this information poor historian    Allergies:  Allergies   Allergen Reactions    Sulfa Antibiotics Itching and Rash     Patient states she had the Worse yeast infection ever.  Reaction: rash  Patient states she had the Worse yeast infection ever.      Atorvastatin      Memory    Levofloxacin     Meclizine     Morphine        Medications:  Prior to Admission medications    Medication Sig Start Date End Date Taking? Authorizing Provider   acetaminophen (TYLENOL) 325 MG tablet Take 2 tablets by mouth Every 6 (Six) Hours As Needed for Mild Pain.   Yes ProviderMarsha MD   albuterol (PROVENTIL) (2.5 MG/3ML) 0.083% nebulizer solution Take 2.5 mg by nebulization Every 6 (Six) Hours As Needed for Wheezing.   Yes ProviderMarsha MD   apixaban (ELIQUIS) 5 MG tablet tablet Take 1 tablet by mouth 2 (Two) Times a Day. 8/16/23  Yes Duran Kamara MD   carBAMazepine  (TEGretol) 200 MG tablet Take 1 tablet by mouth 2 (Two) Times a Day. 7/25/16  Yes Marsha Norman MD   carvedilol (COREG) 12.5 MG tablet Take 1 tablet by mouth 2 (Two) Times a Day With Meals. 9/20/22  Yes Ayaz Wild DO   dilTIAZem (CARDIZEM) 60 MG tablet Take 1 tablet by mouth Every 8 (Eight) Hours. 8/16/23  Yes Duran Kamara MD   DOCUSATE SODIUM PO Take 200 mg by mouth 2 (Two) Times a Day.   Yes Marsha Norman MD   Glucagon (GLUCAGEN) 1 MG injection Inject 1 mg under the skin into the appropriate area as directed 1 (One) Time As Needed for Low Blood Sugar.   Yes Marsha Norman MD   Insulin Glargine (BASAGLAR KWIKPEN) 100 UNIT/ML injection pen Inject 28 Units under the skin into the appropriate area as directed Every Night.   Yes Marsha Norman MD   lisinopril (PRINIVIL,ZESTRIL) 40 MG tablet Take 1 tablet by mouth Daily. 2/15/17  Yes Marsha Norman MD   melatonin 3 MG tablet Take 1 tablet by mouth Every Night. For insomnia   Yes Marsha Norman MD   Mirabegron ER (MYRBETRIQ) 50 MG tablet sustained-release 24 hour 24 hr tablet Take 50 mg by mouth Daily.   Yes Marsha Norman MD   nitroglycerin (NITROSTAT) 0.4 MG SL tablet  1/10/23  Yes Marsha Norman MD   polyethylene glycol (MIRALAX) 17 GM/SCOOP powder Take 17 g by mouth Daily As Needed.   Yes Marsha Norman MD   rosuvastatin (CRESTOR) 20 MG tablet Take 1 tablet by mouth Every Night.  Patient taking differently: Take 2 tablets by mouth Every Night. 9/20/22  Yes Ayaz Wild DO   ticagrelor (BRILINTA) 90 MG tablet tablet Take 1 tablet by mouth 2 (Two) Times a Day. 9/20/22  Yes Ayaz Wild DO   venlafaxine (EFFEXOR) 37.5 MG tablet Take 1 tablet by mouth 2 (Two) Times a Day.   Yes Marsha Norman MD     I have utilized all available immediate resources to obtain, update, and review the patient's current medications.    Objective     Vital Signs: BP (!) 174/109   Pulse 102   Temp 97.9 °F  "(36.6 °C) (Oral)   Resp 24   Ht 157.5 cm (62\")   Wt 82.6 kg (182 lb)   SpO2 94%   BMI 33.29 kg/m²   Physical Exam  Vitals and nursing note reviewed.   Constitutional:       Appearance: She is obese.   HENT:      Head: Normocephalic and atraumatic.      Right Ear: External ear normal.      Left Ear: External ear normal.      Nose: Nose normal.      Mouth/Throat:      Mouth: Mucous membranes are moist.      Pharynx: Oropharynx is clear.   Eyes:      General: No scleral icterus.     Extraocular Movements: Extraocular movements intact.      Conjunctiva/sclera: Conjunctivae normal.      Pupils: Pupils are equal, round, and reactive to light.   Neck:      Comments: Positive JVD  Cardiovascular:      Rate and Rhythm: Normal rate and regular rhythm.      Heart sounds: No murmur heard.  Pulmonary:      Effort: Pulmonary effort is normal.      Breath sounds: Rales present.      Comments: Rales at the bases bilateral  Abdominal:      General: Bowel sounds are normal.      Palpations: Abdomen is soft.   Musculoskeletal:         General: Normal range of motion.      Cervical back: Normal range of motion and neck supple.      Comments: No edema bilateral lower extremities.  Good capillary refill.  Turgor adequate   Skin:     General: Skin is warm and dry.      Capillary Refill: Capillary refill takes less than 2 seconds.   Neurological:      General: No focal deficit present.      Mental Status: She is alert. Mental status is at baseline.      Motor: Weakness present.   Psychiatric:         Mood and Affect: Mood normal.         Behavior: Behavior normal.            Results Reviewed:  Lab Results (last 24 hours)       Procedure Component Value Units Date/Time    High Sensitivity Troponin T 2Hr [[id number]]  (Abnormal) Collected: 08/25/23 1119    Specimen: Blood Updated: 08/25/23 1152     HS Troponin T 27 ng/L      Troponin T Delta 1 ng/L     Narrative:      High Sensitive Troponin T Reference Range:  <10.0 ng/L- Negative " Female for AMI  <15.0 ng/L- Negative Male for AMI  >=10 - Abnormal Female indicating possible myocardial injury.  >=15 - Abnormal Male indicating possible myocardial injury.   Clinicians would have to utilize clinical acumen, EKG, Troponin, and serial changes to determine if it is an Acute Myocardial Infarction or myocardial injury due to an underlying chronic condition.         High Sensitivity Troponin T [[id number]]  (Abnormal) Collected: 08/25/23 0748    Specimen: Blood Updated: 08/25/23 1044     HS Troponin T 26 ng/L      Comment: Specimen hemolyzed.  Results may be affected.       Narrative:      High Sensitive Troponin T Reference Range:  <10.0 ng/L- Negative Female for AMI  <15.0 ng/L- Negative Male for AMI  >=10 - Abnormal Female indicating possible myocardial injury.  >=15 - Abnormal Male indicating possible myocardial injury.   Clinicians would have to utilize clinical acumen, EKG, Troponin, and serial changes to determine if it is an Acute Myocardial Infarction or myocardial injury due to an underlying chronic condition.         Comprehensive Metabolic Panel [[id number]]  (Abnormal) Collected: 08/25/23 0748    Specimen: Blood Updated: 08/25/23 1044     Glucose 180 mg/dL      BUN 33 mg/dL      Creatinine 1.61 mg/dL      Sodium 137 mmol/L      Potassium 5.1 mmol/L      Comment: Slight hemolysis detected by analyzer. Results may be affected.        Chloride 102 mmol/L      CO2 19.0 mmol/L      Calcium 8.7 mg/dL      Total Protein 7.3 g/dL      Albumin 3.5 g/dL      ALT (SGPT) 13 U/L      AST (SGOT) 27 U/L      Comment: Slight hemolysis detected by analyzer. Results may be affected.        Alkaline Phosphatase 169 U/L      Total Bilirubin 0.3 mg/dL      Globulin 3.8 gm/dL      A/G Ratio 0.9 g/dL      BUN/Creatinine Ratio 20.5     Anion Gap 16.0 mmol/L      eGFR 31.8 mL/min/1.73     Narrative:      GFR Normal >60  Chronic Kidney Disease <60  Kidney Failure <15    The GFR formula is only valid for adults with  stable renal function between ages 18 and 70.    Magnesium [[id number]]  (Normal) Collected: 08/25/23 0748    Specimen: Blood Updated: 08/25/23 1044     Magnesium 2.3 mg/dL     CK [[id number]]  (Normal) Collected: 08/25/23 0748    Specimen: Blood Updated: 08/25/23 1040     Creatine Kinase 38 U/L     BNP [[id number]]  (Abnormal) Collected: 08/25/23 0748    Specimen: Blood Updated: 08/25/23 1037     proBNP 11,536.0 pg/mL     Narrative:      Among patients with dyspnea, NT-proBNP is highly sensitive for the detection of acute congestive heart failure. In addition NT-proBNP of <300 pg/ml effectively rules out acute congestive heart failure with 99% negative predictive value.      Extra Tubes [[id number]] Collected: 08/25/23 0748    Specimen: Blood, Venous Line Updated: 08/25/23 0900    Narrative:      The following orders were created for panel order Extra Tubes.  Procedure                               Abnormality         Status                     ---------                               -----------         ------                     Gold Top - SST[[id number]]                                   Final result               Light Blue Top[[id number]]                                   Final result                 Please view results for these tests on the individual orders.    Light Blue Top [[id number]] Collected: 08/25/23 0748    Specimen: Blood Updated: 08/25/23 0900     Extra Tube Hold for add-ons.     Comment: Auto resulted       Gold Top - SST [[id number]] Collected: 08/25/23 0748    Specimen: Blood Updated: 08/25/23 0900     Extra Tube Hold for add-ons.     Comment: Auto resulted.       Carbamazepine Level, Total [[id number]]  (Abnormal) Collected: 08/25/23 0748    Specimen: Blood Updated: 08/25/23 0825     Carbamazepine Level 13.2 mcg/mL     Blood Gas, Arterial - [[id number]]  (Abnormal) Collected: 08/25/23 0805    Specimen: Arterial Blood Updated: 08/25/23 0802     Site Left Radial     Marty's Test Positive     pH, Arterial  7.434 pH units      pCO2, Arterial 33.7 mm Hg      Comment: 84 Value below reference range        pO2, Arterial 106.0 mm Hg      HCO3, Arterial 22.6 mmol/L      Base Excess, Arterial -1.2 mmol/L      Comment: 84 Value below reference range        O2 Saturation, Arterial 99.0 %      Barometric Pressure for Blood Gas 748 mmHg      Modality Nasal Cannula     Flow Rate 3.0 lpm      Ventilator Mode NA     Collected by RT     Comment: Meter: [id number]     :  [id number]       CBC & Differential [[id number]]  (Abnormal) Collected: 08/25/23 0748    Specimen: Blood Updated: 08/25/23 0759    Narrative:      The following orders were created for panel order CBC & Differential.  Procedure                               Abnormality         Status                     ---------                               -----------         ------                     CBC Auto Differential[[id number]]        Abnormal            Final result                 Please view results for these tests on the individual orders.    CBC Auto Differential [[id number]]  (Abnormal) Collected: 08/25/23 0748    Specimen: Blood Updated: 08/25/23 0759     WBC 11.73 10*3/mm3      RBC 3.74 10*6/mm3      Hemoglobin 11.3 g/dL      Hematocrit 33.8 %      MCV 90.4 fL      MCH 30.2 pg      MCHC 33.4 g/dL      RDW 14.6 %      RDW-SD 47.7 fl      MPV 10.4 fL      Platelets 264 10*3/mm3      Neutrophil % 81.5 %      Lymphocyte % 9.0 %      Monocyte % 7.1 %      Eosinophil % 0.5 %      Basophil % 1.2 %      Immature Grans % 0.7 %      Neutrophils, Absolute 9.57 10*3/mm3      Lymphocytes, Absolute 1.05 10*3/mm3      Monocytes, Absolute 0.83 10*3/mm3      Eosinophils, Absolute 0.06 10*3/mm3      Basophils, Absolute 0.14 10*3/mm3      Immature Grans, Absolute 0.08 10*3/mm3      nRBC 0.0 /100 WBC           Imaging Results (Last 24 Hours)       Procedure Component Value Units Date/Time    XR Chest 1 View [[id number]] Collected: 08/25/23 0750     Updated: 08/25/23 0754     Narrative:      Portable upright AP view of the chest    HISTORY:  Shortness of breath.    COMPARISON:  01/09/2023    FINDINGS:  Diffuse interstitial prominence suggesting pulmonary edema.    Blunting of the costophrenic angles could reflect trace effusions.  No  pneumothorax is seen.    The heart is mildly prominent but stable.  There does appear to be some central  vascular congestion.    Moderate arthritis at the AC joints and right shoulder.              I have personally reviewed and interpreted the radiology studies and ECG obtained at time of admission.     Assessment / Plan     Assessment:   Active Hospital Problems    Diagnosis     **Atrial fibrillation with RVR      Impression/plan  CHF exacerbation systolic.  Admit to hospital  Lasix twice a day  Check echocardiogram  Cardiac monitoring    Atrial fibrillation with RVR  Heart rate in the low 100s.  Cardizem given in ER  Continue p.o. Cardizem.  Eliquis for anticoagulation  Check TSH    Hypertension  Continue Coreg Zestril, and Cardizem    Diabetes mellitus type 2  Check A1c  ADA cardiac diet  Sliding scale  Continue insulin glargine      Coronary artery disease  Continue Brilinta, Coreg, Crestor    Hyperlipidemia  Continue rosuvastatin  Tolerating medication without any adverse side effects    Chronic kidney disease stage IIIb  BUN is 33 creatinine 1.61 and GFR is 31.8  We will continue to monitor kidney function.    Depression/anxiety  Continue Effexor    Deconditioning  PT OT    CODE STATUS full code  DVT prophylaxis Eliquis         Medical Decision Making  Number and Complexity of problems: 4 complex medical problem  Differential Diagnosis: CHF exacerbation versus pneumonia    Conditions and Status:        Condition is unchanged.     [facility] Data  External documents reviewed: Cardiology heart  My EKG interpretation: A-fib with RVR  My plain film interpretation:  Chest x-ray completed 8/25/2023  Central vascular congestion.  Compatible with CHF  Tests  considered but not ordered: None     Decision rules/scores evaluated (example [id number], Wells, etc): 5     Treatment Plan  As above    Care Planning  Shared decision making: Patient updated on current status and informed of proposed care plan; is in agreement with plan  Code status and discussions: Full code    Disposition  Social Determinants of Health that impact treatment or disposition: N/A  I expect the patient to be discharged to home in 48 to 72 hours    I confirmed that the patient's Advance Care Plan is present, code status is documented, or surrogate decision maker is listed in the patient's medical record.       The patient's surrogate decision maker is unknown    I discussed my findings and recommendations with the the ER physician and the patient and they agree to treatment plan    Estimated length of stay is 48 to 72 hours    The patient was seen and examined by me on     Electronically signed by Reid Escobar DO, 08/25/23, 13:10 CDT.

## 2023-08-25 NOTE — PLAN OF CARE
Problem: Adult Inpatient Plan of Care  Goal: Plan of Care Review  Outcome: Ongoing, Progressing  Goal: Absence of Hospital-Acquired Illness or Injury  Outcome: Ongoing, Progressing  Intervention: Identify and Manage Fall Risk  Description: Perform standard risk assessment on admission using a validated tool or comprehensive approach appropriate to the patient; reassess fall risk frequently, with change in status or transfer to another level of care.  Communicate fall injury risk to interprofessional healthcare team.  Determine need for increased observation, equipment and environmental modification, such as low bed, signage and supportive, nonskid footwear.  Adjust safety measures to individual developmental age, stage and identified risk factors.  Reinforce the importance of safety and physical activity with patient and family.  Perform regular intentional rounding to assess need for position change, pain assessment and personal needs, including assistance with toileting.  Recent Flowsheet Documentation  Taken 8/25/2023 1700 by Radha Noriega RN  Safety Promotion/Fall Prevention: safety round/check completed  Taken 8/25/2023 1634 by Radha Noriega RN  Safety Promotion/Fall Prevention: safety round/check completed  Taken 8/25/2023 1500 by Radha Noriega RN  Safety Promotion/Fall Prevention: safety round/check completed  Intervention: Prevent Skin Injury  Description: Perform a screening for skin injury risk, such as pressure or moisture associated skin damage on admission and at regular intervals throughout hospital stay.  Keep all areas of skin (especially folds) clean and dry.  Maintain adequate skin hydration.  Relieve and redistribute pressure and protect bony prominences; implement measures based on patient-specific risk factors.  Match turning and repositioning schedule to clinical condition.  Encourage weight shift frequently; assist with reposition if unable to complete independently.  Float heels  off bed; avoid pressure on the Achilles tendon.  Keep skin free from extended contact with medical devices.  Encourage functional activity and mobility, as early as tolerated.  Use aids (e.g., slide boards, mechanical lift) during transfer.  Recent Flowsheet Documentation  Taken 8/25/2023 1700 by Radha Noriega RN  Body Position: position changed independently  Taken 8/25/2023 1634 by Radha Noriega RN  Body Position: position changed independently  Taken 8/25/2023 1500 by Radha Noriega RN  Body Position: position changed independently  Intervention: Prevent and Manage VTE (Venous Thromboembolism) Risk  Description: Assess for VTE (venous thromboembolism) risk.  Encourage and assist with early ambulation.  Initiate and maintain compression or other therapy, as indicated, based on identified risk in accordance with organizational protocol and provider order.  Encourage both active and passive leg exercises while in bed, if unable to ambulate.  Recent Flowsheet Documentation  Taken 8/25/2023 1700 by Radha Noriega RN  Activity Management: activity encouraged  Taken 8/25/2023 1634 by Radha Noriega RN  Activity Management: activity encouraged  Taken 8/25/2023 1500 by Radha Noriega RN  Activity Management: activity encouraged  VTE Prevention/Management: (see MAR) other (see comments)  Goal: Optimal Comfort and Wellbeing  Outcome: Ongoing, Progressing     Problem: Adult Inpatient Plan of Care  Goal: Readiness for Transition of Care  Intervention: Mutually Develop Transition Plan  Description: Identify available resources for support (e.g., family, friends, community).  Identify and address barriers to ongoing treatment and home management (e.g., environmental, financial).  Provide opportunities to practice self-management skills.  Assess and monitor emotional readiness for transition.  Establish or reconnect linkage with outpatient providers or community-based services.  Recent Flowsheet  Documentation  Taken 8/25/2023 1502 by Radha Noriega RN  Equipment Currently Used at Home: none  Taken 8/25/2023 1459 by Radha Noriega RN  Transportation Anticipated: health plan transportation  Patient/Family Anticipated Services at Transition:   Patient/Family Anticipates Transition to: home     Problem: Fall Injury Risk  Goal: Absence of Fall and Fall-Related Injury  Intervention: Promote Injury-Free Environment  Description: Provide a safe, barrier-free environment that encourages independent activity.  Keep care area uncluttered and well-lighted.  Determine need for increased observation or monitoring.  Avoid use of devices that minimize mobility, such as restraints or indwelling urinary catheter.  Recent Flowsheet Documentation  Taken 8/25/2023 1700 by Radha Noriega RN  Safety Promotion/Fall Prevention: safety round/check completed  Taken 8/25/2023 1634 by Radha Noriega RN  Safety Promotion/Fall Prevention: safety round/check completed  Taken 8/25/2023 1500 by Radha Noriega RN  Safety Promotion/Fall Prevention: safety round/check completed     Problem: Skin Injury Risk Increased  Goal: Skin Health and Integrity  Intervention: Optimize Skin Protection  Description: Perform a full pressure injury risk assessment, as indicated by screening, upon admission to care unit.  Reassess skin (injury risk, full inspection) frequently (e.g., scheduled interval, with change in condition) to provide optimal early detection and prevention.  Maintain adequate tissue perfusion (e.g., encourage fluid balance; avoid crossing legs, constrictive clothing or devices) to promote tissue oxygenation.  Maintain head of bed at lowest degree of elevation tolerated, considering medical condition and other restrictions.  Avoid positioning onto an area that remains reddened.  Minimize incontinence and moisture (e.g., toileting schedule; moisture-wicking pad, diaper or incontinence collection device; skin  moisture barrier).  Cleanse skin promptly and gently when soiled utilizing a pH-balanced cleanser.  Relieve and redistribute pressure (e.g., scheduled position changes, weight shifts, use of support surface, medical device repositioning, protective dressing application, use of positioning device, microclimate control, use of pressure-injury-monitor  Encourage increased activity, such as sitting in a chair at the bedside or early mobilization, when able to tolerate.  Recent Flowsheet Documentation  Taken 8/25/2023 1634 by Radha Noriega, RN  Head of Bed (HOB) Positioning: HOB elevated   Goal Outcome Evaluation:

## 2023-08-25 NOTE — ED PROVIDER NOTES
Subjective   History of Present Illness  Patient presents to the emergency department from the nursing home with shortness of breath that began yesterday.  At the nursing home today she had a O2 sat of 85%.  She is also noted to have labored breathing and palpitations and currently is in atrial fibrillation with rapid ventricular response.    Shortness of Breath  Severity:  Moderate  Duration:  2 days  Timing:  Constant  Progression:  Worsening  Relieved by:  Oxygen, rest and sitting up  Atrial Fibrillation  Severity:  Moderate  Timing:  Intermittent  Progression:  Waxing and waning  Chronicity:  Recurrent  Associated symptoms: shortness of breath    Shortness of breath:     Severity:  Mild    Duration:  2 days    Timing:  Constant    Progression:  Waxing and waning    Review of Systems   Unable to perform ROS: Acuity of condition   Constitutional:  Positive for activity change.   Respiratory:  Positive for shortness of breath.      Past Medical History:   Diagnosis Date    Cancer 2009    leukemia    Coronary artery disease     Diabetes mellitus     Hypertension     Injury of back     Myocardial infarction     Stroke        Allergies   Allergen Reactions    Sulfa Antibiotics Itching and Rash     Patient states she had the Worse yeast infection ever.  Reaction: rash  Patient states she had the Worse yeast infection ever.      Atorvastatin      Memory    Levofloxacin     Meclizine     Morphine        Past Surgical History:   Procedure Laterality Date    CARDIAC CATHETERIZATION Left 9/18/2022    Procedure: Left Heart Cath;  Surgeon: Ayaz Wild DO;  Location: [facility] CATH INVASIVE LOCATION;  Service: Cardiology;  Laterality: Left;    CARDIAC CATHETERIZATION N/A 9/19/2022    Procedure: PERCUTANEOUS CORONARY INTERVENTION;  Surgeon: Ayaz Wild DO;  Location: [facility] CATH INVASIVE LOCATION;  Service: Cardiology;  Laterality: N/A;    CATARACT EXTRACTION      TRIGEMINAL NERVE DECOMPRESSION      TUBAL ABDOMINAL LIGATION       UVULOPALATOPHARYNGOPLASTY         History reviewed. No pertinent family history.    Social History     Socioeconomic History    Marital status:    Tobacco Use    Smoking status: Never    Smokeless tobacco: Never   Vaping Use    Vaping Use: Never used   Substance and Sexual Activity    Alcohol use: No    Drug use: No    Sexual activity: Never           Objective   Physical Exam  Vitals and nursing note reviewed.   Constitutional:       Appearance: She is well-developed.   HENT:      Head: Normocephalic and atraumatic.      Nose: Nose normal.   Eyes:      General: No scleral icterus.        Right eye: No discharge.         Left eye: No discharge.      Conjunctiva/sclera: Conjunctivae normal.      Pupils: Pupils are equal, round, and reactive to light.   Neck:      Trachea: No tracheal deviation.   Cardiovascular:      Rate and Rhythm: Normal rate and regular rhythm. Tachycardia present. Rhythm irregularly irregular.      Heart sounds: Normal heart sounds. No murmur heard.  Pulmonary:      Effort: Pulmonary effort is normal. Tachypnea and accessory muscle usage present. No respiratory distress.      Breath sounds: Normal breath sounds. No stridor. No wheezing or rales.   Abdominal:      General: Bowel sounds are normal. There is no distension.      Palpations: Abdomen is soft. There is no mass.      Tenderness: There is no abdominal tenderness. There is no guarding or rebound.   Musculoskeletal:      Cervical back: Normal range of motion and neck supple.   Skin:     General: Skin is warm and dry.      Findings: No erythema or rash.   Neurological:      Mental Status: She is alert and oriented to person, place, and time.      Coordination: Coordination normal.   Psychiatric:         Behavior: Behavior normal.         Thought Content: Thought content normal.       ECG 12 Lead      Date/Time: 8/25/2023 12:33 PM  Performed by: James Golden MD  Authorized by: James Golden MD   Interpreted by  physician  Rhythm: atrial fibrillation  BPM: 124  ST Segments: ST segments normal             ED Course           Labs Reviewed   COMPREHENSIVE METABOLIC PANEL - Abnormal; Notable for the following components:       Result Value    Glucose 180 (*)     BUN 33 (*)     Creatinine 1.61 (*)     CO2 19.0 (*)     Alkaline Phosphatase 169 (*)     Anion Gap 16.0 (*)     eGFR 31.8 (*)     All other components within normal limits    Narrative:     GFR Normal >60  Chronic Kidney Disease <60  Kidney Failure <15    The GFR formula is only valid for adults with stable renal function between ages 18 and 70.   CBC WITH AUTO DIFFERENTIAL - Abnormal; Notable for the following components:    WBC 11.73 (*)     RBC 3.74 (*)     Hemoglobin 11.3 (*)     Hematocrit 33.8 (*)     Neutrophil % 81.5 (*)     Lymphocyte % 9.0 (*)     Immature Grans % 0.7 (*)     Neutrophils, Absolute 9.57 (*)     Immature Grans, Absolute 0.08 (*)     All other components within normal limits   BNP (IN-HOUSE) - Abnormal; Notable for the following components:    proBNP 11,536.0 (*)     All other components within normal limits    Narrative:     Among patients with dyspnea, NT-proBNP is highly sensitive for the detection of acute congestive heart failure. In addition NT-proBNP of <300 pg/ml effectively rules out acute congestive heart failure with 99% negative predictive value.     TROPONIN - Abnormal; Notable for the following components:    HS Troponin T 26 (*)     All other components within normal limits    Narrative:     High Sensitive Troponin T Reference Range:  <10.0 ng/L- Negative Female for AMI  <15.0 ng/L- Negative Male for AMI  >=10 - Abnormal Female indicating possible myocardial injury.  >=15 - Abnormal Male indicating possible myocardial injury.   Clinicians would have to utilize clinical acumen, EKG, Troponin, and serial changes to determine if it is an Acute Myocardial Infarction or myocardial injury due to an underlying chronic condition.         CARBAMAZEPINE LEVEL, TOTAL - Abnormal; Notable for the following components:    Carbamazepine Level 13.2 (*)     All other components within normal limits   BLOOD GAS, ARTERIAL - Abnormal; Notable for the following components:    pCO2, Arterial 33.7 (*)     Base Excess, Arterial -1.2 (*)     All other components within normal limits   HIGH SENSITIVITIY TROPONIN T 2HR - Abnormal; Notable for the following components:    HS Troponin T 27 (*)     All other components within normal limits    Narrative:     High Sensitive Troponin T Reference Range:  <10.0 ng/L- Negative Female for AMI  <15.0 ng/L- Negative Male for AMI  >=10 - Abnormal Female indicating possible myocardial injury.  >=15 - Abnormal Male indicating possible myocardial injury.   Clinicians would have to utilize clinical acumen, EKG, Troponin, and serial changes to determine if it is an Acute Myocardial Infarction or myocardial injury due to an underlying chronic condition.        HEMOGLOBIN A1C - Abnormal; Notable for the following components:    Hemoglobin A1C 7.60 (*)     All other components within normal limits    Narrative:     Hemoglobin A1C Ranges:    Increased Risk for Diabetes  5.7% to 6.4%  Diabetes                     >= 6.5%  Diabetic Goal                < 7.0%   CK - Normal   MAGNESIUM - Normal   MAGNESIUM - Normal   TSH+FREE T4 - Normal   BLOOD GAS, ARTERIAL   POCT GLUCOSE FINGERSTICK   POCT GLUCOSE FINGERSTICK   POCT GLUCOSE FINGERSTICK   CBC AND DIFFERENTIAL    Narrative:     The following orders were created for panel order CBC & Differential.  Procedure                               Abnormality         Status                     ---------                               -----------         ------                     CBC Auto Differential[[id number]]        Abnormal            Final result                 Please view results for these tests on the individual orders.   EXTRA TUBES    Narrative:     The following orders were created for panel  order Extra Tubes.  Procedure                               Abnormality         Status                     ---------                               -----------         ------                     Gold Top - SST[[id number]]                                   Final result               Light Blue Top[[id number]]                                   Final result                 Please view results for these tests on the individual orders.   GOLD TOP - SST   LIGHT BLUE TOP       XR Chest 1 View   Final Result                                            Medical Decision Making  Problems Addressed:  Acute on chronic congestive heart failure, unspecified heart failure type: complicated acute illness or injury  Atrial fibrillation with RVR: complicated acute illness or injury    Amount and/or Complexity of Data Reviewed  Labs: ordered.  Radiology: ordered.  ECG/medicine tests: ordered and independent interpretation performed.    Risk  Prescription drug management.  Decision regarding hospitalization.        Final diagnoses:   Acute on chronic congestive heart failure, unspecified heart failure type   Atrial fibrillation with RVR       ED Disposition  ED Disposition       ED Disposition   Decision to Admit    Condition   --    Comment   Level of Care: Telemetry [5]   Diagnosis: A-fib [332287]   Admitting Physician: MICAH KOCH [[id number]]   Attending Physician: MICAH KOCH [[id number]]                 No follow-up provider specified.       Medication List        Changed      rosuvastatin 20 MG tablet  Commonly known as: CRESTOR  Take 1 tablet by mouth Every Night.  What changed: how much to take                 James Golden MD  08/25/23 8565       James Golden MD  08/25/23 7924

## 2023-08-25 NOTE — SIGNIFICANT NOTE
08/25/23 1455   OTHER   Discipline occupational therapist   Rehab Time/Intention   Session Not Performed patient/family declined evaluation;other (see comments)  (Pt asleep on entry. Easily arousable but declined OT eval this date. Pt requests therapy check back tomorrow as able.)   Recommendation   PT - Next Appointment 08/26/23   Recommendation   OT - Next Appointment 08/26/23

## 2023-08-25 NOTE — PROGRESS NOTES
SIGNIFICANT NOTE  NAME: Magy Barton  : 1940  MRN: [id number]    Received call from [facility] on 2023 at 06:39 CDT.  Notified from patient's nurse that she was pale, having shortness of breath, and feeling very sick like she did when she was first admitted for atrial fibrillation.  At that time her oxygen saturation was 85% and her nasal cannula oxygen was increased and she is now satting at 92%.  Blood pressure at the time was 184/118.  Patient has received her Cardizem this morning.    Upon chart review patient was sent home with Eliquis and Cardizem.  BNP was elevated to the 9000s at last admission and there was no D-dimer available in her chart.  Gave verbal orders for her to come to the emergency department to be evaluated.        This document has been electronically signed by Yasir Vanessa MD on 2023 06:39 CDT

## 2023-08-25 NOTE — Clinical Note
Level of Care: Stepdown [25]   Diagnosis: Atrial fibrillation with RVR [999673]   Admitting Physician: MICAH KOCH [[id number]]   Attending Physician: MICAH KOCH [[id number]]

## 2023-08-26 LAB
ALBUMIN SERPL-MCNC: 3.1 G/DL (ref 3.5–5.2)
ALBUMIN/GLOB SERPL: 0.7 G/DL
ALP SERPL-CCNC: 143 U/L (ref 39–117)
ALT SERPL W P-5'-P-CCNC: 11 U/L (ref 1–33)
ANION GAP SERPL CALCULATED.3IONS-SCNC: 12 MMOL/L (ref 5–15)
AST SERPL-CCNC: 18 U/L (ref 1–32)
BASOPHILS # BLD AUTO: 0.1 10*3/MM3 (ref 0–0.2)
BASOPHILS NFR BLD AUTO: 1.2 % (ref 0–1.5)
BILIRUB SERPL-MCNC: 0.3 MG/DL (ref 0–1.2)
BUN SERPL-MCNC: 39 MG/DL (ref 8–23)
BUN/CREAT SERPL: 24.4 (ref 7–25)
CALCIUM SPEC-SCNC: 8.4 MG/DL (ref 8.6–10.5)
CHLORIDE SERPL-SCNC: 102 MMOL/L (ref 98–107)
CO2 SERPL-SCNC: 27 MMOL/L (ref 22–29)
CREAT SERPL-MCNC: 1.6 MG/DL (ref 0.57–1)
DEPRECATED RDW RBC AUTO: 46.6 FL (ref 37–54)
EGFRCR SERPLBLD CKD-EPI 2021: 32.1 ML/MIN/1.73
EOSINOPHIL # BLD AUTO: 0.14 10*3/MM3 (ref 0–0.4)
EOSINOPHIL NFR BLD AUTO: 1.7 % (ref 0.3–6.2)
ERYTHROCYTE [DISTWIDTH] IN BLOOD BY AUTOMATED COUNT: 14 % (ref 12.3–15.4)
GLOBULIN UR ELPH-MCNC: 4.2 GM/DL
GLUCOSE BLDC GLUCOMTR-MCNC: 106 MG/DL (ref 70–130)
GLUCOSE BLDC GLUCOMTR-MCNC: 144 MG/DL (ref 70–130)
GLUCOSE BLDC GLUCOMTR-MCNC: 150 MG/DL (ref 70–130)
GLUCOSE BLDC GLUCOMTR-MCNC: 254 MG/DL (ref 70–130)
GLUCOSE BLDC GLUCOMTR-MCNC: 92 MG/DL (ref 70–130)
GLUCOSE BLDC GLUCOMTR-MCNC: 93 MG/DL (ref 70–130)
GLUCOSE SERPL-MCNC: 88 MG/DL (ref 65–99)
HCT VFR BLD AUTO: 31.4 % (ref 34–46.6)
HGB BLD-MCNC: 10.3 G/DL (ref 12–15.9)
IMM GRANULOCYTES # BLD AUTO: 0.04 10*3/MM3 (ref 0–0.05)
IMM GRANULOCYTES NFR BLD AUTO: 0.5 % (ref 0–0.5)
LYMPHOCYTES # BLD AUTO: 1.64 10*3/MM3 (ref 0.7–3.1)
LYMPHOCYTES NFR BLD AUTO: 19.6 % (ref 19.6–45.3)
MAGNESIUM SERPL-MCNC: 2 MG/DL (ref 1.6–2.4)
MCH RBC QN AUTO: 29.6 PG (ref 26.6–33)
MCHC RBC AUTO-ENTMCNC: 32.8 G/DL (ref 31.5–35.7)
MCV RBC AUTO: 90.2 FL (ref 79–97)
MONOCYTES # BLD AUTO: 0.88 10*3/MM3 (ref 0.1–0.9)
MONOCYTES NFR BLD AUTO: 10.5 % (ref 5–12)
NEUTROPHILS NFR BLD AUTO: 5.57 10*3/MM3 (ref 1.7–7)
NEUTROPHILS NFR BLD AUTO: 66.5 % (ref 42.7–76)
NRBC BLD AUTO-RTO: 0 /100 WBC (ref 0–0.2)
PLATELET # BLD AUTO: 219 10*3/MM3 (ref 140–450)
PMV BLD AUTO: 9.7 FL (ref 6–12)
POTASSIUM SERPL-SCNC: 3.4 MMOL/L (ref 3.5–5.2)
PROT SERPL-MCNC: 7.3 G/DL (ref 6–8.5)
RBC # BLD AUTO: 3.48 10*6/MM3 (ref 3.77–5.28)
SODIUM SERPL-SCNC: 141 MMOL/L (ref 136–145)
WBC NRBC COR # BLD: 8.37 10*3/MM3 (ref 3.4–10.8)

## 2023-08-26 PROCEDURE — 25010000002 FUROSEMIDE PER 20 MG: Performed by: HOSPITALIST

## 2023-08-26 PROCEDURE — 85025 COMPLETE CBC W/AUTO DIFF WBC: CPT | Performed by: HOSPITALIST

## 2023-08-26 PROCEDURE — 97162 PT EVAL MOD COMPLEX 30 MIN: CPT | Performed by: PHYSICAL THERAPIST

## 2023-08-26 PROCEDURE — 96376 TX/PRO/DX INJ SAME DRUG ADON: CPT

## 2023-08-26 PROCEDURE — 82948 REAGENT STRIP/BLOOD GLUCOSE: CPT

## 2023-08-26 PROCEDURE — 63710000001 INSULIN DETEMIR PER 5 UNITS: Performed by: INTERNAL MEDICINE

## 2023-08-26 PROCEDURE — 97166 OT EVAL MOD COMPLEX 45 MIN: CPT

## 2023-08-26 PROCEDURE — 83735 ASSAY OF MAGNESIUM: CPT | Performed by: HOSPITALIST

## 2023-08-26 PROCEDURE — 63710000001 INSULIN ASPART PER 5 UNITS: Performed by: HOSPITALIST

## 2023-08-26 PROCEDURE — 36415 COLL VENOUS BLD VENIPUNCTURE: CPT | Performed by: HOSPITALIST

## 2023-08-26 PROCEDURE — 80053 COMPREHEN METABOLIC PANEL: CPT | Performed by: HOSPITALIST

## 2023-08-26 PROCEDURE — G0378 HOSPITAL OBSERVATION PER HR: HCPCS

## 2023-08-26 RX ADMIN — DILTIAZEM HYDROCHLORIDE 60 MG: 60 TABLET, FILM COATED ORAL at 21:19

## 2023-08-26 RX ADMIN — Medication 10 ML: at 08:25

## 2023-08-26 RX ADMIN — DOCUSATE SODIUM 50 MG AND SENNOSIDES 8.6 MG 2 TABLET: 8.6; 5 TABLET, FILM COATED ORAL at 08:24

## 2023-08-26 RX ADMIN — DOCUSATE SODIUM 50 MG AND SENNOSIDES 8.6 MG 2 TABLET: 8.6; 5 TABLET, FILM COATED ORAL at 21:05

## 2023-08-26 RX ADMIN — VENLAFAXINE 37.5 MG: 37.5 TABLET ORAL at 08:24

## 2023-08-26 RX ADMIN — INSULIN ASPART 8 UNITS: 100 INJECTION, SOLUTION INTRAVENOUS; SUBCUTANEOUS at 11:30

## 2023-08-26 RX ADMIN — APIXABAN 5 MG: 5 TABLET, FILM COATED ORAL at 00:13

## 2023-08-26 RX ADMIN — LISINOPRIL 40 MG: 20 TABLET ORAL at 08:23

## 2023-08-26 RX ADMIN — FUROSEMIDE 40 MG: 10 INJECTION, SOLUTION INTRAMUSCULAR; INTRAVENOUS at 00:13

## 2023-08-26 RX ADMIN — Medication 10 ML: at 21:06

## 2023-08-26 RX ADMIN — MELATONIN 3 MG: 3 TAB ORAL at 21:06

## 2023-08-26 RX ADMIN — VENLAFAXINE 37.5 MG: 37.5 TABLET ORAL at 17:08

## 2023-08-26 RX ADMIN — FUROSEMIDE 40 MG: 10 INJECTION, SOLUTION INTRAMUSCULAR; INTRAVENOUS at 11:02

## 2023-08-26 RX ADMIN — APIXABAN 5 MG: 5 TABLET, FILM COATED ORAL at 11:02

## 2023-08-26 RX ADMIN — DILTIAZEM HYDROCHLORIDE 60 MG: 60 TABLET, FILM COATED ORAL at 13:40

## 2023-08-26 RX ADMIN — ROSUVASTATIN CALCIUM 20 MG: 10 TABLET, FILM COATED ORAL at 21:05

## 2023-08-26 RX ADMIN — INSULIN DETEMIR 15 UNITS: 100 INJECTION, SOLUTION SUBCUTANEOUS at 21:19

## 2023-08-26 RX ADMIN — DILTIAZEM HYDROCHLORIDE 60 MG: 60 TABLET, FILM COATED ORAL at 06:08

## 2023-08-26 RX ADMIN — TICAGRELOR 90 MG: 90 TABLET ORAL at 21:05

## 2023-08-26 RX ADMIN — CARVEDILOL 12.5 MG: 12.5 TABLET, FILM COATED ORAL at 08:24

## 2023-08-26 RX ADMIN — TICAGRELOR 90 MG: 90 TABLET ORAL at 08:24

## 2023-08-26 NOTE — PLAN OF CARE
Goal Outcome Evaluation:  Plan of Care Reviewed With: patient, son           Outcome Evaluation: OT eval complete, co-eval with PT. Pt in fowlers upon arrival and agreeable to therapy. Pts son assisted with PLOF history, able to perform feeding/grooming, but requires max A for bathing and dressing. Pts BUE ROM was WFL. Pts  B shld 3+/5 grossly, B elbow flex 4/5 grossly. Pt perf sup>sit with Shelly with HOB raised. Pt perf sit<>stand with Shelly and FWW, brian to perform side steps and 4 steps forward/back with CGA and FWW. Pt perf sit>sup with mod A. Pt dependentx2 to scoot to HOB. Pt demonstrated decreased independence with ADLs, stregnth, and transfer safety. Cont inpatient OT. Recommend return to SNF with continued therapy to increase independence with ADLs and transfer safety.

## 2023-08-26 NOTE — CONSULTS
Adult Nutrition  Assessment/PES    Patient Name:  Magy Barton  YOB: 1940  MRN: [id number]  Admit Date:  8/25/2023    Assessment Date:  8/26/2023    Comments:  RD staff seeing for CHF.  Pt not appropriate for edu as she is NHR and adv age.  Pt was seen by this RD on past admit and was not eating well.  Will add boost TID and monitor intake during admit.  # appears down from -190#.  No Edema noted.       Active Hospital Problems:  Active Hospital Problems    Diagnosis  POA    **Atrial fibrillation with RVR [I48.91]  Yes    A-fib [I48.91]  Yes      Resolved Hospital Problems   No resolved problems to display.     Past Medical History:   Diagnosis Date    Cancer 2009    leukemia    Coronary artery disease     Diabetes mellitus     Hypertension     Injury of back     Myocardial infarction     Stroke         Reason for Assessment       Row Name 08/26/23 1132          Reason for Assessment    Reason For Assessment identified at risk by screening criteria     Identified At Risk by Screening Criteria need for education                    Nutrition/Diet History       Row Name 08/26/23 1132          Nutrition/Diet History    Typical Intake (Food/Fluid/EN/PN) on last admit, pt was not eating well.  Unsure how pt was eating at NH                    Labs/Tests/Procedures/Meds       Row Name 08/26/23 1132          Labs/Procedures/Meds    Lab Results Reviewed reviewed, pertinent     Lab Results Comments K 3.4, Glu 254, BUN 39, Cr 1.6, A1c 7.6, Alb 3.1        Diagnostic Tests/Procedures    Diagnostic Test/Procedure Reviewed reviewed        Medications    Pertinent Medications Reviewed reviewed, pertinent     Pertinent Medications Comments lasix, ssi, levemir, crestor, pericolace                      Estimated/Assessed Needs - Anthropometrics       Row Name 08/26/23 1133 08/26/23 0503       Anthropometrics    Weight -- 80.6 kg (177 lb 11.2 oz)    Weight for Calculation 80.3 kg (177 lb) --        Estimated/Assessed Needs    Additional Documentation KCAL/KG (Group);Protein Requirements (Group);Fluid Requirements (Group) --       KCAL/KG    KCAL/KG 18 Kcal/Kg (kcal);20 Kcal/Kg (kcal) --    18 Kcal/Kg (kcal) 1445.166 --    20 Kcal/Kg (kcal) 1605.74 --       Protein Requirements    Weight Used For Protein Calculations 80.3 kg (177 lb) --    Est Protein Requirement Amount (gms/kg) 0.7 gm protein --    Estimated Protein Requirements (gms/day) 56.2 --       Fluid Requirements    Fluid Requirements (mL/day) 1600 --    RDA Method (mL) 1600 --                   Nutrition Prescription Ordered       Row Name 08/26/23 1133          Nutrition Prescription PO    Current PO Diet Soft Texture     Texture Ground     Common Modifiers Cardiac;Consistent Carbohydrate                         Problem/Interventions:   Problem 1       Row Name 08/26/23 1133          Nutrition Diagnoses Problem 1    Problem 1 No Nutrition Diagnosis at this Time                          Intervention Goal       Row Name 08/26/23 1134          Intervention Goal    General Maintain nutrition;Meet nutritional needs for age/condition     PO Meet estimated needs     Weight No significant weight loss                    Nutrition Intervention       Row Name 08/26/23 1134          Nutrition Intervention    RD/Tech Action Follow Tx progress;Recommend/ordered     Recommended/Ordered Supplement                    Nutrition Prescription       Row Name 08/26/23 1134          Nutrition Prescription PO    PO Prescription Begin/change supplement     Supplement Boost Plus     Supplement Frequency 3 times a day                    Education/Evaluation       Row Name 08/26/23 1134          Education    Education Will Instruct as appropriate        Monitor/Evaluation    Monitor Per protocol;PO intake;Supplement intake;Pertinent labs;Weight                     Electronically signed by:  Bertha Hdz RD  08/26/23 11:35 CDT

## 2023-08-26 NOTE — PLAN OF CARE
Problem: Adult Inpatient Plan of Care  Goal: Plan of Care Review  Outcome: Ongoing, Progressing  Goal: Patient-Specific Goal (Individualized)  Outcome: Ongoing, Progressing  Goal: Absence of Hospital-Acquired Illness or Injury  Outcome: Ongoing, Progressing  Intervention: Identify and Manage Fall Risk  Description: Perform standard risk assessment on admission using a validated tool or comprehensive approach appropriate to the patient; reassess fall risk frequently, with change in status or transfer to another level of care.  Communicate fall injury risk to interprofessional healthcare team.  Determine need for increased observation, equipment and environmental modification, such as low bed, signage and supportive, nonskid footwear.  Adjust safety measures to individual developmental age, stage and identified risk factors.  Reinforce the importance of safety and physical activity with patient and family.  Perform regular intentional rounding to assess need for position change, pain assessment and personal needs, including assistance with toileting.  Recent Flowsheet Documentation  Taken 8/26/2023 1500 by Radha Noriega RN  Safety Promotion/Fall Prevention: safety round/check completed  Taken 8/26/2023 1300 by Radha Noriega RN  Safety Promotion/Fall Prevention: safety round/check completed  Taken 8/26/2023 1100 by Radha Noriega RN  Safety Promotion/Fall Prevention: safety round/check completed  Taken 8/26/2023 0900 by Radha Noriega RN  Safety Promotion/Fall Prevention: safety round/check completed  Taken 8/26/2023 0700 by Radha Noriega RN  Safety Promotion/Fall Prevention: safety round/check completed  Intervention: Prevent Skin Injury  Description: Perform a screening for skin injury risk, such as pressure or moisture associated skin damage on admission and at regular intervals throughout hospital stay.  Keep all areas of skin (especially folds) clean and dry.  Maintain adequate skin  hydration.  Relieve and redistribute pressure and protect bony prominences; implement measures based on patient-specific risk factors.  Match turning and repositioning schedule to clinical condition.  Encourage weight shift frequently; assist with reposition if unable to complete independently.  Float heels off bed; avoid pressure on the Achilles tendon.  Keep skin free from extended contact with medical devices.  Encourage functional activity and mobility, as early as tolerated.  Use aids (e.g., slide boards, mechanical lift) during transfer.  Recent Flowsheet Documentation  Taken 8/26/2023 1500 by Radha Noriega RN  Body Position:   left   turned  Taken 8/26/2023 1300 by Radha Noriega RN  Body Position:   right   turned  Taken 8/26/2023 1100 by Radha Noriega RN  Body Position: supine, legs elevated  Taken 8/26/2023 0900 by Radha Noriega RN  Body Position: turned  Taken 8/26/2023 0700 by Radha Noriega RN  Body Position:   turned   right  Intervention: Prevent and Manage VTE (Venous Thromboembolism) Risk  Description: Assess for VTE (venous thromboembolism) risk.  Encourage and assist with early ambulation.  Initiate and maintain compression or other therapy, as indicated, based on identified risk in accordance with organizational protocol and provider order.  Encourage both active and passive leg exercises while in bed, if unable to ambulate.  Recent Flowsheet Documentation  Taken 8/26/2023 1500 by Radha Noriega RN  Activity Management: activity encouraged  Taken 8/26/2023 1300 by Radha Noriega RN  Activity Management: patient refuses activity  Taken 8/26/2023 1100 by Radha Noriega RN  Activity Management: activity encouraged  Taken 8/26/2023 0900 by Radha Noriega RN  Activity Management: activity encouraged  Taken 8/26/2023 0824 by Radha Noriega RN  VTE Prevention/Management: (Eliquis) other (see comments)  Taken 8/26/2023 0700 by Radha Noriega RN  Activity Management:  activity encouraged     Problem: Fall Injury Risk  Goal: Absence of Fall and Fall-Related Injury  Intervention: Promote Injury-Free Environment  Description: Provide a safe, barrier-free environment that encourages independent activity.  Keep care area uncluttered and well-lighted.  Determine need for increased observation or monitoring.  Avoid use of devices that minimize mobility, such as restraints or indwelling urinary catheter.  Recent Flowsheet Documentation  Taken 8/26/2023 1500 by Radha Noriega RN  Safety Promotion/Fall Prevention: safety round/check completed  Taken 8/26/2023 1300 by Radha Noriega RN  Safety Promotion/Fall Prevention: safety round/check completed  Taken 8/26/2023 1100 by Radha Noriega RN  Safety Promotion/Fall Prevention: safety round/check completed  Taken 8/26/2023 0900 by Radha Noriega RN  Safety Promotion/Fall Prevention: safety round/check completed  Taken 8/26/2023 0700 by Radha Noriega RN  Safety Promotion/Fall Prevention: safety round/check completed     Problem: Skin Injury Risk Increased  Goal: Skin Health and Integrity  Intervention: Optimize Skin Protection  Description: Perform a full pressure injury risk assessment, as indicated by screening, upon admission to care unit.  Reassess skin (injury risk, full inspection) frequently (e.g., scheduled interval, with change in condition) to provide optimal early detection and prevention.  Maintain adequate tissue perfusion (e.g., encourage fluid balance; avoid crossing legs, constrictive clothing or devices) to promote tissue oxygenation.  Maintain head of bed at lowest degree of elevation tolerated, considering medical condition and other restrictions.  Avoid positioning onto an area that remains reddened.  Minimize incontinence and moisture (e.g., toileting schedule; moisture-wicking pad, diaper or incontinence collection device; skin moisture barrier).  Cleanse skin promptly and gently when soiled utilizing a  pH-balanced cleanser.  Relieve and redistribute pressure (e.g., scheduled position changes, weight shifts, use of support surface, medical device repositioning, protective dressing application, use of positioning device, microclimate control, use of pressure-injury-monitor  Encourage increased activity, such as sitting in a chair at the bedside or early mobilization, when able to tolerate.  Recent Flowsheet Documentation  Taken 8/26/2023 1500 by Radha Noriega RN  Head of Bed (HOB) Positioning: HOB elevated  Taken 8/26/2023 1300 by Radha Noriega RN  Head of Bed ([facility]) Positioning: HOB elevated  Taken 8/26/2023 0900 by Radha Noriega RN  Head of Bed ([facility]) Positioning: HOB elevated  Taken 8/26/2023 0700 by Radha Noriega RN  Head of Bed ([facility]) Positioning: HOB elevated   Goal Outcome Evaluation:     No events to report this shift.  VSS at this time.

## 2023-08-26 NOTE — PLAN OF CARE
Goal Outcome Evaluation:              Outcome Evaluation: Pt rested overnight; long acting insulin dose adjusted per MD due to glucose of 144 and pt not eating supper meal; recent glucose 106. pt turned and repositioned q 2 hours; VSS at this time

## 2023-08-26 NOTE — THERAPY EVALUATION
Patient Name: Magy Barton  : 1940    MRN: [id number]                              Today's Date: 2023       Admit Date: 2023    Visit Dx:     ICD-10-CM ICD-9-CM   1. Acute on chronic congestive heart failure, unspecified heart failure type  I50.9 428.0   2. Atrial fibrillation with RVR  I48.91 427.31   3. Impaired mobility and ADLs [Z74.09, Z78.9 (ICD-10-CM)]  Z74.09 V49.89    Z78.9    4. Impaired functional mobility, balance, gait, and endurance [Z74.09 (ICD-10-CM)]  Z74.09 V49.89     Patient Active Problem List   Diagnosis    Urinary tract infection with hematuria    Colitis    Non-STEMI (non-ST elevated myocardial infarction)    Atrial fibrillation with rapid ventricular response    Atrial fibrillation with RVR    A-fib    Atrial fibrillation    Chronic systolic heart failure     Past Medical History:   Diagnosis Date    Cancer     leukemia    Coronary artery disease     Diabetes mellitus     Hypertension     Injury of back     Myocardial infarction     Stroke      Past Surgical History:   Procedure Laterality Date    CARDIAC CATHETERIZATION Left 2022    Procedure: Left Heart Cath;  Surgeon: Ayaz Wild DO;  Location: [facility] CATH INVASIVE LOCATION;  Service: Cardiology;  Laterality: Left;    CARDIAC CATHETERIZATION N/A 2022    Procedure: PERCUTANEOUS CORONARY INTERVENTION;  Surgeon: Ayaz Wild DO;  Location: [facility] CATH INVASIVE LOCATION;  Service: Cardiology;  Laterality: N/A;    CATARACT EXTRACTION      TRIGEMINAL NERVE DECOMPRESSION      TUBAL ABDOMINAL LIGATION      UVULOPALATOPHARYNGOPLASTY        General Information       Row Name 23 1104          Physical Therapy Time and Intention    Document Type evaluation  -BS     Mode of Treatment co-treatment;physical therapy;occupational therapy  -BS       Row Name 23 1106          General Information    Patient Profile Reviewed yes  -BS     Prior Level of Function min assist:;all household mobility;transfer;bed  mobility;ADL's  ambulates w/ assist a few feet with RW; mostly w/c bound in the facility, pushed by staff  -BS     Existing Precautions/Restrictions fall;other (see comments)  confusion, early dementia per pt's son  -BS     Barriers to Rehab medically complex;cognitive status  -BS       Row Name 08/26/23 1104          Living Environment    People in Home facility resident  -BS       Row Name 08/26/23 1104          Home Main Entrance    Number of Stairs, Main Entrance none  -BS       Row Name 08/26/23 1104          Cognition    Orientation Status (Cognition) oriented to;person  -BS               User Key  (r) = Recorded By, (t) = Taken By, (c) = Cosigned By      Initials Name Provider Type    BS Guzman Cherry PT Physical Therapist                   Mobility       Row Name 08/26/23 1104          Bed Mobility    Bed Mobility supine-sit;sit-supine;scooting/bridging  -BS     Scooting/Bridging [place] (Bed Mobility) dependent (less than 25% patient effort);2 person assist  -BS     Supine-Sit [place] (Bed Mobility) minimum assist (75% patient effort)  -BS     Sit-Supine [place] (Bed Mobility) moderate assist (50% patient effort)  -BS     Assistive Device (Bed Mobility) draw sheet;head of bed elevated  -BS       Row Name 08/26/23 1104          Sit-Stand Transfer    Sit-Stand [place] (Transfers) minimum assist (75% patient effort)  -BS     Assistive Device (Sit-Stand Transfers) walker, front-wheeled  -BS       Row Name 08/26/23 1104          Gait/Stairs (Locomotion)    [place] Level (Gait) contact guard  -BS     Assistive Device (Gait) walker, front-wheeled  -BS     Distance in Feet (Gait) 3' sidstepping to the right, 2 steps forward/bwd at edge of bed  -BS     Deviations/Abnormal Patterns (Gait) base of support, wide  -BS     Bilateral Gait Deviations forward flexed posture  -BS     [place] Level (Stairs) not tested  -BS     Comment, (Gait/Stairs) on and off 3 L O2 as her oxygen line was  short at edge of bed, SpO2 at 95% on 3L O2 post gait trial.  -BS               User Key  (r) = Recorded By, (t) = Taken By, (c) = Cosigned By      Initials Name Provider Type    Guzman Burgess PT Physical Therapist                   Obj/Interventions       Row Name 08/26/23 1104          Range of Motion Comprehensive    General Range of Motion bilateral lower extremity ROM WFL  -BS     Comment, General Range of Motion grossly WFL for B LE's with AROM  -BS       Row Name 08/26/23 1104          Strength Comprehensive (MMT)    Comment, General Manual Muscle Testing (MMT) Assessment B hip flex 3+/5 B knee flex/ext 4/5 B ankle DF 5/5  -BS       Row Name 08/26/23 1104          Balance    Balance Assessment sitting static balance;sitting dynamic balance  -BS     Static Sitting Balance standby assist  -BS     Dynamic Sitting Balance standby assist  -BS     Position, Sitting Balance unsupported  -BS       Row Name 08/26/23 1104          Sensory Assessment (Somatosensory)    Sensory Assessment (Somatosensory) bilateral LE  -BS     Bilateral LE Sensory Assessment light touch awareness;impaired;other (see comments)  B foot numbness  -BS               User Key  (r) = Recorded By, (t) = Taken By, (c) = Cosigned By      Initials Name Provider Type    Guzman Burgess PT Physical Therapist                   Goals/Plan       Row Name 08/26/23 1104          Bed Mobility Goal 1 (PT)    Activity/Assistive Device (Bed Mobility Goal 1, PT) sit to supine/supine to sit;scooting  -BS     [place] Level/Cues Needed (Bed Mobility Goal 1, PT) standby assist  -BS     Time Frame (Bed Mobility Goal 1, PT) by discharge  -BS     Strategies/Barriers (Bed Mobility Goal 1, PT) confusion, LE weakness  -BS     Progress/Outcomes (Bed Mobility Goal 1, PT) new goal  -BS       Row Name 08/26/23 1104          Transfer Goal 1 (PT)    Activity/Assistive Device (Transfer Goal 1, PT) sit-to-stand/stand-to-sit;bed-to-chair/chair-to-bed;walker, rolling   -BS     [place] Level/Cues Needed (Transfer Goal 1, PT) contact guard required  -BS     Time Frame (Transfer Goal 1, PT) by discharge  -BS       Row Name 08/26/23 1104          Gait Training Goal 1 (PT)    Activity/Assistive Device (Gait Training Goal 1, PT) gait (walking locomotion);assistive device use;decrease fall risk;improve balance and speed;increase endurance/gait distance;walker, rolling  -BS     [place] Level (Gait Training Goal 1, PT) contact guard required  -BS     Distance (Gait Training Goal 1, PT) 15' x 2  -BS     Time Frame (Gait Training Goal 1, PT) by discharge  -BS     Strategies/Barriers (Gait Training Goal 1, PT) confusion/dementia  -BS       Row Name 08/26/23 1104          Therapy Assessment/Plan (PT)    Planned Therapy Interventions (PT) balance training;bed mobility training;gait training;home exercise program;neuromuscular re-education;strengthening;ROM (range of motion);transfer training;stretching  -BS               User Key  (r) = Recorded By, (t) = Taken By, (c) = Cosigned By      Initials Name Provider Type    BS Guzman Cherry, PT Physical Therapist                   Clinical Impression       Row Name 08/26/23 1104          Pain    Pretreatment Pain Rating 0/10 - no pain  -BS     Posttreatment Pain Rating 0/10 - no pain  -BS       Row Name 08/26/23 1104          Plan of Care Review    Plan of Care Reviewed With patient  -BS     Outcome Evaluation PT evaluation completed as co-eval with OT. Patient presents alert to self and location only. On 3L O2 during assessment. Jason required going supine to sit and modA with sit to supine task. Jason required with sit to stand transfer with RW, CGA gait training x 3 steps sidestepping to the right at the EOB, CGA ambulating fwd/bwd x 2 steps each direction as well with RW. Limited by poor cognition, dyspnea, LE weakness and impaired with functional mobility. Would benefit from skilled PT to assist with improved independence with gait  training, improve LE strength and reduce her fall risk with balance training. Anticipate d/c back to SNF with skilled PT to follow up there for added strengthening.  -BS       Row Name 08/26/23 1104          Therapy Assessment/Plan (PT)    Rehab Potential (PT) fair, will monitor progress closely  -BS     Criteria for Skilled Interventions Met (PT) yes;meets criteria;skilled treatment is necessary  -BS     Therapy Frequency (PT) other (see comments)  5-7d/week  -BS     Predicted Duration of Therapy Intervention (PT) until goals are met or patient has been d/c'd  -BS       Row Name 08/26/23 1104          Vital Signs    Pre Systolic BP Rehab 120  -BS     Pre Treatment Diastolic BP 71  -BS     Pretreatment Heart Rate (beats/min) 80  -BS     Pre SpO2 (%) 97  -BS     O2 Delivery Pre Treatment nasal cannula  3L  -BS     Pre Patient Position Supine  -BS     Intra Patient Position Sitting  -BS     Post Patient Position Supine  -BS       Row Name 08/26/23 1104          Positioning and Restraints    Pre-Treatment Position in bed  -BS     Post Treatment Position bed  -BS     In Bed supine;with family/caregiver;exit alarm on  -BS               User Key  (r) = Recorded By, (t) = Taken By, (c) = Cosigned By      Initials Name Provider Type    Guzman Burgess, PT Physical Therapist                   Outcome Measures       Row Name 08/26/23 1104 08/26/23 0824       How much help from another person do you currently need...    Turning from your back to your side while in flat bed without using bedrails? 3  -BS 3  -CH    Moving from lying on back to sitting on the side of a flat bed without bedrails? 3  -BS 3  -CH    Moving to and from a bed to a chair (including a wheelchair)? 3  -BS 2  -CH    Standing up from a chair using your arms (e.g., wheelchair, bedside chair)? 3  -BS 2  -CH    Climbing 3-5 steps with a railing? 2  -BS 1  -CH    To walk in hospital room? 3  -BS 1  -CH    AM-PAC 6 Clicks Score (PT) 17  -BS 12  -CH    Highest  level of mobility 5 --> Static standing  -BS 4 --> Transferred to chair/commode  -      Row Name 08/26/23 1105 08/26/23 1104       Functional Assessment    Outcome Measure Options AM-PAC 6 Clicks Daily Activity (OT)  -RW AM-PAC 6 Clicks Basic Mobility (PT)  -BS              User Key  (r) = Recorded By, (t) = Taken By, (c) = Cosigned By      Initials Name Provider Type    Guzman Burgess, PT Physical Therapist    Radha Dejesus, RN Registered Nurse    Mireya Romero, OT Occupational Therapist                                 Physical Therapy Education       Title: PT OT SLP Therapies (In Progress)       Topic: Physical Therapy (Not Started)       Point: Mobility training (Not Started)       Learner Progress:  Not documented in this visit.              Point: Home exercise program (Not Started)       Learner Progress:  Not documented in this visit.              Point: Body mechanics (Not Started)       Learner Progress:  Not documented in this visit.              Point: Precautions (Not Started)       Learner Progress:  Not documented in this visit.                                  PT Recommendation and Plan  Planned Therapy Interventions (PT): balance training, bed mobility training, gait training, home exercise program, neuromuscular re-education, strengthening, ROM (range of motion), transfer training, stretching  Plan of Care Reviewed With: patient  Outcome Evaluation: PT evaluation completed as co-eval with OT. Patient presents alert to self and location only. On 3L O2 during assessment. Jason required going supine to sit and modA with sit to supine task. Jason required with sit to stand transfer with RW, CGA gait training x 3 steps sidestepping to the right at the EOB, CGA ambulating fwd/bwd x 2 steps each direction as well with RW. Limited by poor cognition, dyspnea, LE weakness and impaired with functional mobility. Would benefit from skilled PT to assist with improved independence with gait  training, improve LE strength and reduce her fall risk with balance training. Anticipate d/c back to SNF with skilled PT to follow up there for added strengthening.     Time Calculation:   PT Evaluation Complexity  History, PT Evaluation Complexity: 3 or more personal factors and/or comorbidities  Examination of Body Systems (PT Eval Complexity): total of 4 or more elements  Clinical Presentation (PT Evaluation Complexity): evolving  Clinical Decision Making (PT Evaluation Complexity): moderate complexity  Overall Complexity (PT Evaluation Complexity): moderate complexity     PT Charges       Row Name 08/26/23 1159             Time Calculation    Start Time 1104  -BS      Stop Time 1159  -BS      Time Calculation (min) 55 min  -BS      PT Received On 08/26/23  -BS      PT Goal Re-Cert Due Date 09/08/23  -BS         Time Calculation- PT    Total Timed Code Minutes- PT 55 minute(s)  -BS                User Key  (r) = Recorded By, (t) = Taken By, (c) = Cosigned By      Initials Name Provider Type    Guzman Burgess, PT Physical Therapist                  Therapy Charges for Today       Code Description Service Date Service Provider Modifiers Qty    [id number]  PT EVAL MOD COMPLEXITY 4 8/26/2023 Guzman Cherry, PT GP 1            PT G-Codes  Outcome Measure Options: AM-PAC 6 Clicks Daily Activity (OT)  AM-PAC 6 Clicks Score (PT): 17  AM-PAC 6 Clicks Score (OT): 14  PT Discharge Summary  Anticipated Discharge Disposition (PT): skilled nursing facility    Guzman Cherry PT  8/26/2023

## 2023-08-26 NOTE — PLAN OF CARE
Goal Outcome Evaluation:            RD staff seeing for CHF.  Pt not appropriate for edu as she is NHR and adv age.  Pt was seen by this RD on past admit and was not eating well.  Will add boost TID and monitor intake during admit.  # appears down from -190#.  No Edema noted.

## 2023-08-26 NOTE — THERAPY EVALUATION
Patient Name: Magy Barton  : 1940    MRN: [id number]                              Today's Date: 2023       Admit Date: 2023    Visit Dx:     ICD-10-CM ICD-9-CM   1. Acute on chronic congestive heart failure, unspecified heart failure type  I50.9 428.0   2. Atrial fibrillation with RVR  I48.91 427.31   3. Impaired mobility and ADLs [Z74.09, Z78.9 (ICD-10-CM)]  Z74.09 V49.89    Z78.9      Patient Active Problem List   Diagnosis    Urinary tract infection with hematuria    Colitis    Non-STEMI (non-ST elevated myocardial infarction)    Atrial fibrillation with rapid ventricular response    Atrial fibrillation with RVR    A-fib    Atrial fibrillation    Chronic systolic heart failure     Past Medical History:   Diagnosis Date    Cancer     leukemia    Coronary artery disease     Diabetes mellitus     Hypertension     Injury of back     Myocardial infarction     Stroke      Past Surgical History:   Procedure Laterality Date    CARDIAC CATHETERIZATION Left 2022    Procedure: Left Heart Cath;  Surgeon: Ayaz Wild DO;  Location: [facility] CATH INVASIVE LOCATION;  Service: Cardiology;  Laterality: Left;    CARDIAC CATHETERIZATION N/A 2022    Procedure: PERCUTANEOUS CORONARY INTERVENTION;  Surgeon: Ayaz Wild DO;  Location: [facility] CATH INVASIVE LOCATION;  Service: Cardiology;  Laterality: N/A;    CATARACT EXTRACTION      TRIGEMINAL NERVE DECOMPRESSION      TUBAL ABDOMINAL LIGATION      UVULOPALATOPHARYNGOPLASTY        General Information       Row Name 23 1105          OT Time and Intention    Document Type evaluation  -RW     Mode of Treatment co-treatment;physical therapy;occupational therapy  -RW       Row Name 23 1105          General Information    Patient Profile Reviewed yes  -RW     Prior Level of Function independent:;feeding;grooming;max assist:;dressing;bathing  -RW     Existing Precautions/Restrictions fall;other (see comments)  confusion, early dementia per pt's  son  -       Row Name 08/26/23 1105          Living Environment    People in Home facility resident  -RW       Row Name 08/26/23 1105          Home Main Entrance    Number of Stairs, Main Entrance none  -       Row Name 08/26/23 1105          Stairs Within Home, Primary    Stairs, Within Home, Primary ambulates w/ assist a few feet with RW; uses w/c more often and assisted by staff with w/c mobility.  -       Row Name 08/26/23 1105          Cognition    Orientation Status (Cognition) oriented to;person;place  -               User Key  (r) = Recorded By, (t) = Taken By, (c) = Cosigned By      Initials Name Provider Type    RW Mireya Kamara OT Occupational Therapist                     Mobility/ADL's       Row Name 08/26/23 1105          Bed Mobility    Bed Mobility supine-sit;sit-supine;scooting/bridging  -     Scooting/Bridging [place] (Bed Mobility) dependent (less than 25% patient effort);2 person assist  -     Supine-Sit [place] (Bed Mobility) minimum assist (75% patient effort)  -     Sit-Supine [place] (Bed Mobility) moderate assist (50% patient effort)  -     Assistive Device (Bed Mobility) draw sheet;head of bed elevated  -       Row Name 08/26/23 1105          Transfers    Transfers sit-stand transfer;stand-sit transfer  -       Row Name 08/26/23 1105          Sit-Stand Transfer    Sit-Stand [place] (Transfers) minimum assist (75% patient effort)  -     Assistive Device (Sit-Stand Transfers) walker, front-wheeled  -       Row Name 08/26/23 1105          Stand-Sit Transfer    Stand-Sit [place] (Transfers) minimum assist (75% patient effort)  -     Assistive Device (Stand-Sit Transfers) walker, front-wheeled  -       Row Name 08/26/23 1105          Activities of Daily Living    BADL Assessment/Intervention lower body dressing  -       Row Name 08/26/23 1105          Lower Body Dressing Assessment/Training    [place] Level (Lower Body Dressing)  don;socks;dependent (less than 25% patient effort)  -RW     Position (Lower Body Dressing) edge of bed sitting  -RW               User Key  (r) = Recorded By, (t) = Taken By, (c) = Cosigned By      Initials Name Provider Type    RW Mireya Kamara OT Occupational Therapist                   Obj/Interventions       Row Name 08/26/23 1105          Sensory Assessment (Somatosensory)    Sensory Assessment (Somatosensory) UE sensation intact  -[facility] Name 08/26/23 1105          Range of Motion Comprehensive    General Range of Motion bilateral upper extremity ROM WFL  -[facility] Name 08/26/23 1105          Strength Comprehensive (MMT)    General Manual Muscle Testing (MMT) Assessment upper extremity strength deficits identified  -RW     Comment, General Manual Muscle Testing (MMT) Assessment B shld 3+/5 grossly, B elbow flex 4/5 grossly  -RW               User Key  (r) = Recorded By, (t) = Taken By, (c) = Cosigned By      Initials Name Provider Type    RW Mireya Kamara OT Occupational Therapist                   Goals/Plan       [facility] Name 08/26/23 1105          Transfer Goal 1 (OT)    Activity/Assistive Device (Transfer Goal 1, OT) transfers, all  -RW     [place] Level/Cues Needed (Transfer Goal 1, OT) modified independence  -RW     Time Frame (Transfer Goal 1, OT) long term goal (LTG);by discharge  -RW     Progress/Outcome (Transfer Goal 1, OT) goal not met  -[facility] Name 08/26/23 1105          Bathing Goal 1 (OT)    Activity/Device (Bathing Goal 1, OT) upper body bathing  -RW     [place] Level/Cues Needed (Bathing Goal 1, OT) modified independence  -RW     Time Frame (Bathing Goal 1, OT) long term goal (LTG);by discharge  -RW     Progress/Outcomes (Bathing Goal 1, OT) goal not met  -[facility] Name 08/26/23 1105          Dressing Goal 1 (OT)    Activity/Device (Dressing Goal 1, OT) upper body dressing  -RW     [place]/Cues Needed (Dressing Goal 1, OT) modified independence  -RW      Time Frame (Dressing Goal 1, OT) long term goal (LTG);by discharge  -RW     Progress/Outcome (Dressing Goal 1, OT) goal not met  -       Row Name 08/26/23 1105          Strength Goal 1 (OT)    Strength Goal 1 (OT) Pt will perform BUE HEP with SBA and visual aid to increase strength for ADLs.  -RW     Time Frame (Strength Goal 1, OT) long term goal (LTG);by discharge  -RW     Progress/Outcome (Strength Goal 1, OT) goal not met  -RW       Row Name 08/26/23 1104          Therapy Assessment/Plan (OT)    Planned Therapy Interventions (OT) activity tolerance training;manual therapy/joint mobilization;patient/caregiver education/training;adaptive equipment training;neuromuscular control/coordination retraining;BADL retraining;ROM/therapeutic exercise;cognitive/visual perception retraining;occupation/activity based interventions;strengthening exercise;edema control/reduction;functional balance retraining;IADL retraining;passive ROM/stretching;transfer/mobility retraining  -               User Key  (r) = Recorded By, (t) = Taken By, (c) = Cosigned By      Initials Name Provider Type    RW Mireya Kamara OT Occupational Therapist                   Clinical Impression       Row Name 08/26/23 1105          Pain Assessment    Pretreatment Pain Rating 0/10 - no pain  -RW     Posttreatment Pain Rating 0/10 - no pain  -       Row Name 08/26/23 1100          Plan of Care Review    Plan of Care Reviewed With patient;son  -RW     Outcome Evaluation OT eval complete, co-eval with PT. Pt in fowlers upon arrival and agreeable to therapy. Pts son assisted with PLOF history, able to perform feeding/grooming, but requires max A for bathing and dressing. Pts BUE ROM was WFL. Pts  B shld 3+/5 grossly, B elbow flex 4/5 grossly. Pt perf sup>sit with Shelly with HOB raised. Pt perf sit<>stand with Shelly and FWW, brian to perform side steps and 4 steps forward/back with CGA and FWW. Pt perf sit>sup with mod A. Pt dependentx2 to scoot to HOB.  Pt demonstrated decreased independence with ADLs, stregnth, and transfer safety. Cont inpatient OT. Recommend return to SNF with continued therapy to increase independence with ADLs and transfer safety.  -RW       Row Name 08/26/23 1105          Therapy Assessment/Plan (OT)    Patient/Family Therapy Goal Statement (OT) to become more independent with ADLs.  -RW     Rehab Potential (OT) good, to achieve stated therapy goals  -RW     Criteria for Skilled Therapeutic Interventions Met (OT) yes;skilled treatment is necessary  -RW     Therapy Frequency (OT) other (see comments)  5-7 d/wk  -RW     Predicted Duration of Therapy Intervention (OT) until all goals met or d/c from hospital  -RW       Row Name 08/26/23 1105          Vital Signs    Pre Systolic BP Rehab 120  -RW     Pre Treatment Diastolic BP 71  -RW     Pretreatment Heart Rate (beats/min) 80  -RW     Pre SpO2 (%) 97  -RW     O2 Delivery Pre Treatment nasal cannula  3 LPM  -RW     Pre Patient Position Supine  -RW     Post Patient Position Supine  -RW       Row Name 08/26/23 1105          Positioning and Restraints    Pre-Treatment Position in bed  -RW     Post Treatment Position bed  -RW     In Bed notified nsg;supine;call light within reach;encouraged to call for assist;exit alarm on;with family/caregiver  -RW               User Key  (r) = Recorded By, (t) = Taken By, (c) = Cosigned By      Initials Name Provider Type    RW Mireya Kamara, OT Occupational Therapist                   Outcome Measures       Row Name 08/26/23 1105          How much help from another is currently needed...    Putting on and taking off regular lower body clothing? 1  -RW     Bathing (including washing, rinsing, and drying) 2  -RW     Toileting (which includes using toilet bed pan or urinal) 2  -RW     Putting on and taking off regular upper body clothing 3  -RW     Taking care of personal grooming (such as brushing teeth) 3  -RW     Eating meals 3  -RW     AM-PAC 6 Clicks Score  (OT) 14  -       Row Name 08/26/23 0824          How much help from another person do you currently need...    Turning from your back to your side while in flat bed without using bedrails? 3  -CH     Moving from lying on back to sitting on the side of a flat bed without bedrails? 3  -CH     Moving to and from a bed to a chair (including a wheelchair)? 2  -CH     Standing up from a chair using your arms (e.g., wheelchair, bedside chair)? 2  -CH     Climbing 3-5 steps with a railing? 1  -CH     To walk in hospital room? 1  -CH     AM-PAC 6 Clicks Score (PT) 12  -CH     Highest level of mobility 4 --> Transferred to chair/commode  -       Row Name 08/26/23 1105          Functional Assessment    Outcome Measure Options AM-PAC 6 Clicks Daily Activity (OT)  -               User Key  (r) = Recorded By, (t) = Taken By, (c) = Cosigned By      Initials Name Provider Type     Radha Noriega RN Registered Nurse    Mireya Romero, DARYN Occupational Therapist                    Occupational Therapy Education       Title: PT OT SLP Therapies (In Progress)       Topic: Occupational Therapy (In Progress)       Point: ADL training (Done)       Description:   Instruct learner(s) on proper safety adaptation and remediation techniques during self care or transfers.   Instruct in proper use of assistive devices.                  Learning Progress Summary             Patient Acceptance, E,TB, VU by  at 8/26/2023 1150    Comment: POC, Role of OT, transfer training                         Point: Home exercise program (Not Started)       Description:   Instruct learner(s) on appropriate technique for monitoring, assisting and/or progressing therapeutic exercises/activities.                  Learner Progress:  Not documented in this visit.              Point: Precautions (Done)       Description:   Instruct learner(s) on prescribed precautions during self-care and functional transfers.                  Learning Progress  Summary             Patient Acceptance, E,TB, VU by  at 8/26/2023 1150    Comment: POC, Role of OT, transfer training                         Point: Body mechanics (Done)       Description:   Instruct learner(s) on proper positioning and spine alignment during self-care, functional mobility activities and/or exercises.                  Learning Progress Summary             Patient Acceptance, E,TB, VU by  at 8/26/2023 1150    Comment: POC, Role of OT, transfer training                                         User Key       Initials Effective Dates Name Provider Type Discipline     09/22/22 -  Mireya Kamara OT Occupational Therapist OT                  OT Recommendation and Plan  Planned Therapy Interventions (OT): activity tolerance training, manual therapy/joint mobilization, patient/caregiver education/training, adaptive equipment training, neuromuscular control/coordination retraining, BADL retraining, ROM/therapeutic exercise, cognitive/visual perception retraining, occupation/activity based interventions, strengthening exercise, edema control/reduction, functional balance retraining, IADL retraining, passive ROM/stretching, transfer/mobility retraining  Therapy Frequency (OT): other (see comments) (5-7 d/wk)  Plan of Care Review  Plan of Care Reviewed With: patient, son  Outcome Evaluation: OT eval complete, co-eval with PT. Pt in fowlers upon arrival and agreeable to therapy. Pts son assisted with PLOF history, able to perform feeding/grooming, but requires max A for bathing and dressing. Pts BUE ROM was WFL. Pts  B shld 3+/5 grossly, B elbow flex 4/5 grossly. Pt perf sup>sit with Shelly with HOB raised. Pt perf sit<>stand with Shelly and FWW, brian to perform side steps and 4 steps forward/back with CGA and FWW. Pt perf sit>sup with mod A. Pt dependentx2 to scoot to HOB. Pt demonstrated decreased independence with ADLs, stregnth, and transfer safety. Cont inpatient OT. Recommend return to SNF with  continued therapy to increase independence with ADLs and transfer safety.     Time Calculation:   Evaluation Complexity (OT)  Review Occupational Profile/Medical/Therapy History Complexity: expanded/moderate complexity  Assessment, Occupational Performance/Identification of Deficit Complexity: 3-5 performance deficits  Clinical Decision Making Complexity (OT): detailed assessment/moderate complexity  Overall Complexity of Evaluation (OT): moderate complexity     Time Calculation- OT       Row Name 08/26/23 1151             Time Calculation- OT    OT Start Time 1104  -RW      OT Stop Time 1149  -RW      OT Time Calculation (min) 45 min  -RW      OT Received On 08/26/23  -RW      OT Goal Re-Cert Due Date 09/08/23  -RW         Untimed Charges    OT Eval/Re-eval Minutes 45  -RW         Total Minutes    Untimed Charges Total Minutes 45  -RW       Total Minutes 45  -RW                User Key  (r) = Recorded By, (t) = Taken By, (c) = Cosigned By      Initials Name Provider Type    RW Mireya Kamara OT Occupational Therapist                  Therapy Charges for Today       Code Description Service Date Service Provider Modifiers Qty    [id number] HC OT EVAL MOD COMPLEXITY 3 8/26/2023 Mireya Kamara OT GO 1                 Mireya Kamara OT  8/26/2023

## 2023-08-26 NOTE — PROGRESS NOTES
Asked to review patient's blood sugar control overnight.  Nursing reports suboptimal p.o. intake.  We will therefore change nighttime Levemir from 28 units to 15 units.    Blood Sugar in Office          8/15/2023    06:08 8/15/2023    10:31 8/15/2023    16:22 8/15/2023    20:14 8/16/2023    05:50 8/16/2023    10:25 8/25/2023    16:14   Blood Sugar in Office   Glucose 131  133  151  177  121  154  148

## 2023-08-26 NOTE — PROGRESS NOTES
[facility] Medicine Services  INPATIENT PROGRESS NOTE      Length of Stay: 0  Date of Admission: 8/25/2023  Primary Care Physician: Sean Briones MD    Subjective   Chief Complaint:   HPI:  The patient she is a 82-year-old female admitted to the hospital via the ER. The patient comes in on day of admission complaining of shortness of breath. Seen and evaluated in the ER by Dr. Golden. Chest x-ray was completed which did show central pulmonary congestion. Lasix was given in the ER with good results with urinary output. EKG does show atrial fibrillation with RVR. Patient was given dose of Cardizem IV 10 mg which did help the rate. The patient does take Cardizem p.o. at home we will continue during the admission. Echocardiogram will be ordered for evaluation for ejection fraction and structure of the heart and proper function. The patient has no fevers or chills. No chest pain. She is complaining of some heart palpitations and shortness of breath only. Troponin is noted to be mildly elevated initially at 26-second is 27 and her troponin T delta is 1. These numbers are probably related to demand ischemia from CHF exacerbation and atrial fibrillation.     Daily assessment 8/26/2023  Patient seen and examined.  Patient is stable on evaluation.  She is noted in sinus rhythm on telemetry.  Heart rate is in the 80s.  She is noted with some expiratory wheezes on auscultation of the lungs.  She has no chest pain.  No heart palpitations.  No fevers or chills.  No nausea or vomiting.  She does remain weak and short of breath on exertion.  No peripheral edema is noted on exam.  Vital signs are stable she is afebrile      Review of Systems   Constitutional:  Positive for fatigue. Negative for chills and fever.   HENT: Negative.  Negative for congestion.    Eyes: Negative.    Respiratory:  Positive for cough and shortness of breath.    Cardiovascular: Negative.  Negative for  chest pain and palpitations.   Gastrointestinal: Negative.  Negative for abdominal distention, abdominal pain, nausea and vomiting.   Endocrine: Negative.    Genitourinary: Negative.    Musculoskeletal: Negative.    Skin: Negative.    Neurological:  Positive for weakness. Negative for dizziness.   Hematological: Negative.    Psychiatric/Behavioral: Negative.        All pertinent negatives and positives are as above. All other systems have been reviewed and are negative unless otherwise stated.     Objective    As of today 08/26/23  Temp:  [97 °F (36.1 °C)-98.4 °F (36.9 °C)] 98.1 °F (36.7 °C)  Heart Rate:  [] 77  Resp:  [18-20] 18  BP: (127-137)/(55-82) 130/55    Physical Exam  Vitals and nursing note reviewed.   Constitutional:       Appearance: She is obese.   HENT:      Head: Normocephalic and atraumatic.      Right Ear: External ear normal.      Left Ear: External ear normal.      Nose: Nose normal.      Mouth/Throat:      Mouth: Mucous membranes are moist.      Pharynx: Oropharynx is clear.   Eyes:      General: No scleral icterus.     Extraocular Movements: Extraocular movements intact.      Conjunctiva/sclera: Conjunctivae normal.      Pupils: Pupils are equal, round, and reactive to light.   Cardiovascular:      Rate and Rhythm: Normal rate and regular rhythm.      Heart sounds: No murmur heard.  Pulmonary:      Effort: Pulmonary effort is normal.      Breath sounds: Wheezing present.   Abdominal:      General: Bowel sounds are normal.      Palpations: Abdomen is soft.   Musculoskeletal:         General: Normal range of motion.      Cervical back: Normal range of motion and neck supple.   Skin:     General: Skin is warm and dry.      Capillary Refill: Capillary refill takes less than 2 seconds.   Neurological:      General: No focal deficit present.      Mental Status: She is alert and oriented to person, place, and time.      Motor: Weakness present.   Psychiatric:         Mood and Affect: Mood normal.          Behavior: Behavior normal.         apixaban, 5 mg, Oral, BID  carvedilol, 12.5 mg, Oral, BID With Meals  dilTIAZem, 60 mg, Oral, Q8H  furosemide, 40 mg, Intravenous, Q12H  Insulin Aspart, 0-14 Units, Subcutaneous, TID AC  insulin detemir, 15 Units, Subcutaneous, Nightly  lisinopril, 40 mg, Oral, Daily  melatonin, 3 mg, Oral, Nightly  rosuvastatin, 20 mg, Oral, Nightly  senna-docusate sodium, 2 tablet, Oral, BID  sodium chloride, 10 mL, Intravenous, Q12H  ticagrelor, 90 mg, Oral, BID  venlafaxine, 37.5 mg, Oral, BID With Meals         Results Review:  I have reviewed the labs, radiology results, and diagnostic studies.    Laboratory Data:   Results from last 7 days   Lab Units 08/26/23  0539 08/25/23  0748   SODIUM mmol/L 141 137   POTASSIUM mmol/L 3.4* 5.1   CHLORIDE mmol/L 102 102   CO2 mmol/L 27.0 19.0*   BUN mg/dL 39* 33*   CREATININE mg/dL 1.60* 1.61*   GLUCOSE mg/dL 88 180*   CALCIUM mg/dL 8.4* 8.7   BILIRUBIN mg/dL 0.3 0.3   ALK PHOS U/L 143* 169*   ALT (SGPT) U/L 11 13   AST (SGOT) U/L 18 27   ANION GAP mmol/L 12.0 16.0*     Estimated Creatinine Clearance: 26.7 mL/min (A) (by C-G formula based on SCr of 1.6 mg/dL (H)).  Results from last 7 days   Lab Units 08/26/23  0539 08/25/23  1119 08/25/23  0748   MAGNESIUM mg/dL 2.0 2.3 2.3         Results from last 7 days   Lab Units 08/26/23  0539 08/25/23  0748   WBC 10*3/mm3 8.37 11.73*   HEMOGLOBIN g/dL 10.3* 11.3*   HEMATOCRIT % 31.4* 33.8*   PLATELETS 10*3/mm3 219 264           Culture Data:   No results found for: BLOODCX  No results found for: URINECX  No results found for: RESPCX  No results found for: WOUNDCX  No results found for: STOOLCX  No components found for: BODYFLD    Radiology Data:   Imaging Results (Last 24 Hours)       ** No results found for the last 24 hours. **            I have reviewed the patient's current medications.     Assessment/Plan     Principal Problem:    Atrial fibrillation with RVR  Active Problems:     A-fib        Impression/plan  CHF exacerbation systolic.  Admit to hospital  Lasix twice a day  Follow I's and O's Daily weights urine output.  Improving slowly.  Cardiac monitoring  Echocardiogram completed 8/11/2023    Left ventricular systolic function is moderately decreased. Left ventricular ejection fraction appears to be 36 - 40%.    Left ventricular wall thickness is consistent with mild concentric hypertrophy.    The following left ventricular wall segments are hypokinetic: mid anterior, apical anterior, apical lateral and mid inferolateral.    Left ventricular diastolic function was indeterminate.    The left atrial cavity is mildly dilated.    Estimated right ventricular systolic pressure from tricuspid regurgitation is normal (<35 mmHg).      Atrial fibrillation with RVR  Heart rate in the 70s now.  Atrial fibrillation rate controlled  Cardizem given in ER  Continue p.o. Cardizem.  Eliquis for anticoagulation     Hypertension  Current blood pressure 130/55  O2 saturation is 99% 3 L nasal cannula  Continue Coreg Zestril, and Cardizem     Diabetes mellitus type 2  A1c 7.6 TSH 1.4  ADA cardiac diet  Sliding scale  Continue insulin glargine        Coronary artery disease  Continue Brilinta, Coreg, Crestor     Hyperlipidemia  Continue rosuvastatin  Tolerating medication without any adverse side effects     Chronic kidney disease stage IIIb  BUN is 39 creatinine 1.6 and GFR is 32.1  We will continue to monitor kidney function.     Depression/anxiety  Continue Effexor     Deconditioning  PT OT     CODE STATUS full code  DVT prophylaxis Eliquis     Disposition: On evaluation the patient appears to be improving.  We will continue current medications and follow-up.  Possible discharge back to skilled nursing facility 48 hours     Medical Decision Making  Number and Complexity of problems: 4 complex medical problem  Differential Diagnosis: CHF exacerbation versus pneumonia     Conditions and Status:         Condition is unchanged.     [facility] Data  External documents reviewed: Cardiology heart  My EKG interpretation: A-fib with RVR  My plain film interpretation:  Chest x-ray completed 8/25/2023  Central vascular congestion.  Compatible with CHF  Tests considered but not ordered: None     Decision rules/scores evaluated (example [id number], Wells, etc): 5     Treatment Plan  As above     Care Planning  Shared decision making: Patient updated on current status and informed of proposed care plan; is in agreement with plan  Code status and discussions: Full code     Disposition  Social Determinants of Health that impact treatment or disposition: N/A  I expect the patient to be discharged to home in 48 to 72 hours     I confirmed that the patient's Advance Care Plan is present, code status is documented, or surrogate decision maker is listed in the patient's medical record.         The patient's surrogate decision maker is unknown     I discussed my findings and recommendations with the the ER physician and the patient and they agree to treatment plan     Estimated length of stay is 48 to 72 hours      Reid Escobar DO

## 2023-08-26 NOTE — PLAN OF CARE
Goal Outcome Evaluation:  Plan of Care Reviewed With: patient           Outcome Evaluation: PT evaluation completed as co-eval with OT. Patient presents alert to self and location only. On 3L O2 during assessment. Jason required going supine to sit and modA with sit to supine task. Jason required with sit to stand transfer with RW, CGA gait training x 3 steps sidestepping to the right at the EOB, CGA ambulating fwd/bwd x 2 steps each direction as well with RW. Limited by poor cognition, dyspnea, LE weakness and impaired with functional mobility. Would benefit from skilled PT to assist with improved independence with gait training, improve LE strength and reduce her fall risk with balance training. Anticipate d/c back to SNF with skilled PT to follow up there for added strengthening.      Anticipated Discharge Disposition (PT): skilled nursing facility

## 2023-08-27 LAB
ALBUMIN SERPL-MCNC: 2.9 G/DL (ref 3.5–5.2)
ALBUMIN/GLOB SERPL: 0.7 G/DL
ALP SERPL-CCNC: 142 U/L (ref 39–117)
ALT SERPL W P-5'-P-CCNC: 15 U/L (ref 1–33)
ANION GAP SERPL CALCULATED.3IONS-SCNC: 13 MMOL/L (ref 5–15)
AST SERPL-CCNC: 32 U/L (ref 1–32)
BASOPHILS # BLD AUTO: 0.1 10*3/MM3 (ref 0–0.2)
BASOPHILS NFR BLD AUTO: 1.3 % (ref 0–1.5)
BILIRUB SERPL-MCNC: 0.2 MG/DL (ref 0–1.2)
BUN SERPL-MCNC: 36 MG/DL (ref 8–23)
BUN/CREAT SERPL: 21.7 (ref 7–25)
CALCIUM SPEC-SCNC: 8.5 MG/DL (ref 8.6–10.5)
CHLORIDE SERPL-SCNC: 102 MMOL/L (ref 98–107)
CO2 SERPL-SCNC: 26 MMOL/L (ref 22–29)
CREAT SERPL-MCNC: 1.66 MG/DL (ref 0.57–1)
DEPRECATED RDW RBC AUTO: 45.7 FL (ref 37–54)
EGFRCR SERPLBLD CKD-EPI 2021: 30.7 ML/MIN/1.73
EOSINOPHIL # BLD AUTO: 0.16 10*3/MM3 (ref 0–0.4)
EOSINOPHIL NFR BLD AUTO: 2.1 % (ref 0.3–6.2)
ERYTHROCYTE [DISTWIDTH] IN BLOOD BY AUTOMATED COUNT: 13.6 % (ref 12.3–15.4)
GLOBULIN UR ELPH-MCNC: 4.2 GM/DL
GLUCOSE BLDC GLUCOMTR-MCNC: 116 MG/DL (ref 70–130)
GLUCOSE BLDC GLUCOMTR-MCNC: 145 MG/DL (ref 70–130)
GLUCOSE BLDC GLUCOMTR-MCNC: 212 MG/DL (ref 70–130)
GLUCOSE SERPL-MCNC: 122 MG/DL (ref 65–99)
HCT VFR BLD AUTO: 34.4 % (ref 34–46.6)
HGB BLD-MCNC: 10.9 G/DL (ref 12–15.9)
IMM GRANULOCYTES # BLD AUTO: 0.03 10*3/MM3 (ref 0–0.05)
IMM GRANULOCYTES NFR BLD AUTO: 0.4 % (ref 0–0.5)
LYMPHOCYTES # BLD AUTO: 1.42 10*3/MM3 (ref 0.7–3.1)
LYMPHOCYTES NFR BLD AUTO: 18.3 % (ref 19.6–45.3)
MAGNESIUM SERPL-MCNC: 1.9 MG/DL (ref 1.6–2.4)
MCH RBC QN AUTO: 28.8 PG (ref 26.6–33)
MCHC RBC AUTO-ENTMCNC: 31.7 G/DL (ref 31.5–35.7)
MCV RBC AUTO: 91 FL (ref 79–97)
MONOCYTES # BLD AUTO: 0.85 10*3/MM3 (ref 0.1–0.9)
MONOCYTES NFR BLD AUTO: 11 % (ref 5–12)
NEUTROPHILS NFR BLD AUTO: 5.18 10*3/MM3 (ref 1.7–7)
NEUTROPHILS NFR BLD AUTO: 66.9 % (ref 42.7–76)
NRBC BLD AUTO-RTO: 0 /100 WBC (ref 0–0.2)
PLATELET # BLD AUTO: 230 10*3/MM3 (ref 140–450)
PMV BLD AUTO: 10 FL (ref 6–12)
POTASSIUM SERPL-SCNC: 3.3 MMOL/L (ref 3.5–5.2)
PROT SERPL-MCNC: 7.1 G/DL (ref 6–8.5)
RBC # BLD AUTO: 3.78 10*6/MM3 (ref 3.77–5.28)
SODIUM SERPL-SCNC: 141 MMOL/L (ref 136–145)
WBC NRBC COR # BLD: 7.74 10*3/MM3 (ref 3.4–10.8)

## 2023-08-27 PROCEDURE — G0378 HOSPITAL OBSERVATION PER HR: HCPCS

## 2023-08-27 PROCEDURE — 97116 GAIT TRAINING THERAPY: CPT

## 2023-08-27 PROCEDURE — 97530 THERAPEUTIC ACTIVITIES: CPT

## 2023-08-27 PROCEDURE — 36415 COLL VENOUS BLD VENIPUNCTURE: CPT | Performed by: HOSPITALIST

## 2023-08-27 PROCEDURE — 82948 REAGENT STRIP/BLOOD GLUCOSE: CPT

## 2023-08-27 PROCEDURE — 80053 COMPREHEN METABOLIC PANEL: CPT | Performed by: HOSPITALIST

## 2023-08-27 PROCEDURE — 63710000001 INSULIN ASPART PER 5 UNITS: Performed by: HOSPITALIST

## 2023-08-27 PROCEDURE — 96376 TX/PRO/DX INJ SAME DRUG ADON: CPT

## 2023-08-27 PROCEDURE — 85025 COMPLETE CBC W/AUTO DIFF WBC: CPT | Performed by: HOSPITALIST

## 2023-08-27 PROCEDURE — 63710000001 INSULIN DETEMIR PER 5 UNITS: Performed by: INTERNAL MEDICINE

## 2023-08-27 PROCEDURE — 83735 ASSAY OF MAGNESIUM: CPT | Performed by: HOSPITALIST

## 2023-08-27 PROCEDURE — 25010000002 FUROSEMIDE PER 20 MG: Performed by: HOSPITALIST

## 2023-08-27 RX ORDER — MAGNESIUM SULFATE HEPTAHYDRATE 40 MG/ML
2 INJECTION, SOLUTION INTRAVENOUS ONCE
Status: CANCELLED | OUTPATIENT
Start: 2023-08-27 | End: 2023-08-27

## 2023-08-27 RX ORDER — FUROSEMIDE 20 MG/1
20 TABLET ORAL DAILY
Qty: 30 TABLET | Refills: 0 | Status: SHIPPED | OUTPATIENT
Start: 2023-08-27 | End: 2023-09-26

## 2023-08-27 RX ORDER — POTASSIUM CHLORIDE 750 MG/1
10 TABLET, FILM COATED, EXTENDED RELEASE ORAL EVERY 24 HOURS
Qty: 30 TABLET | Refills: 0 | Status: SHIPPED | OUTPATIENT
Start: 2023-08-27 | End: 2023-09-26

## 2023-08-27 RX ORDER — POTASSIUM CHLORIDE 750 MG/1
40 CAPSULE, EXTENDED RELEASE ORAL ONCE
Status: COMPLETED | OUTPATIENT
Start: 2023-08-27 | End: 2023-08-27

## 2023-08-27 RX ADMIN — FUROSEMIDE 40 MG: 10 INJECTION, SOLUTION INTRAMUSCULAR; INTRAVENOUS at 12:10

## 2023-08-27 RX ADMIN — DILTIAZEM HYDROCHLORIDE 60 MG: 60 TABLET, FILM COATED ORAL at 05:59

## 2023-08-27 RX ADMIN — Medication 10 ML: at 21:37

## 2023-08-27 RX ADMIN — MELATONIN 3 MG: 3 TAB ORAL at 21:38

## 2023-08-27 RX ADMIN — DOCUSATE SODIUM 50 MG AND SENNOSIDES 8.6 MG 2 TABLET: 8.6; 5 TABLET, FILM COATED ORAL at 08:27

## 2023-08-27 RX ADMIN — INSULIN ASPART 5 UNITS: 100 INJECTION, SOLUTION INTRAVENOUS; SUBCUTANEOUS at 12:03

## 2023-08-27 RX ADMIN — VENLAFAXINE 37.5 MG: 37.5 TABLET ORAL at 18:12

## 2023-08-27 RX ADMIN — Medication 10 ML: at 08:35

## 2023-08-27 RX ADMIN — VENLAFAXINE 37.5 MG: 37.5 TABLET ORAL at 08:31

## 2023-08-27 RX ADMIN — TICAGRELOR 90 MG: 90 TABLET ORAL at 08:27

## 2023-08-27 RX ADMIN — CARVEDILOL 12.5 MG: 12.5 TABLET, FILM COATED ORAL at 18:12

## 2023-08-27 RX ADMIN — APIXABAN 5 MG: 5 TABLET, FILM COATED ORAL at 00:47

## 2023-08-27 RX ADMIN — CARVEDILOL 12.5 MG: 12.5 TABLET, FILM COATED ORAL at 08:31

## 2023-08-27 RX ADMIN — ROSUVASTATIN CALCIUM 20 MG: 10 TABLET, FILM COATED ORAL at 21:38

## 2023-08-27 RX ADMIN — POTASSIUM CHLORIDE 40 MEQ: 750 CAPSULE, EXTENDED RELEASE ORAL at 12:10

## 2023-08-27 RX ADMIN — DILTIAZEM HYDROCHLORIDE 60 MG: 60 TABLET, FILM COATED ORAL at 15:03

## 2023-08-27 RX ADMIN — DILTIAZEM HYDROCHLORIDE 60 MG: 60 TABLET, FILM COATED ORAL at 21:37

## 2023-08-27 RX ADMIN — TICAGRELOR 90 MG: 90 TABLET ORAL at 21:38

## 2023-08-27 RX ADMIN — FUROSEMIDE 40 MG: 10 INJECTION, SOLUTION INTRAMUSCULAR; INTRAVENOUS at 23:35

## 2023-08-27 RX ADMIN — APIXABAN 5 MG: 5 TABLET, FILM COATED ORAL at 12:10

## 2023-08-27 RX ADMIN — INSULIN DETEMIR 15 UNITS: 100 INJECTION, SOLUTION SUBCUTANEOUS at 21:46

## 2023-08-27 RX ADMIN — APIXABAN 5 MG: 5 TABLET, FILM COATED ORAL at 23:35

## 2023-08-27 NOTE — PLAN OF CARE
Goal Outcome Evaluation:              Outcome Evaluation: Pt sleeping at this time; IV Lasix held per MD due to lower B/P overnight; plan of care continues

## 2023-08-27 NOTE — PROGRESS NOTES
[facility] Medicine Services  INPATIENT PROGRESS NOTE      Length of Stay: 0  Date of Admission: 8/25/2023  Primary Care Physician: Sean Briones MD    Subjective   Chief Complaint:   HPI:  The patient she is a 82-year-old female admitted to the hospital via the ER. The patient comes in on day of admission complaining of shortness of breath. Seen and evaluated in the ER by Dr. Golden. Chest x-ray was completed which did show central pulmonary congestion. Lasix was given in the ER with good results with urinary output. EKG does show atrial fibrillation with RVR. Patient was given dose of Cardizem IV 10 mg which did help the rate. The patient does take Cardizem p.o. at home we will continue during the admission. Echocardiogram will be ordered for evaluation for ejection fraction and structure of the heart and proper function. The patient has no fevers or chills. No chest pain. She is complaining of some heart palpitations and shortness of breath only. Troponin is noted to be mildly elevated initially at 26-second is 27 and her troponin T delta is 1. These numbers are probably related to demand ischemia from CHF exacerbation and atrial fibrillation.     Daily assessment 8/27/2023  Upon evaluation today the patient is lying in bed.  Awake and alert.  Appears stable for discharge.  Skilled nursing facility indicates they cannot take the patient back until tomorrow to the facility.  We will hold discharge until that time.  No new issues or complaints.  No nausea vomiting fevers chills no chest pain headache or dizziness.      Review of Systems   Constitutional:  Positive for fatigue. Negative for chills and fever.   HENT: Negative.  Negative for congestion.    Eyes: Negative.    Respiratory:  Positive for shortness of breath.    Cardiovascular: Negative.  Negative for chest pain and palpitations.   Gastrointestinal: Negative.  Negative for abdominal distention,  abdominal pain, nausea and vomiting.   Endocrine: Negative.    Genitourinary: Negative.    Musculoskeletal: Negative.    Skin: Negative.    Neurological:  Positive for weakness. Negative for dizziness.   Hematological: Negative.    Psychiatric/Behavioral: Negative.        All pertinent negatives and positives are as above. All other systems have been reviewed and are negative unless otherwise stated.     Objective    As of today 08/27/23  Temp:  [97.2 °F (36.2 °C)-98.4 °F (36.9 °C)] 98.1 °F (36.7 °C)  Heart Rate:  [] 82  Resp:  [16-18] 16  BP: (110-144)/(55-75) 110/63    Physical Exam  Vitals and nursing note reviewed.   Constitutional:       Appearance: She is obese.   HENT:      Head: Normocephalic and atraumatic.      Right Ear: External ear normal.      Left Ear: External ear normal.      Nose: Nose normal.      Mouth/Throat:      Mouth: Mucous membranes are moist.      Pharynx: Oropharynx is clear.   Eyes:      General: No scleral icterus.     Extraocular Movements: Extraocular movements intact.      Conjunctiva/sclera: Conjunctivae normal.      Pupils: Pupils are equal, round, and reactive to light.   Cardiovascular:      Rate and Rhythm: Normal rate and regular rhythm.      Heart sounds: No murmur heard.  Pulmonary:      Effort: Pulmonary effort is normal.      Breath sounds: Wheezing present.   Abdominal:      General: Bowel sounds are normal.      Palpations: Abdomen is soft.   Musculoskeletal:         General: Normal range of motion.      Cervical back: Normal range of motion and neck supple.   Skin:     General: Skin is warm and dry.      Capillary Refill: Capillary refill takes less than 2 seconds.   Neurological:      General: No focal deficit present.      Mental Status: She is alert and oriented to person, place, and time.      Motor: Weakness present.   Psychiatric:         Mood and Affect: Mood normal.         Behavior: Behavior normal.         apixaban, 5 mg, Oral, BID  carvedilol, 12.5 mg,  Oral, BID With Meals  dilTIAZem, 60 mg, Oral, Q8H  furosemide, 40 mg, Intravenous, Q12H  Insulin Aspart, 0-14 Units, Subcutaneous, TID AC  insulin detemir, 15 Units, Subcutaneous, Nightly  lisinopril, 40 mg, Oral, Daily  melatonin, 3 mg, Oral, Nightly  potassium chloride, 40 mEq, Oral, Once  rosuvastatin, 20 mg, Oral, Nightly  senna-docusate sodium, 2 tablet, Oral, BID  sodium chloride, 10 mL, Intravenous, Q12H  ticagrelor, 90 mg, Oral, BID  venlafaxine, 37.5 mg, Oral, BID With Meals         Results Review:  I have reviewed the labs, radiology results, and diagnostic studies.    Laboratory Data:   Results from last 7 days   Lab Units 08/27/23  0642 08/26/23  0539 08/25/23  0748   SODIUM mmol/L 141 141 137   POTASSIUM mmol/L 3.3* 3.4* 5.1   CHLORIDE mmol/L 102 102 102   CO2 mmol/L 26.0 27.0 19.0*   BUN mg/dL 36* 39* 33*   CREATININE mg/dL 1.66* 1.60* 1.61*   GLUCOSE mg/dL 122* 88 180*   CALCIUM mg/dL 8.5* 8.4* 8.7   BILIRUBIN mg/dL 0.2 0.3 0.3   ALK PHOS U/L 142* 143* 169*   ALT (SGPT) U/L 15 11 13   AST (SGOT) U/L 32 18 27   ANION GAP mmol/L 13.0 12.0 16.0*       Estimated Creatinine Clearance: 25.7 mL/min (A) (by C-G formula based on SCr of 1.66 mg/dL (H)).  Results from last 7 days   Lab Units 08/27/23  0642 08/26/23  0539 08/25/23  1119   MAGNESIUM mg/dL 1.9 2.0 2.3           Results from last 7 days   Lab Units 08/27/23  0642 08/26/23  0539 08/25/23  0748   WBC 10*3/mm3 7.74 8.37 11.73*   HEMOGLOBIN g/dL 10.9* 10.3* 11.3*   HEMATOCRIT % 34.4 31.4* 33.8*   PLATELETS 10*3/mm3 230 219 264             Culture Data:   No results found for: BLOODCX  No results found for: URINECX  No results found for: RESPCX  No results found for: WOUNDCX  No results found for: STOOLCX  No components found for: BODYFLD    Radiology Data:   Imaging Results (Last 24 Hours)       ** No results found for the last 24 hours. **            I have reviewed the patient's current medications.     Assessment/Plan     Principal Problem:    Atrial  fibrillation with RVR  Active Problems:    A-fib        Impression/plan  CHF exacerbation systolic.  Admit to hospital  Lasix twice a day  Follow I's and O's Daily weights urine output.  Improving slowly.  Cardiac monitoring  Echocardiogram completed 8/11/2023    Left ventricular systolic function is moderately decreased. Left ventricular ejection fraction appears to be 36 - 40%.    Left ventricular wall thickness is consistent with mild concentric hypertrophy.    The following left ventricular wall segments are hypokinetic: mid anterior, apical anterior, apical lateral and mid inferolateral.    Left ventricular diastolic function was indeterminate.    The left atrial cavity is mildly dilated.    Estimated right ventricular systolic pressure from tricuspid regurgitation is normal (<35 mmHg).      Atrial fibrillation with RVR  Heart rate in the 70s now.  Atrial fibrillation rate controlled  Cardizem given in ER  Continue p.o. Cardizem.  Eliquis for anticoagulation     Hypertension  Current blood pressure 110/63  O2 saturation is 95% 1 L nasal cannula  Continue Coreg Zestril, and Cardizem     Diabetes mellitus type 2  A1c 7.6 TSH 1.4  ADA cardiac diet  Sliding scale  Continue insulin glargine        Coronary artery disease  Continue Brilinta, Coreg, Crestor     Hyperlipidemia  Continue rosuvastatin  Tolerating medication without any adverse side effects     Chronic kidney disease stage IIIb  BUN is 36 creatinine 1.6 and GFR is 30.7  We will continue to monitor kidney function.     Depression/anxiety  Continue Effexor     Deconditioning  PT OT     CODE STATUS full code  DVT prophylaxis Eliquis     Disposition: Currently stable for discharge.  Skilled nursing facility unable to accept patient until the a.m.  We will be stable for discharge at that time.     Medical Decision Making  Number and Complexity of problems: 4 complex medical problem  Differential Diagnosis: CHF exacerbation versus pneumonia     Conditions and  Status:        Condition is unchanged.     [facility] Data  External documents reviewed: Cardiology heart  My EKG interpretation: A-fib with RVR  My plain film interpretation:  Chest x-ray completed 8/25/2023  Central vascular congestion.  Compatible with CHF  Tests considered but not ordered: None     Decision rules/scores evaluated (example [id number], Wells, etc): 5     Treatment Plan  As above     Care Planning  Shared decision making: Patient updated on current status and informed of proposed care plan; is in agreement with plan  Code status and discussions: Full code     Disposition  Social Determinants of Health that impact treatment or disposition: N/A  I expect the patient to be discharged to home in 48 to 72 hours     I confirmed that the patient's Advance Care Plan is present, code status is documented, or surrogate decision maker is listed in the patient's medical record.         The patient's surrogate decision maker is unknown     I discussed my findings and recommendations with the the ER physician and the patient and they agree to treatment plan     Estimated length of stay is 48 to 72 hours      Reid Escobar DO

## 2023-08-27 NOTE — PLAN OF CARE
Goal Outcome Evaluation:            Pt VSS.  Pt now on room air and tolerating well.  No complaints of pain or discomfort at this time.  Will continue with plan of care.

## 2023-08-27 NOTE — PLAN OF CARE
Goal Outcome Evaluation:  Plan of Care Reviewed With: patient           Outcome Evaluation: sup-sit min of 1,sit-stand-sit min of 1 and vc's to bring rw back with her when backing up to the chair instead of parking it to the side;amb with rw and min of 1: 13'      Anticipated Discharge Disposition (PT): skilled nursing facility

## 2023-08-27 NOTE — THERAPY TREATMENT NOTE
Acute Care - Physical Therapy Treatment Note  [facility]     Patient Name: Magy Barton  : 1940  MRN: [id number]  Today's Date: 2023      Visit Dx:     ICD-10-CM ICD-9-CM   1. Acute on chronic congestive heart failure, unspecified heart failure type  I50.9 428.0   2. Atrial fibrillation with RVR  I48.91 427.31   3. Impaired mobility and ADLs [Z74.09, Z78.9 (ICD-10-CM)]  Z74.09 V49.89    Z78.9    4. Impaired functional mobility, balance, gait, and endurance [Z74.09 (ICD-10-CM)]  Z74.09 V49.89     Patient Active Problem List   Diagnosis    Urinary tract infection with hematuria    Colitis    Non-STEMI (non-ST elevated myocardial infarction)    Atrial fibrillation with rapid ventricular response    Atrial fibrillation with RVR    A-fib    Atrial fibrillation    Chronic systolic heart failure     Past Medical History:   Diagnosis Date    Cancer     leukemia    Coronary artery disease     Diabetes mellitus     Hypertension     Injury of back     Myocardial infarction     Stroke      Past Surgical History:   Procedure Laterality Date    CARDIAC CATHETERIZATION Left 2022    Procedure: Left Heart Cath;  Surgeon: Ayaz Wild DO;  Location: [facility] CATH INVASIVE LOCATION;  Service: Cardiology;  Laterality: Left;    CARDIAC CATHETERIZATION N/A 2022    Procedure: PERCUTANEOUS CORONARY INTERVENTION;  Surgeon: Ayaz Wild DO;  Location: [facility] CATH INVASIVE LOCATION;  Service: Cardiology;  Laterality: N/A;    CATARACT EXTRACTION      TRIGEMINAL NERVE DECOMPRESSION      TUBAL ABDOMINAL LIGATION      UVULOPALATOPHARYNGOPLASTY       PT Assessment (last 12 hours)       PT Evaluation and Treatment       Row Name 23 1522          Physical Therapy Time and Intention    Subjective Information complains of;fatigue  -LN     Document Type therapy note (daily note)  -LN     Mode of Treatment physical therapy  -LN       Row Name 23 1522          General Information    Patient Profile Reviewed  yes  -LN     Existing Precautions/Restrictions fall;other (see comments)  confusion, early dementia per pt's son  -LN       Row Name 23 152          Pain    Pretreatment Pain Rating 0/10 - no pain  -LN     Posttreatment Pain Rating 0/10 - no pain  -LN       Row Name 23 152          Cognition    Orientation Status (Cognition) oriented to;person    -LN       Row Name 23 152          Range of Motion Comprehensive    General Range of Motion bilateral lower extremity ROM WFL  -LN       Row Name 23 152          Bed Mobility    Bed Mobility supine-sit;sit-supine;scooting/bridging  -LN     Rolling Left [place] (Bed Mobility) minimum assist (75% patient effort)  -LN     Scooting/Bridging [place] (Bed Mobility) dependent (less than 25% patient effort);2 person assist  -LN     Supine-Sit [place] (Bed Mobility) minimum assist (75% patient effort)  -LN     Sit-Supine [place] (Bed Mobility) not tested  -LN     Assistive Device (Bed Mobility) draw sheet;head of bed elevated  -LN       Row Name 23          Sit-Stand Transfer    Sit-Stand [place] (Transfers) minimum assist (75% patient effort)  -LN     Assistive Device (Sit-Stand Transfers) walker, front-wheeled  -LN       Row Name 23 152          Stand-Sit Transfer    Stand-Sit [place] (Transfers) contact guard;verbal cues  -LN     Assistive Device (Stand-Sit Transfers) walker, front-wheeled  -LN       Row Name 23 152          Gait/Stairs (Locomotion)    [place] Level (Gait) contact guard  -LN     Assistive Device (Gait) walker, front-wheeled  -LN     Distance in Feet (Gait) 13'  -LN     Deviations/Abnormal Patterns (Gait) base of support, wide  -LN     Bilateral Gait Deviations forward flexed posture  -LN     [place] Level (Stairs) not tested  -LN       Row Name 23 152          Plan of Care Review    Plan of Care Reviewed With patient  -LN     Outcome Evaluation sup-sit min of  1,sit-stand-sit min of 1 and vc's to bring rw back with her when backing up to the chair instead of parking it to the side;amb with rw and min of 1: 13'  -LN       Row Name 08/27/23 1522          Vital Signs    Pre Systolic BP Rehab 126  -LN     Pre Treatment Diastolic BP 76  -LN     Post Systolic BP Rehab 145  -LN     Post Treatment Diastolic BP 64  -LN     Pretreatment Heart Rate (beats/min) 103  -LN     Posttreatment Heart Rate (beats/min) 97  -LN     Pre SpO2 (%) 97  -LN     O2 Delivery Pre Treatment room air  -LN     Post SpO2 (%) 96  -LN     Pre Patient Position Supine  -LN     Intra Patient Position Standing  -LN     Post Patient Position Sitting  -LN       Row Name 08/27/23 1522          Positioning and Restraints    Post Treatment Position chair  -LN     In Chair notified nsg;reclined;call light within reach;encouraged to call for assist;exit alarm on;legs elevated  -LN       Row Name 08/27/23 1522          Therapy Assessment/Plan (PT)    Rehab Potential (PT) fair, will monitor progress closely  -LN     Criteria for Skilled Interventions Met (PT) yes;meets criteria;skilled treatment is necessary  -LN     Therapy Frequency (PT) other (see comments)  5-7d/week  -LN       Row Name 08/27/23 1522          Bed Mobility Goal 1 (PT)    Activity/Assistive Device (Bed Mobility Goal 1, PT) sit to supine/supine to sit;scooting  -LN     [place] Level/Cues Needed (Bed Mobility Goal 1, PT) standby assist  -LN     Time Frame (Bed Mobility Goal 1, PT) by discharge  -LN     Strategies/Barriers (Bed Mobility Goal 1, PT) confusion, LE weakness  -LN     Progress/Outcomes (Bed Mobility Goal 1, PT) new goal  -LN       Row Name 08/27/23 1522          Transfer Goal 1 (PT)    Activity/Assistive Device (Transfer Goal 1, PT) sit-to-stand/stand-to-sit;bed-to-chair/chair-to-bed;walker, rolling  -LN     [place] Level/Cues Needed (Transfer Goal 1, PT) contact guard required  -LN     Time Frame (Transfer Goal 1, PT) by discharge   -LN       Row Name 08/27/23 1522          Gait Training Goal 1 (PT)    Activity/Assistive Device (Gait Training Goal 1, PT) gait (walking locomotion);assistive device use;decrease fall risk;improve balance and speed;increase endurance/gait distance;walker, rolling  -LN     [place] Level (Gait Training Goal 1, PT) contact guard required  -LN     Distance (Gait Training Goal 1, PT) 15' x 2  -LN     Time Frame (Gait Training Goal 1, PT) by discharge  -LN     Strategies/Barriers (Gait Training Goal 1, PT) confusion/dementia  -LN               User Key  (r) = Recorded By, (t) = Taken By, (c) = Cosigned By      Initials Name Provider Type    LN Mlia Yates PTA Physical Therapist Assistant                    Physical Therapy Education       Title: PT OT SLP Therapies (In Progress)       Topic: Physical Therapy (Not Started)       Point: Mobility training (Not Started)       Learner Progress:  Not documented in this visit.              Point: Home exercise program (Not Started)       Learner Progress:  Not documented in this visit.              Point: Body mechanics (Not Started)       Learner Progress:  Not documented in this visit.              Point: Precautions (Not Started)       Learner Progress:  Not documented in this visit.                                  PT Recommendation and Plan  Anticipated Discharge Disposition (PT): skilled nursing facility  Therapy Frequency (PT): other (see comments) (5-7d/week)  Plan of Care Reviewed With: patient  Outcome Evaluation: sup-sit min of 1,sit-stand-sit min of 1 and vc's to bring rw back with her when backing up to the chair instead of parking it to the side;amb with rw and min of 1: 13'   Outcome Measures       Row Name 08/27/23 1522             How much help from another person do you currently need...    Turning from your back to your side while in flat bed without using bedrails? 3  -LN      Moving from lying on back to sitting on the side of a flat bed without  bedrails? 3  -LN      Moving to and from a bed to a chair (including a wheelchair)? 3  -LN      Standing up from a chair using your arms (e.g., wheelchair, bedside chair)? 3  -LN      Climbing 3-5 steps with a railing? 2  -LN      To walk in hospital room? 3  -LN      AM-PAC 6 Clicks Score (PT) 17  -LN         Functional Assessment    Outcome Measure Options AM-PAC 6 Clicks Daily Activity (OT)  -LN                User Key  (r) = Recorded By, (t) = Taken By, (c) = Cosigned By      Initials Name Provider Type    Mila Glass PTA Physical Therapist Assistant                     Time Calculation:    PT Charges       Row Name 08/27/23 1737             Time Calculation    Start Time 1522  -LN      Stop Time 1550  -LN      Time Calculation (min) 28 min  -LN      PT Received On 08/27/23  -LN         Time Calculation- PT    Total Timed Code Minutes- PT 28 minute(s)  -LN                User Key  (r) = Recorded By, (t) = Taken By, (c) = Cosigned By      Initials Name Provider Type    Mila Glass PTA Physical Therapist Assistant                  Therapy Charges for Today       Code Description Service Date Service Provider Modifiers Qty    88274641077 HC GAIT TRAINING EA 15 MIN 8/27/2023 Mila Yates PTA GP 1    32574241243 HC PT THERAPEUTIC ACT EA 15 MIN 8/27/2023 Mila Yates PTA GP 1            PT G-Codes  Outcome Measure Options: AM-PAC 6 Clicks Daily Activity (OT)  AM-PAC 6 Clicks Score (PT): 17  AM-PAC 6 Clicks Score (OT): 14    Mila Yates PTA  8/27/2023

## 2023-08-28 VITALS
DIASTOLIC BLOOD PRESSURE: 58 MMHG | SYSTOLIC BLOOD PRESSURE: 115 MMHG | HEIGHT: 62 IN | WEIGHT: 176.5 LBS | OXYGEN SATURATION: 96 % | TEMPERATURE: 97.5 F | BODY MASS INDEX: 32.48 KG/M2 | HEART RATE: 72 BPM | RESPIRATION RATE: 16 BRPM

## 2023-08-28 LAB
ALBUMIN SERPL-MCNC: 3.3 G/DL (ref 3.5–5.2)
ALBUMIN/GLOB SERPL: 0.7 G/DL
ALP SERPL-CCNC: 152 U/L (ref 39–117)
ALT SERPL W P-5'-P-CCNC: 22 U/L (ref 1–33)
ANION GAP SERPL CALCULATED.3IONS-SCNC: 10 MMOL/L (ref 5–15)
AST SERPL-CCNC: 35 U/L (ref 1–32)
BASOPHILS # BLD AUTO: 0.09 10*3/MM3 (ref 0–0.2)
BASOPHILS NFR BLD AUTO: 1.1 % (ref 0–1.5)
BILIRUB SERPL-MCNC: 0.2 MG/DL (ref 0–1.2)
BUN SERPL-MCNC: 34 MG/DL (ref 8–23)
BUN/CREAT SERPL: 22.4 (ref 7–25)
CALCIUM SPEC-SCNC: 8.8 MG/DL (ref 8.6–10.5)
CHLORIDE SERPL-SCNC: 100 MMOL/L (ref 98–107)
CO2 SERPL-SCNC: 32 MMOL/L (ref 22–29)
CREAT SERPL-MCNC: 1.52 MG/DL (ref 0.57–1)
DEPRECATED RDW RBC AUTO: 45.6 FL (ref 37–54)
EGFRCR SERPLBLD CKD-EPI 2021: 34.1 ML/MIN/1.73
EOSINOPHIL # BLD AUTO: 0.21 10*3/MM3 (ref 0–0.4)
EOSINOPHIL NFR BLD AUTO: 2.6 % (ref 0.3–6.2)
ERYTHROCYTE [DISTWIDTH] IN BLOOD BY AUTOMATED COUNT: 13.7 % (ref 12.3–15.4)
GLOBULIN UR ELPH-MCNC: 4.5 GM/DL
GLUCOSE BLDC GLUCOMTR-MCNC: 101 MG/DL (ref 70–130)
GLUCOSE BLDC GLUCOMTR-MCNC: 233 MG/DL (ref 70–130)
GLUCOSE BLDC GLUCOMTR-MCNC: 98 MG/DL (ref 70–130)
GLUCOSE SERPL-MCNC: 75 MG/DL (ref 65–99)
HCT VFR BLD AUTO: 34.8 % (ref 34–46.6)
HGB BLD-MCNC: 11.3 G/DL (ref 12–15.9)
IMM GRANULOCYTES # BLD AUTO: 0.04 10*3/MM3 (ref 0–0.05)
IMM GRANULOCYTES NFR BLD AUTO: 0.5 % (ref 0–0.5)
LYMPHOCYTES # BLD AUTO: 2 10*3/MM3 (ref 0.7–3.1)
LYMPHOCYTES NFR BLD AUTO: 25.2 % (ref 19.6–45.3)
MAGNESIUM SERPL-MCNC: 1.8 MG/DL (ref 1.6–2.4)
MCH RBC QN AUTO: 29.9 PG (ref 26.6–33)
MCHC RBC AUTO-ENTMCNC: 32.5 G/DL (ref 31.5–35.7)
MCV RBC AUTO: 92.1 FL (ref 79–97)
MONOCYTES # BLD AUTO: 0.88 10*3/MM3 (ref 0.1–0.9)
MONOCYTES NFR BLD AUTO: 11.1 % (ref 5–12)
NEUTROPHILS NFR BLD AUTO: 4.72 10*3/MM3 (ref 1.7–7)
NEUTROPHILS NFR BLD AUTO: 59.5 % (ref 42.7–76)
NRBC BLD AUTO-RTO: 0 /100 WBC (ref 0–0.2)
PLATELET # BLD AUTO: 244 10*3/MM3 (ref 140–450)
PMV BLD AUTO: 9.9 FL (ref 6–12)
POTASSIUM SERPL-SCNC: 3.1 MMOL/L (ref 3.5–5.2)
PROT SERPL-MCNC: 7.8 G/DL (ref 6–8.5)
RBC # BLD AUTO: 3.78 10*6/MM3 (ref 3.77–5.28)
SODIUM SERPL-SCNC: 142 MMOL/L (ref 136–145)
WBC NRBC COR # BLD: 7.94 10*3/MM3 (ref 3.4–10.8)

## 2023-08-28 PROCEDURE — 96365 THER/PROPH/DIAG IV INF INIT: CPT

## 2023-08-28 PROCEDURE — 96366 THER/PROPH/DIAG IV INF ADDON: CPT

## 2023-08-28 PROCEDURE — 83735 ASSAY OF MAGNESIUM: CPT | Performed by: HOSPITALIST

## 2023-08-28 PROCEDURE — G0378 HOSPITAL OBSERVATION PER HR: HCPCS

## 2023-08-28 PROCEDURE — 80053 COMPREHEN METABOLIC PANEL: CPT | Performed by: HOSPITALIST

## 2023-08-28 PROCEDURE — 82948 REAGENT STRIP/BLOOD GLUCOSE: CPT

## 2023-08-28 PROCEDURE — 97535 SELF CARE MNGMENT TRAINING: CPT

## 2023-08-28 PROCEDURE — 97110 THERAPEUTIC EXERCISES: CPT

## 2023-08-28 PROCEDURE — 25010000002 MAGNESIUM SULFATE 2 GM/50ML SOLUTION: Performed by: HOSPITALIST

## 2023-08-28 PROCEDURE — 85025 COMPLETE CBC W/AUTO DIFF WBC: CPT | Performed by: HOSPITALIST

## 2023-08-28 RX ORDER — MAGNESIUM SULFATE HEPTAHYDRATE 40 MG/ML
2 INJECTION, SOLUTION INTRAVENOUS ONCE
Status: COMPLETED | OUTPATIENT
Start: 2023-08-28 | End: 2023-08-28

## 2023-08-28 RX ORDER — POTASSIUM CHLORIDE 1.5 G/1.58G
40 POWDER, FOR SOLUTION ORAL ONCE
Status: COMPLETED | OUTPATIENT
Start: 2023-08-28 | End: 2023-08-28

## 2023-08-28 RX ADMIN — POTASSIUM CHLORIDE 40 MEQ: 1.5 POWDER, FOR SOLUTION ORAL at 09:34

## 2023-08-28 RX ADMIN — APIXABAN 5 MG: 5 TABLET, FILM COATED ORAL at 11:50

## 2023-08-28 RX ADMIN — LISINOPRIL 40 MG: 20 TABLET ORAL at 08:50

## 2023-08-28 RX ADMIN — TICAGRELOR 90 MG: 90 TABLET ORAL at 08:49

## 2023-08-28 RX ADMIN — CARVEDILOL 12.5 MG: 12.5 TABLET, FILM COATED ORAL at 08:50

## 2023-08-28 RX ADMIN — VENLAFAXINE 37.5 MG: 37.5 TABLET ORAL at 08:49

## 2023-08-28 RX ADMIN — Medication 10 ML: at 08:50

## 2023-08-28 RX ADMIN — DILTIAZEM HYDROCHLORIDE 60 MG: 60 TABLET, FILM COATED ORAL at 05:22

## 2023-08-28 RX ADMIN — MAGNESIUM SULFATE HEPTAHYDRATE 2 G: 40 INJECTION, SOLUTION INTRAVENOUS at 08:49

## 2023-08-28 NOTE — PLAN OF CARE
Goal Outcome Evaluation:      Pt awake during the night; all medications administered; pt has been OOB for toileting; VSS; plan of care continues

## 2023-08-28 NOTE — THERAPY TREATMENT NOTE
Patient Name: Magy Barton  : 1940    MRN: [id number]                              Today's Date: 2023       Admit Date: 2023    Visit Dx:     ICD-10-CM ICD-9-CM   1. Acute on chronic congestive heart failure, unspecified heart failure type  I50.9 428.0   2. Atrial fibrillation with RVR  I48.91 427.31   3. Impaired mobility and ADLs [Z74.09, Z78.9 (ICD-10-CM)]  Z74.09 V49.89    Z78.9    4. Impaired functional mobility, balance, gait, and endurance [Z74.09 (ICD-10-CM)]  Z74.09 V49.89     Patient Active Problem List   Diagnosis    Urinary tract infection with hematuria    Colitis    Non-STEMI (non-ST elevated myocardial infarction)    Atrial fibrillation with rapid ventricular response    Atrial fibrillation with RVR    A-fib    Atrial fibrillation    Chronic systolic heart failure     Past Medical History:   Diagnosis Date    Cancer     leukemia    Coronary artery disease     Diabetes mellitus     Hypertension     Injury of back     Myocardial infarction     Stroke      Past Surgical History:   Procedure Laterality Date    CARDIAC CATHETERIZATION Left 2022    Procedure: Left Heart Cath;  Surgeon: Ayaz Wild DO;  Location: [facility] CATH INVASIVE LOCATION;  Service: Cardiology;  Laterality: Left;    CARDIAC CATHETERIZATION N/A 2022    Procedure: PERCUTANEOUS CORONARY INTERVENTION;  Surgeon: Ayaz Wild DO;  Location: [facility] CATH INVASIVE LOCATION;  Service: Cardiology;  Laterality: N/A;    CATARACT EXTRACTION      TRIGEMINAL NERVE DECOMPRESSION      TUBAL ABDOMINAL LIGATION      UVULOPALATOPHARYNGOPLASTY        General Information       Row Name 23 0906          OT Time and Intention    Document Type therapy note (daily note)  -KD     Mode of Treatment individual therapy;occupational therapy  -KD       Row Name 23 0906          General Information    Patient Profile Reviewed yes  -KD     Existing Precautions/Restrictions fall;other (see comments)  -KD       Row Name  08/28/23 0906          Cognition    Orientation Status (Cognition) oriented to;person  -KD               User Key  (r) = Recorded By, (t) = Taken By, (c) = Cosigned By      Initials Name Provider Type    KD Stephanie Mcpherson COTA Occupational Therapist Assistant                     Mobility/ADL's       Row Name 08/28/23 0906          Bed Mobility    Supine-Sit [place] (Bed Mobility) contact guard  -KD     Sit-Supine [place] (Bed Mobility) not tested  -KD       Row Name 08/28/23 0906          Sit-Stand Transfer    Sit-Stand [place] (Transfers) minimum assist (75% patient effort)  -KD     Assistive Device (Sit-Stand Transfers) walker, front-wheeled  -KD       Row Name 08/28/23 0906          Stand-Sit Transfer    Stand-Sit [place] (Transfers) contact guard;verbal cues  -KD     Assistive Device (Stand-Sit Transfers) walker, front-wheeled  -KD       Row Name 08/28/23 0906          Functional Mobility    Functional Mobility- Ind. Level contact guard assist  -KD     Functional Mobility-Distance (Feet) 3  -KD       Row Name 08/28/23 0906          Activities of Daily Living    BADL Assessment/Intervention bathing;lower body dressing;upper body dressing;grooming  -KD       Row Name 08/28/23 0906          Upper Body Dressing Assessment/Training    [place] Level (Upper Body Dressing) upper body dressing skills;doff;don;standby assist  -KD     Position (Upper Body Dressing) edge of bed sitting  -KD       Row Name 08/28/23 0906          Lower Body Dressing Assessment/Training    [place] Level (Lower Body Dressing) don;socks;minimum assist (75% patient effort)  -KD     Position (Lower Body Dressing) edge of bed sitting  -KD       Row Name 08/28/23 0906          Bathing Assessment/Intervention    [place] Level (Bathing) bathing skills;lower body;upper body;upper extremities  -KD     Assistive Devices (Bathing) bath mitt  -KD     Position (Bathing) edge of bed sitting  -KD       Row Name 08/28/23  0906          Grooming Assessment/Training    [place] Level (Grooming) grooming skills;hair care, combing/brushing;wash face, hands;standby assist  -KD     Position (Grooming) edge of bed sitting  -KD               User Key  (r) = Recorded By, (t) = Taken By, (c) = Cosigned By      Initials Name Provider Type    Stephanie Carl COTA Occupational Therapist Assistant                   Obj/Interventions    No documentation.                  Goals/Plan       Row Name 08/28/23 0906          Transfer Goal 1 (OT)    Activity/Assistive Device (Transfer Goal 1, OT) transfers, all  -KD     [place] Level/Cues Needed (Transfer Goal 1, OT) modified independence  -KD     Time Frame (Transfer Goal 1, OT) long term goal (LTG);by discharge  -KD     Progress/Outcome (Transfer Goal 1, OT) goal not met  -KD       Row Name 08/28/23 0906          Bathing Goal 1 (OT)    Activity/Device (Bathing Goal 1, OT) upper body bathing  -KD     [place] Level/Cues Needed (Bathing Goal 1, OT) modified independence  -KD     Time Frame (Bathing Goal 1, OT) long term goal (LTG);by discharge  -KD     Progress/Outcomes (Bathing Goal 1, OT) goal not met  -KD       Row Name 08/28/23 0906          Dressing Goal 1 (OT)    Activity/Device (Dressing Goal 1, OT) upper body dressing  -KD     [place]/Cues Needed (Dressing Goal 1, OT) modified independence  -KD     Time Frame (Dressing Goal 1, OT) long term goal (LTG);by discharge  -KD     Progress/Outcome (Dressing Goal 1, OT) goal not met  -KD       Row Name 08/28/23 0906          Strength Goal 1 (OT)    Strength Goal 1 (OT) Pt will perform BUE HEP with SBA and visual aid to increase strength for ADLs.  -KD     Time Frame (Strength Goal 1, OT) long term goal (LTG);by discharge  -KD     Progress/Outcome (Strength Goal 1, OT) goal not met  -KD               User Key  (r) = Recorded By, (t) = Taken By, (c) = Cosigned By      Initials Name Provider Type    Stephanie Carl COTA Occupational  Therapist Assistant                   Clinical Impression       Row Name 08/28/23 0906          Pain Assessment    Pretreatment Pain Rating 4/10  -KD     Posttreatment Pain Rating 4/10  -KD     Pre/Posttreatment Pain Comment IV site  -KD     Pain Intervention(s) Nursing Notified  -KD       Row Name 08/28/23 0906          Therapy Assessment/Plan (OT)    Therapy Frequency (OT) other (see comments)  5-7 d/wk  -KD       Row Name 08/28/23 0906          Vital Signs    Pre Systolic BP Rehab 121  -KD     Pre Treatment Diastolic BP 69  -KD     Pretreatment Heart Rate (beats/min) 76  -KD     Pre SpO2 (%) 97  -KD     O2 Delivery Pre Treatment room air  -KD     Pre Patient Position Supine  -KD     Intra Patient Position Standing  -KD     Post Patient Position Sitting  -KD       Row Name 08/28/23 0906          Positioning and Restraints    Pre-Treatment Position in bed  -KD     Post Treatment Position chair  -KD     In Chair sitting;call light within reach;encouraged to call for assist;exit alarm on  -KD               User Key  (r) = Recorded By, (t) = Taken By, (c) = Cosigned By      Initials Name Provider Type    KD Stephanie Mcpherson COTA Occupational Therapist Assistant                   Outcome Measures       Row Name 08/28/23 0906          How much help from another is currently needed...    Putting on and taking off regular lower body clothing? 3  -KD     Bathing (including washing, rinsing, and drying) 3  -KD     Toileting (which includes using toilet bed pan or urinal) 2  -KD     Putting on and taking off regular upper body clothing 3  -KD     Taking care of personal grooming (such as brushing teeth) 4  -KD     Eating meals 4  -KD     AM-PAC 6 Clicks Score (OT) 19  -KD       Row Name 08/28/23 0740          How much help from another person do you currently need...    Turning from your back to your side while in flat bed without using bedrails? 3  -KDA     Moving from lying on back to sitting on the side of a flat bed  without bedrails? 3  -KDA     Moving to and from a bed to a chair (including a wheelchair)? 3  -KDA     Standing up from a chair using your arms (e.g., wheelchair, bedside chair)? 3  -KDA     Climbing 3-5 steps with a railing? 2  -KDA     To walk in hospital room? 3  -KDA     AM-PAC 6 Clicks Score (PT) 17  -KDA     Highest level of mobility 5 --> Static standing  -KDA               User Key  (r) = Recorded By, (t) = Taken By, (c) = Cosigned By      Initials Name Provider Type    Stephanie Carl COTA Occupational Therapist Assistant    KDA Mallory Peters, RN Registered Nurse                    Occupational Therapy Education       Title: PT OT SLP Therapies (In Progress)       Topic: Occupational Therapy (In Progress)       Point: ADL training (Done)       Description:   Instruct learner(s) on proper safety adaptation and remediation techniques during self care or transfers.   Instruct in proper use of assistive devices.                  Learning Progress Summary             Patient Acceptance, E,TB, VU by RW at 8/26/2023 1150    Comment: POC, Role of OT, transfer training                         Point: Home exercise program (Not Started)       Description:   Instruct learner(s) on appropriate technique for monitoring, assisting and/or progressing therapeutic exercises/activities.                  Learner Progress:  Not documented in this visit.              Point: Precautions (Done)       Description:   Instruct learner(s) on prescribed precautions during self-care and functional transfers.                  Learning Progress Summary             Patient Acceptance, E,TB, VU by RW at 8/26/2023 1150    Comment: POC, Role of OT, transfer training                         Point: Body mechanics (Done)       Description:   Instruct learner(s) on proper positioning and spine alignment during self-care, functional mobility activities and/or exercises.                  Learning Progress Summary             Patient  Acceptance, E,TB, VU by  at 8/26/2023 1150    Comment: POC, Role of OT, transfer training                                         User Key       Initials Effective Dates Name Provider Type Discipline     09/22/22 -  Mireya Kamara, DARYN Occupational Therapist OT                  OT Recommendation and Plan  Therapy Frequency (OT): other (see comments) (5-7 d/wk)        Time Calculation:         Time Calculation- OT       Row Name 08/28/23 0906             Time Calculation- OT    OT Start Time 0906  -KD      OT Stop Time 0950  -KD      OT Time Calculation (min) 44 min  -KD      Total Timed Code Minutes- OT 44 minute(s)  -KD      OT Received On 08/28/23  -KD         Timed Charges    28717 - OT Self Care/Mgmt Minutes 44  -KD         Total Minutes    Timed Charges Total Minutes 44  -KD       Total Minutes 44  -KD                User Key  (r) = Recorded By, (t) = Taken By, (c) = Cosigned By      Initials Name Provider Type     Stephanie Mcpherson COTA Occupational Therapist Assistant                  Therapy Charges for Today       Code Description Service Date Service Provider Modifiers Qty    36710224152 HC OT SELF CARE/MGMT/TRAIN EA 15 MIN 8/28/2023 Stephanie Mcpherson COTA GO 3                 SHERRI Mckenzie  8/28/2023

## 2023-08-28 NOTE — DISCHARGE SUMMARY
[facility] Medicine Services  DISCHARGE SUMMARY       Date of Admission: 8/25/2023  Date of Discharge:  8/28/2023  Primary Care Physician: Sean Briones MD    Presenting Problem/History of Present Illness:  A-fib [I48.91]  Atrial fibrillation with RVR [I48.91]  Acute on chronic congestive heart failure, unspecified heart failure type [I50.9]   Final Discharge Diagnoses:  Active Hospital Problems    Diagnosis     **Atrial fibrillation with RVR     A-fib        Consults:   Consults       Date and Time Order Name Status Description    8/25/2023 12:31 PM Hospitalist (on-call MD unless specified)      8/11/2023  7:24 AM Inpatient Urology Consult Completed           Pertinent Test Results:   Lab Results (last 24 hours)       Procedure Component Value Units Date/Time    Magnesium [[id number]]  (Normal) Collected: 08/28/23 0616    Specimen: Blood Updated: 08/28/23 0737     Magnesium 1.8 mg/dL     Comprehensive Metabolic Panel [[id number]]  (Abnormal) Collected: 08/28/23 0616    Specimen: Blood Updated: 08/28/23 0737     Glucose 75 mg/dL      BUN 34 mg/dL      Creatinine 1.52 mg/dL      Sodium 142 mmol/L      Potassium 3.1 mmol/L      Chloride 100 mmol/L      CO2 32.0 mmol/L      Calcium 8.8 mg/dL      Total Protein 7.8 g/dL      Albumin 3.3 g/dL      ALT (SGPT) 22 U/L      AST (SGOT) 35 U/L      Alkaline Phosphatase 152 U/L      Total Bilirubin 0.2 mg/dL      Globulin 4.5 gm/dL      A/G Ratio 0.7 g/dL      BUN/Creatinine Ratio 22.4     Anion Gap 10.0 mmol/L      eGFR 34.1 mL/min/1.73     Narrative:      GFR Normal >60  Chronic Kidney Disease <60  Kidney Failure <15    The GFR formula is only valid for adults with stable renal function between ages 18 and 70.    CBC & Differential [[id number]]  (Abnormal) Collected: 08/28/23 0616    Specimen: Blood Updated: 08/28/23 0717    Narrative:      The following orders were created for panel order CBC & Differential.  Procedure                               Abnormality          Status                     ---------                               -----------         ------                     CBC Auto Differential[[id number]]        Abnormal            Final result                 Please view results for these tests on the individual orders.    CBC Auto Differential [[id number]]  (Abnormal) Collected: 08/28/23 0616    Specimen: Blood Updated: 08/28/23 0717     WBC 7.94 10*3/mm3      RBC 3.78 10*6/mm3      Hemoglobin 11.3 g/dL      Hematocrit 34.8 %      MCV 92.1 fL      MCH 29.9 pg      MCHC 32.5 g/dL      RDW 13.7 %      RDW-SD 45.6 fl      MPV 9.9 fL      Platelets 244 10*3/mm3      Neutrophil % 59.5 %      Lymphocyte % 25.2 %      Monocyte % 11.1 %      Eosinophil % 2.6 %      Basophil % 1.1 %      Immature Grans % 0.5 %      Neutrophils, Absolute 4.72 10*3/mm3      Lymphocytes, Absolute 2.00 10*3/mm3      Monocytes, Absolute 0.88 10*3/mm3      Eosinophils, Absolute 0.21 10*3/mm3      Basophils, Absolute 0.09 10*3/mm3      Immature Grans, Absolute 0.04 10*3/mm3      nRBC 0.0 /100 WBC     POC Glucose Once [[id number]]  (Normal) Collected: 08/28/23 0604    Specimen: Blood Updated: 08/28/23 0639     Glucose 98 mg/dL      Comment: RN NotifiedOperator: [id number] SALCIDO FAITHMeter ID: [id number]       POC Glucose Once [[id number]]  (Abnormal) Collected: 08/27/23 1959    Specimen: Blood Updated: 08/28/23 0404     Glucose 233 mg/dL      Comment: RN NotifiedOperator: [id number] SALCIDO FAITHMeter ID: [id number]       POC Glucose Once [[id number]]  (Normal) Collected: 08/27/23 1614    Specimen: Blood Updated: 08/27/23 1629     Glucose 116 mg/dL      Comment: RN NotifiedOperator: [id number] Hayward CIERRAMeter ID: [id number]       POC Glucose Once [[id number]]  (Abnormal) Collected: 08/27/23 1050    Specimen: Blood Updated: 08/27/23 1111     Glucose 212 mg/dL      Comment: RN NotifiedOperator: [id number] ADRIANA WHITEICAMeter ID: [id number]             Imaging Results (Last 24 Hours)        "** No results found for the last 24 hours. **          Chief Complaint on Day of Discharge: No complaints    Hospital Course:  This is an 82-year-old female with PMH of CAD, DM2, HTN, and stroke that presented to [facility] on 8/25/2023 with complaints of shortness of breath. Chest x-ray showed central pulmonary congestion. Lasix was given in the ER with good results with urinary output. EKG showed atrial fibrillation with RVR. Patient was given dose of Cardizem IV 10 mg which did help the rate. The patient takes Cardizem p.o. at home and that was continued.     Echocardiogram results:    Left ventricular systolic function is moderately decreased. Left ventricular ejection fraction appears to be 36 - 40%.    Left ventricular wall thickness is consistent with mild concentric hypertrophy.    The following left ventricular wall segments are hypokinetic: mid anterior, apical anterior, apical lateral and mid inferolateral.    Left ventricular diastolic function was indeterminate.    The left atrial cavity is mildly dilated.    Estimated right ventricular systolic pressure from tricuspid regurgitation is normal (<35 mmHg).     The patient was started on IV lasix, strict I&Os, daily weights and fluid/salt restrictions.  The patient diuresed 6 lbs.  She was accepted to [facility].  Follow with PCP and cardiology in one week.     Condition on Discharge:  Stable.     Physical Exam on Discharge:  /69 (BP Location: Right arm, Patient Position: Lying)   Pulse 76   Temp 97.7 °F (36.5 °C) (Temporal)   Resp 16   Ht 157.5 cm (62\")   Wt 80.1 kg (176 lb 8 oz)   SpO2 97%   BMI 32.28 kg/m²   Physical Exam  Constitutional:       Appearance: She is well-developed.   HENT:      Head: Normocephalic and atraumatic.   Eyes:      Pupils: Pupils are equal, round, and reactive to light.   Cardiovascular:      Rate and Rhythm: Normal rate and regular rhythm.   Pulmonary:      Effort: Pulmonary effort is normal.      Breath sounds: Normal " breath sounds.   Abdominal:      General: Bowel sounds are normal.      Palpations: Abdomen is soft.   Musculoskeletal:         General: Normal range of motion.      Cervical back: Normal range of motion and neck supple.   Skin:     General: Skin is warm and dry.   Neurological:      Mental Status: She is alert and oriented to person, place, and time.   Psychiatric:         Behavior: Behavior normal.         Discharge Disposition:  Skilled Nursing Facility (DC - External)    Discharge Medications:     Discharge Medications        New Medications        Instructions Start Date   furosemide 20 MG tablet  Commonly known as: Lasix   20 mg, Oral, Daily      potassium chloride 10 MEQ CR tablet   10 mEq, Oral, Every 24 Hours             Changes to Medications        Instructions Start Date   rosuvastatin 20 MG tablet  Commonly known as: CRESTOR  What changed: how much to take   20 mg, Oral, Nightly             Continue These Medications        Instructions Start Date   acetaminophen 325 MG tablet  Commonly known as: TYLENOL   650 mg, Oral, Every 6 Hours PRN      albuterol (2.5 MG/3ML) 0.083% nebulizer solution  Commonly known as: PROVENTIL   2.5 mg, Nebulization, Every 6 Hours PRN      apixaban 5 MG tablet tablet  Commonly known as: ELIQUIS   5 mg, Oral, 2 Times Daily      BASAGLAR KWIKPEN 100 UNIT/ML injection pen   28 Units, Subcutaneous, Nightly      carBAMazepine 200 MG tablet  Commonly known as: TEGretol   200 mg, Oral, 2 Times Daily      carvedilol 12.5 MG tablet  Commonly known as: COREG   12.5 mg, Oral, 2 Times Daily With Meals      dilTIAZem 60 MG tablet  Commonly known as: CARDIZEM   60 mg, Oral, Every 8 Hours Scheduled      DOCUSATE SODIUM PO   200 mg, Oral, 2 Times Daily      Glucagon 1 MG injection  Commonly known as: GLUCAGEN   1 mg, Subcutaneous, Once As Needed      lisinopril 40 MG tablet  Commonly known as: PRINIVIL,ZESTRIL   40 mg, Oral, Daily      melatonin 3 MG tablet   3 mg, Oral, Nightly, For  insomnia      Mirabegron ER 50 MG tablet sustained-release 24 hour 24 hr tablet  Commonly known as: MYRBETRIQ   50 mg, Oral, Daily      nitroglycerin 0.4 MG SL tablet  Commonly known as: NITROSTAT   No dose, route, or frequency recorded.      polyethylene glycol 17 GM/SCOOP powder  Commonly known as: MIRALAX   17 g, Oral, Daily PRN      ticagrelor 90 MG tablet tablet  Commonly known as: BRILINTA   90 mg, Oral, 2 Times Daily      venlafaxine 37.5 MG tablet  Commonly known as: EFFEXOR   37.5 mg, Oral, 2 Times Daily               Discharge Diet:   Diet Instructions       Diet: Cardiac Diets, Diabetic Diets; Healthy Heart (2-3 Na+); Soft to Chew (NDD 3); Ground Meat; Thin (IDDSI 0); Consistent Carbohydrate      Discharge Diet:  Cardiac Diets  Diabetic Diets       Cardiac Diet: Healthy Heart (2-3 Na+)    Texture: Soft to Chew (NDD 3)    Soft to Chew: Ground Meat    Fluid Consistency: Thin (IDDSI 0)    Diabetic Diet: Consistent Carbohydrate    Diet: Cardiac Diets, Diabetic Diets; Low Sodium (2g); Regular Texture (IDDSI 7); Thin (IDDSI 0); Consistent Carbohydrate      Discharge Diet:  Cardiac Diets  Diabetic Diets       Cardiac Diet: Low Sodium (2g)    Texture: Regular Texture (IDDSI 7)    Fluid Consistency: Thin (IDDSI 0)    Diabetic Diet: Consistent Carbohydrate            Activity at Discharge:   Activity Instructions       Activity as Tolerated      Activity as Tolerated              Discharge Care Plan/Instructions: As above.     Follow-up Appointment:  Additional Instructions for the Follow-ups that You Need to Schedule       Discharge Follow-up with PCP   As directed       Currently Documented PCP:    Sean Briones MD    PCP Phone Number:    [phone]     Follow Up Details: 1week               Contact information for after-discharge care       Destination       [facility] .    Service: Skilled Nursing  Contact information:  [address] [place] [id number] [phone]                                    The patient has current or prior documentation of left ventricular ejection fraction (LVEF) less than or equal to 40%, or moderate or severely depressed left ventricular systolic function. ; The patient was prescribed or already taking an Angiotensin-Converting Enzyme (ACE) Inhibitor or Angiotensin Receptor Blocker (ARB) and The patient was prescribed or already taking a beta-blocker.      This document has been electronically signed by MIKA Jama on August 28, 2023 09:35 CDT        Time: Greater than 30 minutes.

## 2023-08-28 NOTE — THERAPY TREATMENT NOTE
Patient Name: Magy Barton  : 1940    MRN: [id number]                              Today's Date: 2023       Admit Date: 2023    Visit Dx:     ICD-10-CM ICD-9-CM   1. Acute on chronic congestive heart failure, unspecified heart failure type  I50.9 428.0   2. Atrial fibrillation with RVR  I48.91 427.31   3. Impaired mobility and ADLs [Z74.09, Z78.9 (ICD-10-CM)]  Z74.09 V49.89    Z78.9    4. Impaired functional mobility, balance, gait, and endurance [Z74.09 (ICD-10-CM)]  Z74.09 V49.89     Patient Active Problem List   Diagnosis    Urinary tract infection with hematuria    Colitis    Non-STEMI (non-ST elevated myocardial infarction)    Atrial fibrillation with rapid ventricular response    Atrial fibrillation with RVR    A-fib    Atrial fibrillation    Chronic systolic heart failure     Past Medical History:   Diagnosis Date    Cancer     leukemia    Coronary artery disease     Diabetes mellitus     Hypertension     Injury of back     Myocardial infarction     Stroke      Past Surgical History:   Procedure Laterality Date    CARDIAC CATHETERIZATION Left 2022    Procedure: Left Heart Cath;  Surgeon: Ayaz Wild DO;  Location: [facility] CATH INVASIVE LOCATION;  Service: Cardiology;  Laterality: Left;    CARDIAC CATHETERIZATION N/A 2022    Procedure: PERCUTANEOUS CORONARY INTERVENTION;  Surgeon: Ayaz Wild DO;  Location: [facility] CATH INVASIVE LOCATION;  Service: Cardiology;  Laterality: N/A;    CATARACT EXTRACTION      TRIGEMINAL NERVE DECOMPRESSION      TUBAL ABDOMINAL LIGATION      UVULOPALATOPHARYNGOPLASTY        General Information       Row Name 23 1347 23 0906       OT Time and Intention    Document Type therapy note (daily note)  -KD therapy note (daily note)  -KD    Mode of Treatment individual therapy;occupational therapy  -KD individual therapy;occupational therapy  -KD      Row Name 23 1347 23 0906       General Information    Patient Profile Reviewed  yes  -KD yes  -KD    Existing Precautions/Restrictions fall  -KD fall;other (see comments)  -KD      Row Name 08/28/23 1347 08/28/23 0906       Cognition    Orientation Status (Cognition) oriented to;person  -KD oriented to;person  -KD              User Key  (r) = Recorded By, (t) = Taken By, (c) = Cosigned By      Initials Name Provider Type     Stephanie Mcpherson COTA Occupational Therapist Assistant                     Mobility/ADL's       Row Name 08/28/23 0906          Bed Mobility    Supine-Sit [place] (Bed Mobility) contact guard  -KD     Sit-Supine [place] (Bed Mobility) not tested  -KD       Row Name 08/28/23 0906          Sit-Stand Transfer    Sit-Stand [place] (Transfers) minimum assist (75% patient effort)  -KD     Assistive Device (Sit-Stand Transfers) walker, front-wheeled  -KD       Row Name 08/28/23 0906          Stand-Sit Transfer    Stand-Sit [place] (Transfers) contact guard;verbal cues  -KD     Assistive Device (Stand-Sit Transfers) walker, front-wheeled  -KD       Row Name 08/28/23 0906          Functional Mobility    Functional Mobility- Ind. Level contact guard assist  -KD     Functional Mobility-Distance (Feet) 3  -KD       Row Name 08/28/23 0906          Activities of Daily Living    BADL Assessment/Intervention bathing;lower body dressing;upper body dressing;grooming  -KD       Row Name 08/28/23 0906          Upper Body Dressing Assessment/Training    [place] Level (Upper Body Dressing) upper body dressing skills;doff;don;standby assist  -KD     Position (Upper Body Dressing) edge of bed sitting  -KD       Row Name 08/28/23 0906          Lower Body Dressing Assessment/Training    [place] Level (Lower Body Dressing) don;socks;minimum assist (75% patient effort)  -KD     Position (Lower Body Dressing) edge of bed sitting  -KD       Row Name 08/28/23 0906          Bathing Assessment/Intervention    [place] Level (Bathing) bathing skills;lower body;upper  body;upper extremities  -     Assistive Devices (Bathing) bath mitt  -     Position (Bathing) edge of bed sitting  -       Row Name 08/28/23 0906          Grooming Assessment/Training    [place] Level (Grooming) grooming skills;hair care, combing/brushing;wash face, hands;standby assist  -     Position (Grooming) edge of bed sitting  -               User Key  (r) = Recorded By, (t) = Taken By, (c) = Cosigned By      Initials Name Provider Type    Stephanie Carl COTA Occupational Therapist Assistant                   Obj/Interventions       Row Name 08/28/23 1347          Shoulder (Therapeutic Exercise)    Shoulder (Therapeutic Exercise) AROM (active range of motion)  -     Shoulder AROM (Therapeutic Exercise) bilateral;flexion;extension;aBduction;aDduction;external rotation;internal rotation  -       Row Name 08/28/23 1347          Elbow/Forearm (Therapeutic Exercise)    Elbow/Forearm (Therapeutic Exercise) AROM (active range of motion)  -     Elbow/Forearm AROM (Therapeutic Exercise) bilateral;flexion;extension;supination;pronation  -       Row Name 08/28/23 1347          Wrist (Therapeutic Exercise)    Wrist (Therapeutic Exercise) AROM (active range of motion)  -     Wrist AROM (Therapeutic Exercise) bilateral;flexion;extension  -       Row Name 08/28/23 1347          Hand (Therapeutic Exercise)    Hand (Therapeutic Exercise) AROM (active range of motion)  -     Hand AROM/AAROM (Therapeutic Exercise) bilateral;finger flexion;finger extension  -       Row Name 08/28/23 1347          Motor Skills    Therapeutic Exercise shoulder;elbow/forearm;wrist;hand  -               User Key  (r) = Recorded By, (t) = Taken By, (c) = Cosigned By      Initials Name Provider Type    Stephanie Carl COTA Occupational Therapist Assistant                   Goals/Plan       Row Name 08/28/23 1347 08/28/23 0906       Transfer Goal 1 (OT)    Activity/Assistive Device (Transfer Goal 1, OT)  transfers, all  -KD transfers, all  -KD    [place] Level/Cues Needed (Transfer Goal 1, OT) modified independence  -KD modified independence  -KD    Time Frame (Transfer Goal 1, OT) long term goal (LTG);by discharge  -KD long term goal (LTG);by discharge  -KD    Progress/Outcome (Transfer Goal 1, OT) goal not met  -KD goal not met  -KD      Row Name 08/28/23 1347 08/28/23 0906       Bathing Goal 1 (OT)    Activity/Device (Bathing Goal 1, OT) upper body bathing  -KD upper body bathing  -KD    [place] Level/Cues Needed (Bathing Goal 1, OT) modified independence  -KD modified independence  -KD    Time Frame (Bathing Goal 1, OT) long term goal (LTG);by discharge  -KD long term goal (LTG);by discharge  -KD    Progress/Outcomes (Bathing Goal 1, OT) goal not met  -KD goal not met  -KD      Row Name 08/28/23 1347 08/28/23 0906       Dressing Goal 1 (OT)    Activity/Device (Dressing Goal 1, OT) upper body dressing  -KD upper body dressing  -KD    [place]/Cues Needed (Dressing Goal 1, OT) modified independence  -KD modified independence  -KD    Time Frame (Dressing Goal 1, OT) long term goal (LTG);by discharge  -KD long term goal (LTG);by discharge  -KD    Progress/Outcome (Dressing Goal 1, OT) goal not met  -KD goal not met  -KD      Row Name 08/28/23 1347 08/28/23 0906       Strength Goal 1 (OT)    Strength Goal 1 (OT) Pt will perform BUE HEP with SBA and visual aid to increase strength for ADLs.  -KD Pt will perform BUE HEP with SBA and visual aid to increase strength for ADLs.  -KD    Time Frame (Strength Goal 1, OT) long term goal (LTG);by discharge  -KD long term goal (LTG);by discharge  -KD    Progress/Outcome (Strength Goal 1, OT) goal not met  -KD goal not met  -KD              User Key  (r) = Recorded By, (t) = Taken By, (c) = Cosigned By      Initials Name Provider Type     Stephanie Mcpherson COTA Occupational Therapist Assistant                   Clinical Impression       Row Name 08/28/23 1341  08/28/23 0906       Pain Assessment    Pretreatment Pain Rating 0/10 - no pain  -KD 4/10  -KD    Posttreatment Pain Rating 0/10 - no pain  -KD 4/10  -KD    Pre/Posttreatment Pain Comment -- IV site  -KD    Pain Intervention(s) -- Nursing Notified  -KD      Row Name 08/28/23 1347 08/28/23 0906       Therapy Assessment/Plan (OT)    Therapy Frequency (OT) other (see comments)  5-7 d/wk  -KD other (see comments)  5-7 d/wk  -KD      Row Name 08/28/23 1347 08/28/23 0906       Vital Signs    Pre Systolic BP Rehab 115  -  -KD    Pre Treatment Diastolic BP 58  -KD 69  -KD    Pretreatment Heart Rate (beats/min) 72  -KD 76  -KD    Pre SpO2 (%) 96  -KD 97  -KD    O2 Delivery Pre Treatment room air  -KD room air  -KD    Pre Patient Position Sitting  -KD Supine  -KD    Intra Patient Position Sitting  -KD Standing  -KD    Post Patient Position Sitting  -KD Sitting  -KD      Row Name 08/28/23 1347 08/28/23 0906       Positioning and Restraints    Pre-Treatment Position sitting in chair/recliner  -KD in bed  -KD    Post Treatment Position chair  -KD chair  -KD    In Chair sitting;call light within reach;encouraged to call for assist;exit alarm on  -KD sitting;call light within reach;encouraged to call for assist;exit alarm on  -KD              User Key  (r) = Recorded By, (t) = Taken By, (c) = Cosigned By      Initials Name Provider Type    KD Stephanie Mcpherson COTA Occupational Therapist Assistant                   Outcome Measures       Row Name 08/28/23 0906          How much help from another is currently needed...    Putting on and taking off regular lower body clothing? 3  -KD     Bathing (including washing, rinsing, and drying) 3  -KD     Toileting (which includes using toilet bed pan or urinal) 2  -KD     Putting on and taking off regular upper body clothing 3  -KD     Taking care of personal grooming (such as brushing teeth) 4  -KD     Eating meals 4  -KD     AM-PAC 6 Clicks Score (OT) 19  -KD       Row Name 08/28/23  0740          How much help from another person do you currently need...    Turning from your back to your side while in flat bed without using bedrails? 3  -KDA     Moving from lying on back to sitting on the side of a flat bed without bedrails? 3  -KDA     Moving to and from a bed to a chair (including a wheelchair)? 3  -KDA     Standing up from a chair using your arms (e.g., wheelchair, bedside chair)? 3  -KDA     Climbing 3-5 steps with a railing? 2  -KDA     To walk in hospital room? 3  -KDA     AM-PAC 6 Clicks Score (PT) 17  -KDA     Highest level of mobility 5 --> Static standing  -KDA               User Key  (r) = Recorded By, (t) = Taken By, (c) = Cosigned By      Initials Name Provider Type    Stephanie Carl COTA Occupational Therapist Assistant    KDA Mallory Peters, RN Registered Nurse                    Occupational Therapy Education       Title: PT OT SLP Therapies (In Progress)       Topic: Occupational Therapy (In Progress)       Point: ADL training (Done)       Description:   Instruct learner(s) on proper safety adaptation and remediation techniques during self care or transfers.   Instruct in proper use of assistive devices.                  Learning Progress Summary             Patient Acceptance, E,TB, VU by RW at 8/26/2023 1150    Comment: POC, Role of OT, transfer training                         Point: Home exercise program (Not Started)       Description:   Instruct learner(s) on appropriate technique for monitoring, assisting and/or progressing therapeutic exercises/activities.                  Learner Progress:  Not documented in this visit.              Point: Precautions (Done)       Description:   Instruct learner(s) on prescribed precautions during self-care and functional transfers.                  Learning Progress Summary             Patient Acceptance, E,TB, VU by RW at 8/26/2023 1150    Comment: POC, Role of OT, transfer training                         Point: Body mechanics  (Done)       Description:   Instruct learner(s) on proper positioning and spine alignment during self-care, functional mobility activities and/or exercises.                  Learning Progress Summary             Patient Acceptance, E,TB, VU by  at 8/26/2023 1150    Comment: POC, Role of OT, transfer training                                         User Key       Initials Effective Dates Name Provider Type Discipline     09/22/22 -  Mireya Kamara, OT Occupational Therapist OT                  OT Recommendation and Plan  Therapy Frequency (OT): other (see comments) (5-7 d/wk)        Time Calculation:         Time Calculation- OT       Row Name 08/28/23 1347 08/28/23 0906          Time Calculation- OT    OT Start Time 1347  -KD 0906  -KD     OT Stop Time 1410  -KD 0950  -KD     OT Time Calculation (min) 23 min  -KD 44 min  -KD     Total Timed Code Minutes- OT 23 minute(s)  -KD 44 minute(s)  -KD     OT Received On 08/28/23  -KD 08/28/23  -KD        Timed Charges    95509 - OT Therapeutic Exercise Minutes 23  -KD --     55205 - OT Self Care/Mgmt Minutes -- 44  -KD        Total Minutes    Timed Charges Total Minutes 23  -KD 44  -KD      Total Minutes 23  -KD 44  -KD               User Key  (r) = Recorded By, (t) = Taken By, (c) = Cosigned By      Initials Name Provider Type    Stephanie Carl COTA Occupational Therapist Assistant                  Therapy Charges for Today       Code Description Service Date Service Provider Modifiers Qty    45745055336 HC OT SELF CARE/MGMT/TRAIN EA 15 MIN 8/28/2023 Stephanie Mcpherson COTA GO 3    92242880643 HC OT THER PROC EA 15 MIN 8/28/2023 Stephanie Mcpherson COTA GO 2                 SHERRI Mckenzie  8/28/2023

## 2023-08-29 ENCOUNTER — APPOINTMENT (OUTPATIENT)
Dept: GENERAL RADIOLOGY | Facility: HOSPITAL | Age: 83
End: 2023-08-29
Payer: MEDICARE

## 2023-08-29 ENCOUNTER — HOSPITAL ENCOUNTER (EMERGENCY)
Facility: HOSPITAL | Age: 83
Discharge: NURSING, REHAB OR LONG-TERM CARE | End: 2023-08-30
Attending: EMERGENCY MEDICINE | Admitting: EMERGENCY MEDICINE
Payer: MEDICARE

## 2023-08-29 VITALS
HEIGHT: 62 IN | DIASTOLIC BLOOD PRESSURE: 94 MMHG | HEART RATE: 92 BPM | WEIGHT: 176 LBS | RESPIRATION RATE: 18 BRPM | SYSTOLIC BLOOD PRESSURE: 145 MMHG | TEMPERATURE: 97.6 F | BODY MASS INDEX: 32.39 KG/M2 | OXYGEN SATURATION: 95 %

## 2023-08-29 DIAGNOSIS — I48.20 CHRONIC ATRIAL FIBRILLATION WITH RAPID VENTRICULAR RESPONSE: Primary | ICD-10-CM

## 2023-08-29 LAB
ALBUMIN SERPL-MCNC: 3.1 G/DL (ref 3.5–5.2)
ALBUMIN/GLOB SERPL: 0.7 G/DL
ALP SERPL-CCNC: 152 U/L (ref 39–117)
ALT SERPL W P-5'-P-CCNC: 23 U/L (ref 1–33)
ANION GAP SERPL CALCULATED.3IONS-SCNC: 15 MMOL/L (ref 5–15)
AST SERPL-CCNC: 30 U/L (ref 1–32)
BACTERIA UR QL AUTO: ABNORMAL /HPF
BASOPHILS # BLD AUTO: 0.07 10*3/MM3 (ref 0–0.2)
BASOPHILS NFR BLD AUTO: 0.7 % (ref 0–1.5)
BILIRUB SERPL-MCNC: <0.2 MG/DL (ref 0–1.2)
BILIRUB UR QL STRIP: NEGATIVE
BUN SERPL-MCNC: 28 MG/DL (ref 8–23)
BUN/CREAT SERPL: 17.5 (ref 7–25)
CALCIUM SPEC-SCNC: 8.9 MG/DL (ref 8.6–10.5)
CARBAMAZEPINE SERPL-MCNC: 5 MCG/ML (ref 4–12)
CHLORIDE SERPL-SCNC: 100 MMOL/L (ref 98–107)
CLARITY UR: CLEAR
CO2 SERPL-SCNC: 24 MMOL/L (ref 22–29)
COLOR UR: YELLOW
CREAT SERPL-MCNC: 1.6 MG/DL (ref 0.57–1)
DEPRECATED RDW RBC AUTO: 45.5 FL (ref 37–54)
EGFRCR SERPLBLD CKD-EPI 2021: 32.1 ML/MIN/1.73
EOSINOPHIL # BLD AUTO: 0.14 10*3/MM3 (ref 0–0.4)
EOSINOPHIL NFR BLD AUTO: 1.4 % (ref 0.3–6.2)
ERYTHROCYTE [DISTWIDTH] IN BLOOD BY AUTOMATED COUNT: 14 % (ref 12.3–15.4)
FLUAV RNA RESP QL NAA+PROBE: NOT DETECTED
FLUBV RNA RESP QL NAA+PROBE: NOT DETECTED
GLOBULIN UR ELPH-MCNC: 4.6 GM/DL
GLUCOSE SERPL-MCNC: 143 MG/DL (ref 65–99)
GLUCOSE UR STRIP-MCNC: NEGATIVE MG/DL
HCT VFR BLD AUTO: 35 % (ref 34–46.6)
HGB BLD-MCNC: 11.5 G/DL (ref 12–15.9)
HGB UR QL STRIP.AUTO: NEGATIVE
HOLD SPECIMEN: NORMAL
HYALINE CASTS UR QL AUTO: ABNORMAL /LPF
IMM GRANULOCYTES # BLD AUTO: 0.04 10*3/MM3 (ref 0–0.05)
IMM GRANULOCYTES NFR BLD AUTO: 0.4 % (ref 0–0.5)
KETONES UR QL STRIP: ABNORMAL
LEUKOCYTE ESTERASE UR QL STRIP.AUTO: ABNORMAL
LYMPHOCYTES # BLD AUTO: 1.57 10*3/MM3 (ref 0.7–3.1)
LYMPHOCYTES NFR BLD AUTO: 16 % (ref 19.6–45.3)
MAGNESIUM SERPL-MCNC: 2.5 MG/DL (ref 1.6–2.4)
MCH RBC QN AUTO: 29.6 PG (ref 26.6–33)
MCHC RBC AUTO-ENTMCNC: 32.9 G/DL (ref 31.5–35.7)
MCV RBC AUTO: 90 FL (ref 79–97)
MONOCYTES # BLD AUTO: 0.66 10*3/MM3 (ref 0.1–0.9)
MONOCYTES NFR BLD AUTO: 6.7 % (ref 5–12)
NEUTROPHILS NFR BLD AUTO: 7.36 10*3/MM3 (ref 1.7–7)
NEUTROPHILS NFR BLD AUTO: 74.8 % (ref 42.7–76)
NITRITE UR QL STRIP: NEGATIVE
NRBC BLD AUTO-RTO: 0 /100 WBC (ref 0–0.2)
NT-PROBNP SERPL-MCNC: 6575 PG/ML (ref 0–1800)
PH UR STRIP.AUTO: 5.5 [PH] (ref 5–9)
PLATELET # BLD AUTO: 255 10*3/MM3 (ref 140–450)
PMV BLD AUTO: 9.3 FL (ref 6–12)
POTASSIUM SERPL-SCNC: 3.8 MMOL/L (ref 3.5–5.2)
PROT SERPL-MCNC: 7.7 G/DL (ref 6–8.5)
PROT UR QL STRIP: ABNORMAL
QT INTERVAL: 310 MS
QTC INTERVAL: 393 MS
RBC # BLD AUTO: 3.89 10*6/MM3 (ref 3.77–5.28)
RBC # UR STRIP: ABNORMAL /HPF
REF LAB TEST METHOD: ABNORMAL
SARS-COV-2 RNA RESP QL NAA+PROBE: NOT DETECTED
SODIUM SERPL-SCNC: 139 MMOL/L (ref 136–145)
SP GR UR STRIP: 1.02 (ref 1–1.03)
SQUAMOUS #/AREA URNS HPF: ABNORMAL /HPF
TROPONIN T SERPL HS-MCNC: 26 NG/L
UROBILINOGEN UR QL STRIP: ABNORMAL
WBC # UR STRIP: ABNORMAL /HPF
WBC NRBC COR # BLD: 9.84 10*3/MM3 (ref 3.4–10.8)

## 2023-08-29 PROCEDURE — 99284 EMERGENCY DEPT VISIT MOD MDM: CPT

## 2023-08-29 PROCEDURE — 96374 THER/PROPH/DIAG INJ IV PUSH: CPT

## 2023-08-29 PROCEDURE — 83880 ASSAY OF NATRIURETIC PEPTIDE: CPT | Performed by: EMERGENCY MEDICINE

## 2023-08-29 PROCEDURE — 71045 X-RAY EXAM CHEST 1 VIEW: CPT

## 2023-08-29 PROCEDURE — 80053 COMPREHEN METABOLIC PANEL: CPT | Performed by: EMERGENCY MEDICINE

## 2023-08-29 PROCEDURE — 36415 COLL VENOUS BLD VENIPUNCTURE: CPT

## 2023-08-29 PROCEDURE — 93005 ELECTROCARDIOGRAM TRACING: CPT | Performed by: EMERGENCY MEDICINE

## 2023-08-29 PROCEDURE — 80156 ASSAY CARBAMAZEPINE TOTAL: CPT | Performed by: EMERGENCY MEDICINE

## 2023-08-29 PROCEDURE — 84484 ASSAY OF TROPONIN QUANT: CPT | Performed by: EMERGENCY MEDICINE

## 2023-08-29 PROCEDURE — 81001 URINALYSIS AUTO W/SCOPE: CPT | Performed by: EMERGENCY MEDICINE

## 2023-08-29 PROCEDURE — 83735 ASSAY OF MAGNESIUM: CPT | Performed by: EMERGENCY MEDICINE

## 2023-08-29 PROCEDURE — 87636 SARSCOV2 & INF A&B AMP PRB: CPT | Performed by: EMERGENCY MEDICINE

## 2023-08-29 PROCEDURE — 85025 COMPLETE CBC W/AUTO DIFF WBC: CPT | Performed by: EMERGENCY MEDICINE

## 2023-08-29 RX ORDER — DILTIAZEM HYDROCHLORIDE 5 MG/ML
10 INJECTION INTRAVENOUS ONCE
Status: COMPLETED | OUTPATIENT
Start: 2023-08-29 | End: 2023-08-29

## 2023-08-29 RX ORDER — DILTIAZEM HCL 90 MG
90 TABLET ORAL EVERY 8 HOURS SCHEDULED
Qty: 90 TABLET | Refills: 0 | Status: SHIPPED | OUTPATIENT
Start: 2023-08-29 | End: 2023-09-28

## 2023-08-29 RX ADMIN — DILTIAZEM HYDROCHLORIDE 10 MG: 5 INJECTION INTRAVENOUS at 20:02

## 2023-08-29 RX ADMIN — DILTIAZEM HYDROCHLORIDE 30 MG: 30 TABLET, FILM COATED ORAL at 18:16

## 2023-08-29 NOTE — ED PROVIDER NOTES
"Subjective   History of Present Illness  This is an 82-year-old female who presents for evaluation \"of my heart.\"  The patient cannot be more specific.  She comes from a skilled nursing facility.  The patient denies chest pain.  She denies shortness of breath.  She denies syncope.  She has no specific complaints.      Review of Systems   All other systems reviewed and are negative.    Past Medical History:   Diagnosis Date    Cancer 2009    leukemia    Coronary artery disease     Diabetes mellitus     Hypertension     Injury of back     Myocardial infarction     Stroke        Allergies   Allergen Reactions    Sulfa Antibiotics Itching and Rash     Patient states she had the Worse yeast infection ever.  Reaction: rash  Patient states she had the Worse yeast infection ever.      Atorvastatin      Memory    Levofloxacin     Meclizine     Morphine        Past Surgical History:   Procedure Laterality Date    CARDIAC CATHETERIZATION Left 9/18/2022    Procedure: Left Heart Cath;  Surgeon: Ayaz Wild DO;  Location: [facility] CATH INVASIVE LOCATION;  Service: Cardiology;  Laterality: Left;    CARDIAC CATHETERIZATION N/A 9/19/2022    Procedure: PERCUTANEOUS CORONARY INTERVENTION;  Surgeon: Ayaz Wild DO;  Location: [facility] CATH INVASIVE LOCATION;  Service: Cardiology;  Laterality: N/A;    CATARACT EXTRACTION      TRIGEMINAL NERVE DECOMPRESSION      TUBAL ABDOMINAL LIGATION      UVULOPALATOPHARYNGOPLASTY         History reviewed. No pertinent family history.    Social History     Socioeconomic History    Marital status:    Tobacco Use    Smoking status: Never    Smokeless tobacco: Never   Vaping Use    Vaping Use: Never used   Substance and Sexual Activity    Alcohol use: No    Drug use: No    Sexual activity: Never           Objective   Physical Exam  Vitals and nursing note reviewed.   Constitutional:       Appearance: She is not ill-appearing.   HENT:      Head: Normocephalic and atraumatic.      Nose: Nose " normal.   Eyes:      General: No scleral icterus.  Cardiovascular:      Rate and Rhythm: Normal rate. Rhythm irregular.      Comments: Heart rate ranging from  at bedside.  Pulmonary:      Effort: Pulmonary effort is normal.      Breath sounds: Normal breath sounds.   Abdominal:      Tenderness: There is no abdominal tenderness.   Musculoskeletal:         General: No signs of injury.   Skin:     General: Skin is warm and dry.   Neurological:      Mental Status: She is alert.      Comments: Pleasantly confused and cooperative   Psychiatric:         Behavior: Behavior normal.       ECG 12 Lead Dyspnea      Date/Time: 8/29/2023 3:36 PM  Performed by: Cooper Kamara DO  Authorized by: Popeye Mata DO   Interpreted by physician  Comments: EKG August 29, 2023 at 1536 reveals atrial fibrillation rate 97 bpm.  Nonspecific ST changes.  QTc 393.  Unchanged in comparison to August 25, 2023 EKG.             ED Course      Labs Reviewed   COMPREHENSIVE METABOLIC PANEL - Abnormal; Notable for the following components:       Result Value    Glucose 143 (*)     BUN 28 (*)     Creatinine 1.60 (*)     Albumin 3.1 (*)     Alkaline Phosphatase 152 (*)     eGFR 32.1 (*)     All other components within normal limits    Narrative:     GFR Normal >60  Chronic Kidney Disease <60  Kidney Failure <15    The GFR formula is only valid for adults with stable renal function between ages 18 and 70.   URINALYSIS W/ MICROSCOPIC IF INDICATED (NO CULTURE) - Abnormal; Notable for the following components:    Ketones, UA Trace (*)     Protein, UA 30 mg/dL (1+) (*)     Leuk Esterase, UA Moderate (2+) (*)     All other components within normal limits   TROPONIN - Abnormal; Notable for the following components:    HS Troponin T 26 (*)     All other components within normal limits    Narrative:     High Sensitive Troponin T Reference Range:  <10.0 ng/L- Negative Female for AMI  <15.0 ng/L- Negative Male for AMI  >=10 - Abnormal Female  indicating possible myocardial injury.  >=15 - Abnormal Male indicating possible myocardial injury.   Clinicians would have to utilize clinical acumen, EKG, Troponin, and serial changes to determine if it is an Acute Myocardial Infarction or myocardial injury due to an underlying chronic condition.        BNP (IN-HOUSE) - Abnormal; Notable for the following components:    proBNP 6,575.0 (*)     All other components within normal limits    Narrative:     Among patients with dyspnea, NT-proBNP is highly sensitive for the detection of acute congestive heart failure. In addition NT-proBNP of <300 pg/ml effectively rules out acute congestive heart failure with 99% negative predictive value.     MAGNESIUM - Abnormal; Notable for the following components:    Magnesium 2.5 (*)     All other components within normal limits   CBC WITH AUTO DIFFERENTIAL - Abnormal; Notable for the following components:    Hemoglobin 11.5 (*)     Lymphocyte % 16.0 (*)     Neutrophils, Absolute 7.36 (*)     All other components within normal limits   URINALYSIS, MICROSCOPIC ONLY - Abnormal; Notable for the following components:    WBC, UA 6-12 (*)     Bacteria, UA Trace (*)     Squamous Epithelial Cells, UA 3-5 (*)     All other components within normal limits   COVID-19 AND FLU A/B, NP SWAB IN TRANSPORT MEDIA 8-12 HR TAT - Normal    Narrative:     Fact sheet for providers: https://www.fda.gov/media/257479/download    Fact sheet for patients: https://www.fda.gov/media/571123/download    Test performed by PCR.   CARBAMAZEPINE LEVEL, TOTAL - Normal   CBC AND DIFFERENTIAL    Narrative:     The following orders were created for panel order CBC & Differential.  Procedure                               Abnormality         Status                     ---------                               -----------         ------                     CBC Auto Differential[[id number]]        Abnormal            Final result                 Please view results for these  tests on the individual orders.   EXTRA TUBES    Narrative:     The following orders were created for panel order Extra Tubes.  Procedure                               Abnormality         Status                     ---------                               -----------         ------                     Gold Top - SST[[id number]]                                   Final result                 Please view results for these tests on the individual orders.   [facility]     Adult Transthoracic Echo Complete W/ Cont if Necessary Per Protocol    Result Date: 8/11/2023  Narrative:   Left ventricular systolic function is moderately decreased. Left ventricular ejection fraction appears to be 36 - 40%.   Left ventricular wall thickness is consistent with mild concentric hypertrophy.   The following left ventricular wall segments are hypokinetic: mid anterior, apical anterior, apical lateral and mid inferolateral.   Left ventricular diastolic function was indeterminate.   The left atrial cavity is mildly dilated.   Estimated right ventricular systolic pressure from tricuspid regurgitation is normal (<35 mmHg).     CT Abdomen Pelvis Without Contrast    Result Date: 8/10/2023  Narrative: EXAM: CT abdomen and pelvis without contrast. COMPARISON: CT abdomen and pelvis without contrast, January 9, 2023 HISTORY: Flank pain, unspecified side.  Kidney stone suspected. TECHNIQUE: Axial images from the level of the diaphragms through the pelvis were performed followed by 2D multiplanar reformats. FINDINGS: Small bilateral pleural effusions with mild bibasilar atelectasis.  Heart size is grossly within normal limits. Liver, spleen, pancreas, adrenal glands and left kidney are unremarkable for a noncontrast exam.  Redemonstration of right-sided hydronephrosis which has the appearance of UPJ obstruction.  This is grossly stable.  However, this could also be due to a large renal cyst or parapelvic cysts. No lymphadenopathy identified in the  abdomen or pelvis by size criteria. Bladder is unremarkable.  Uterus is small and atrophic which is not unexpected for age.  There is colonic diverticulitis without evidence of acute diverticulitis.  The appendix is not definitely seen.  No free fluid. No acute osseous abnormality.     Impression: 1.  There is redemonstration of a large cystic structure in the region of the right renal pelvis.  It is uncertain if this represents a large renal cyst or peripelvic renal cysts or if this may represent a dilated renal pelvis in the setting of UPJ obstruction.  There does appear to be a component of hydronephrosis on today's exam with dilatation of renal calyces.  This was not seen on the prior exam.  As a result, this represents UPJ obstruction, this is presumably worsening.  Alternatively, this could represent a peripelvic cyst with interval development of right-sided hydronephrosis due to ureteral obstruction, stricture or lesion.  There is no discrete ureteral stone identified. 2.  Small bilateral pleural effusions. Additional findings as described above.    US Guided Vascular Access    Result Date: 8/11/2023  Narrative: Targeted grayscale ultrasound with compression of the right basilic vein demonstrates patency.    XR Chest 1 View    Result Date: 8/29/2023  Narrative: Comparison: 8/25/2023 FINDINGS: No focal consolidation, pleural effusion, or pneumothorax.  Mild left diaphragmatic eventration.  Cardiomediastinal silhouette is unremarkable. Degenerative changes at bilateral shoulders.     XR Chest 1 View    Result Date: 8/25/2023  Narrative: Portable upright AP view of the chest HISTORY: Shortness of breath. COMPARISON: 01/09/2023 FINDINGS: Diffuse interstitial prominence suggesting pulmonary edema. Blunting of the costophrenic angles could reflect trace effusions.  No pneumothorax is seen. The heart is mildly prominent but stable.  There does appear to be some central vascular congestion. Moderate arthritis at the  "AC joints and right shoulder.     IR Insert Midline Without Port Pump 5 Plus    Result Date: 8/11/2023  Narrative: This procedure was auto-finalized with no dictation required.        Medical Decision Making  The patient's recent past medical charts for this facility as well as outside facilities via the \"care everywhere\" application of epic reviewed.  The patient was discharged from the hospital yesterday after her second admission of the month for complications of A-fib and heart failure.    The differential diagnosis includes congestive heart failure, atrial fibrillation, acute coronary syndrome, and anemia, among others.    Case signed out to Dr. Kamara pending labs, reevaluation, and final disposition.  I suspect that the patient's Cardizem could be titrated up to 90 mg 3 times daily for better rate control.      Problems Addressed:  Chronic atrial fibrillation with rapid ventricular response: complicated acute illness or injury    Amount and/or Complexity of Data Reviewed  Independent Historian: EMS     Details: Additional history provided by EMS personnel.  Labs: ordered. Decision-making details documented in ED Course.  Radiology: ordered.  ECG/medicine tests: ordered and independent interpretation performed.     Details: EKG interpretation: Interpreted myself.  Atrial fibrillation.  Rate 97.  Interventricular conduction delay.  Nonspecific ST wave changes.  Normal QTc interval.    Risk  OTC drugs.  Prescription drug management.      1830 Patient was signed out to me by Dr. Mata at the end of his shift.  Patient awaiting results of laboratory studies.  EKG unchanged.  Medication has been given for A-fib.  A-fib is chronic and we are trying to better rate control her.    Patient rate controlled after oral and IV Cardizem.  Work-up reveals no signs of cardiac dysfunction or other cause of increased rate.  With Dr. Mata's and patient's Cardizem will be increased to 90 mg 3 times daily with close " outpatient follow-up with cardiology.  No indication for admission at this time.  Advised patient of findings and plan and she is agreeable to discharge with outpatient management at this time.    Final diagnoses:   Chronic atrial fibrillation with rapid ventricular response        ED Disposition  ED Disposition       ED Disposition   Discharge    Condition   Stable    Comment   --               Sean Briones MD  [address] [place] [id number] [phone]               Medication List        Changed      dilTIAZem 90 MG tablet  Commonly known as: CARDIZEM  Take 1 tablet by mouth Every 8 (Eight) Hours for 30 days.  What changed:   medication strength  how much to take     rosuvastatin 20 MG tablet  Commonly known as: CRESTOR  Take 1 tablet by mouth Every Night.  What changed: how much to take               Where to Get Your Medications        These medications were [place] [facility] [place] - [address] [place] [address] [phone] [facility] [phone] [facility] [address] [address]      Phone: [phone]   dilTIAZem 90 MG tablet            Cooper Kamara, DO  08/29/23 9030

## 2023-08-29 NOTE — ED NOTES
Pt arrives via EMS from [place] with c/o hypotension. Upon arrival pt c/o weakness and fatigue but doesn't know why she was sent to ED.

## 2023-08-30 NOTE — DISCHARGE INSTRUCTIONS
Please return with new or worsening symptoms.  Increased Cardizem prescription has been sent to the pharmacy.  Follow-up with cardiology.

## 2023-09-13 LAB
QT INTERVAL: 310 MS
QTC INTERVAL: 393 MS

## 2023-10-09 PROBLEM — I63.9 CEREBROVASCULAR ACCIDENT (CVA): Status: ACTIVE | Noted: 2023-10-09

## 2023-10-09 PROBLEM — R26.9 GAIT DISORDER: Status: ACTIVE | Noted: 2023-10-09

## 2023-10-09 PROBLEM — G50.0 TRIGEMINAL NEURALGIA: Status: ACTIVE | Noted: 2023-10-09

## 2023-10-09 PROBLEM — R53.81 DEBILITY: Status: ACTIVE | Noted: 2023-10-09

## 2023-10-09 PROBLEM — E78.2 MIXED HYPERLIPIDEMIA: Status: ACTIVE | Noted: 2023-10-09

## 2023-10-09 PROBLEM — E11.9 DM (DIABETES MELLITUS): Status: ACTIVE | Noted: 2023-10-09

## 2023-10-09 NOTE — PROGRESS NOTES
Nursing Home History and Physical Note       PATIENT NAME: Magy Barton                                                                          YOB: 1940            DATE OF SERVICE: 08/24/2023  FACILITY: [][facility]    [] [facility]   [x] [facility]    [] [facility]    [] [facility]   [] Other   ______________________________________________________________________     CHIEF COMPLAINT:  1st visit      HISTORY OF PRESENT ILLNESS:   83 yof,  rev/u[dated 4/23-4/29/21 dc sum (copied)    PAST MEDICAL & SURGICAL HISTORY:   Past Medical History:   Diagnosis Date    Cancer 2009    leukemia    Coronary artery disease     Diabetes mellitus     Hypertension     Injury of back     Myocardial infarction     Stroke       Past Surgical History:   Procedure Laterality Date    CARDIAC CATHETERIZATION Left 9/18/2022    Procedure: Left Heart Cath;  Surgeon: Ayaz Wild DO;  Location: [facility] CATH INVASIVE LOCATION;  Service: Cardiology;  Laterality: Left;    CARDIAC CATHETERIZATION N/A 9/19/2022    Procedure: PERCUTANEOUS CORONARY INTERVENTION;  Surgeon: Ayaz Wild DO;  Location: [facility] CATH INVASIVE LOCATION;  Service: Cardiology;  Laterality: N/A;    CATARACT EXTRACTION      TRIGEMINAL NERVE DECOMPRESSION      TUBAL ABDOMINAL LIGATION      UVULOPALATOPHARYNGOPLASTY           MEDICATIONS:  I have reviewed and reconciled the patients medication list in the patients chart at the skilled nursing facility today.      ALLERGIES:  Allergies   Allergen Reactions    Sulfa Antibiotics Itching and Rash     Patient states she had the Worse yeast infection ever.  Reaction: rash  Patient states she had the Worse yeast infection ever.      Atorvastatin      Memory    Levofloxacin     Meclizine     Morphine          SOCIAL HISTORY:  Social History     Socioeconomic History    Marital status:    Tobacco Use    Smoking status: Never    Smokeless tobacco: Never    Vaping Use    Vaping Use: Never used   Substance and Sexual Activity    Alcohol use: No    Drug use: No    Sexual activity: Never       FAMILY HISTORY:  No family history on file.    REVIEW OF SYSTEMS:  Review of Systems      PHYSICAL EXAMINATION:   VITAL SIGNS: wt 186.2  t 98.2  p 95  bp 148/86 r 16 o2 97%    Physical Exam  somnolant skin intact ent btmw  neck supple bs dim  heart reg abd nontender ext gen mus wasting    RECORDS REVIEW:   I have reviewed and interpreted the following lab test results  obtained at the time of the visit today.     ASSESSMENT    Diagnoses and all orders for this visit:    1. Longstanding persistent atrial fibrillation (Primary)    2. Chronic systolic heart failure    3. Cerebrovascular accident (CVA), unspecified mechanism    4. Debility    5. Trigeminal neuralgia    6. Gait disorder    7. Mixed hyperlipidemia        PLAN  Orders rev / signed  cont supp care  rech 30 d  -  Discussed Patient in detail with nursing/staff, addressed all needs today.     -  Plan of Care Reviewed   -  PT/OT Reviewed   -  Order Changes  -  Discharge Plans Reviewed  -  Advance Directive on file with Nursing Home.   -  POA on file with Nursing Home.   -  Code Status listed: -  Full Code   -  DNR          Total face to face time spent with patient 15 minutes. Of which   0  where in counseling the patient and family.     Andrea Gray MD.

## (undated) DEVICE — GW PERIPH GUIDERIGHT STD/EXCHNG/J/TIP SS 0.038IN 5X260CM

## (undated) DEVICE — RUNTHROUGH NS EXTRA FLOPPY PTCA GUIDEWIRE: Brand: RUNTHROUGH

## (undated) DEVICE — ANGIOGRAPHIC CATHETER: Brand: EXPO™

## (undated) DEVICE — DEV INFL MONARCH 20ML

## (undated) DEVICE — 6F .070 XBC 3.5: Brand: VISTA BRITE TIP

## (undated) DEVICE — GUIDELINER CATHETERS ARE INTENDED TO BE USED IN CONJUNCTION WITH GUIDE CATHETERS TO ACCESS DISCRETE REGIONS OF THE CORONARY AND/OR PERIPHERAL VASCULATURE, AND TO FACILITATE PLACEMENT OF INTERVENTIONAL DEVICES.: Brand: GUIDELINER® V3 CATHETER

## (undated) DEVICE — CATH IMG IVUS EAGLE EYE RAPDXNG PLAT SHT/TP 5F .014IN

## (undated) DEVICE — HI-TORQUE VERSACORE FLOPPY GUIDE WIRE SYSTEM 260 CM: Brand: HI-TORQUE VERSACORE

## (undated) DEVICE — CATH GUIDE LAUNCHER JR4.0 6F 100CM

## (undated) DEVICE — GLIDESHEATH SLENDER STAINLESS STEEL KIT: Brand: GLIDESHEATH SLENDER

## (undated) DEVICE — MODEL AT P65, P/N 701554-001KIT CONTENTS: HAND CONTROLLER, 3-WAY HIGH-PRESSURE STOPCOCK WITH ROTATING END AND PREMIUM HIGH-PRESSURE TUBING: Brand: ANGIOTOUCH® KIT

## (undated) DEVICE — PK CATH LAB 60

## (undated) DEVICE — COPILOT KIT INCLUDES BLEEDBACK CONTROL VALVE / GUIDE WIRE INTRODUCER / TORQUE DEVICE: Brand: ACCESSORIES

## (undated) DEVICE — NC TREK CORONARY DILATATION CATHETER 2.5 MM X 12 MM / RAPID-EXCHANGE: Brand: NC TREK

## (undated) DEVICE — ST CVR PROB PULLUP ULTRASND 5X48IN

## (undated) DEVICE — MINI TREK CORONARY DILATATION CATHETER 2.0 MM X 12 MM / RAPID-EXCHANGE: Brand: MINI TREK

## (undated) DEVICE — CATH DIAG EXPO .045 FL3.5 5F 100CM

## (undated) DEVICE — MODEL BT2000 P/N 700287-012KIT CONTENTS: MANIFOLD WITH SALINE AND CONTRAST PORTS, SALINE TUBING WITH SPIKE AND HAND SYRINGE, TRANSDUCER: Brand: BT2000 AUTOMATED MANIFOLD KIT

## (undated) DEVICE — TR BAND RADIAL ARTERY COMPRESSION DEVICE: Brand: TR BAND

## (undated) DEVICE — ELECTRODE,RT,STRESS,FOAM,50PK: Brand: MEDLINE

## (undated) DEVICE — TREK CORONARY DILATATION CATHETER 3.0 MM X 12 MM / RAPID-EXCHANGE: Brand: TREK

## (undated) DEVICE — GW PRESSUREWIRE X WIRELESS FFR 175CM

## (undated) DEVICE — A2000 MULTI-USE SYRINGE KIT, P/N 701277-003KIT CONTENTS: 100ML CONTRAST RESERVOIR AND TUBING WITH CONTRAST SPIKE AND CLAMP: Brand: A2000 MULTI-USE SYRINGE KIT

## (undated) DEVICE — CATH DIAG EXPO .045 FL4 5F 100CM